# Patient Record
Sex: FEMALE | Race: WHITE | Employment: OTHER | URBAN - METROPOLITAN AREA
[De-identification: names, ages, dates, MRNs, and addresses within clinical notes are randomized per-mention and may not be internally consistent; named-entity substitution may affect disease eponyms.]

---

## 2017-01-05 ENCOUNTER — TELEPHONE (OUTPATIENT)
Dept: FAMILY MEDICINE | Facility: CLINIC | Age: 72
End: 2017-01-05

## 2017-01-05 NOTE — TELEPHONE ENCOUNTER
Panel Management Review      Patient has the following on her problem list:     Diabetes    ASA: Not Required     Last A1C  A1C      8.8   11/16/2016  A1C      7.3   5/17/2016  A1C      7.0   10/2/2015  A1C      7.1   1/15/2015  A1C      6.8   4/25/2014  A1C tested: Failed    Last LDL:    CHOL      198   11/16/2016  HDL       45   11/16/2016  LDL      129   11/16/2016  TRIG      122   11/16/2016  CHOLHDLRATIO      4.9   6/2/2014  NHDL      153   11/16/2016    Is the patient on a Statin? YES             Is the patient on Aspirin? NO    Medications     HMG CoA Reductase Inhibitors    atorvastatin (LIPITOR) 40 MG tablet          Last three blood pressure readings:  BP Readings from Last 3 Encounters:   11/22/16 140/80   09/08/16 142/84   05/17/16 130/80       Date of last diabetes office visit:      Tobacco History:     History   Smoking status     Never Smoker    Smokeless tobacco     Never Used           Hypertension   Last three blood pressure readings:  BP Readings from Last 3 Encounters:   11/22/16 140/80   09/08/16 142/84   05/17/16 130/80     Blood pressure: Failed    HTN Guidelines:  Age 18-59 BP range:  Less than 140/90  Age 60-85 with Diabetes:  Less than 140/90  Age 60-85 without Diabetes:  less than 150/90      Composite cancer screening  Chart review shows that this patient is due/due soon for the following None  Summary:    Patient is due/failing the following:   A1C/ b/p    Action needed:   None/ she will f/u in Feb 2017    Type of outreach:    none    Questions for provider review:    None                                                                                                                                    Farida Morley CMA     Chart routed to none .

## 2017-01-10 ENCOUNTER — TELEPHONE (OUTPATIENT)
Dept: FAMILY MEDICINE | Facility: CLINIC | Age: 72
End: 2017-01-10

## 2017-01-10 NOTE — TELEPHONE ENCOUNTER
Panel Management Review      Patient has the following on her problem list:     Diabetes    ASA: Not Required     Last A1C  A1C      8.8   11/16/2016  A1C      7.3   5/17/2016  A1C      7.0   10/2/2015  A1C      7.1   1/15/2015  A1C      6.8   4/25/2014  A1C tested: Failed    Last LDL:    CHOL      198   11/16/2016  HDL       45   11/16/2016  LDL      129   11/16/2016  TRIG      122   11/16/2016  CHOLHDLRATIO      4.9   6/2/2014  NHDL      153   11/16/2016    Is the patient on a Statin? YES             Is the patient on Aspirin? NO    Medications     HMG CoA Reductase Inhibitors    atorvastatin (LIPITOR) 40 MG tablet          Last three blood pressure readings:  BP Readings from Last 3 Encounters:   11/22/16 140/80   09/08/16 142/84   05/17/16 130/80       Date of last diabetes office visit: 11.2016     Tobacco History:     History   Smoking status     Never Smoker    Smokeless tobacco     Never Used             Composite cancer screening  Chart review shows that this patient is due/due soon for the following None  Summary:    Patient is due/failing the following:   A1C    Action needed:   None/ follow up in feb 2017    Type of outreach:    none    Questions for provider review:    None                                                                                                                                    Farida Morley CMA     Chart routed to none .

## 2017-02-03 DIAGNOSIS — I50.40 CHF (CONGESTIVE HEART FAILURE), NYHA CLASS I, UNSPECIFIED FAILURE CHRONICITY, COMBINED (H): ICD-10-CM

## 2017-02-03 DIAGNOSIS — I10 ESSENTIAL HYPERTENSION WITH GOAL BLOOD PRESSURE LESS THAN 140/90: Primary | ICD-10-CM

## 2017-02-03 RX ORDER — FUROSEMIDE 20 MG
40 TABLET ORAL DAILY
Qty: 180 TABLET | Refills: 0 | Status: SHIPPED | OUTPATIENT
Start: 2017-02-03 | End: 2017-06-05

## 2017-02-03 NOTE — TELEPHONE ENCOUNTER
Furosemide 20mg      Last Written Prescription Date: 11/22/16  Last Fill Quantity: 180, # refills: 1  Last Office Visit with G, P or [facility] prescribing provider: 11/22/16       POTASSIUM   Date Value Ref Range Status   11/16/2016 4.7 3.4 - 5.3 mmol/L Final     CREATININE   Date Value Ref Range Status   11/16/2016 1.20* 0.52 - 1.04 mg/dL Final     BP Readings from Last 3 Encounters:   11/22/16 140/80   09/08/16 142/84   05/17/16 130/80     Thank You,  Denisha Mark, [facility]

## 2017-02-09 DIAGNOSIS — Z53.9 DIAGNOSIS NOT YET DEFINED: Primary | ICD-10-CM

## 2017-03-10 DIAGNOSIS — Z53.9 DIAGNOSIS NOT YET DEFINED: Primary | ICD-10-CM

## 2017-03-10 PROCEDURE — G0180 MD CERTIFICATION HHA PATIENT: HCPCS | Performed by: FAMILY MEDICINE

## 2017-03-16 DIAGNOSIS — F41.1 GAD (GENERALIZED ANXIETY DISORDER): ICD-10-CM

## 2017-03-16 RX ORDER — PAROXETINE 20 MG/1
20 TABLET, FILM COATED ORAL AT BEDTIME
Qty: 90 TABLET | Refills: 1 | Status: CANCELLED | OUTPATIENT
Start: 2017-03-16

## 2017-03-16 NOTE — TELEPHONE ENCOUNTER
Paxil 20mg     Last Written Prescription Date: 11/22/16  Last Fill Quantity: 90, # refills: 1  Last Office Visit with [facility] primary care provider:  11/22/16        Last PHQ-9 score on record=   PHQ-9 SCORE 11/22/2016   Total Score -   Total Score 5       Luba Roman CPhT  [facility]    On behalf of [facility]

## 2017-03-17 NOTE — TELEPHONE ENCOUNTER
Last script was sent to [facility] site contact pharmacy to transfer Rx.    Roberto Dueñas PharmD - [facility], on behalf of [facility]

## 2017-03-21 DIAGNOSIS — E11.9 TYPE 2 DIABETES, HBA1C GOAL < 8% (H): ICD-10-CM

## 2017-03-21 DIAGNOSIS — E11.22 TYPE 2 DIABETES MELLITUS WITH DIABETIC CHRONIC KIDNEY DISEASE (H): ICD-10-CM

## 2017-03-21 DIAGNOSIS — E11.22 TYPE 2 DIABETES MELLITUS WITH CHRONIC KIDNEY DISEASE ON CHRONIC DIALYSIS, UNSPECIFIED LONG TERM INSULIN USE STATUS: Primary | ICD-10-CM

## 2017-03-21 DIAGNOSIS — Z99.2 TYPE 2 DIABETES MELLITUS WITH CHRONIC KIDNEY DISEASE ON CHRONIC DIALYSIS, UNSPECIFIED LONG TERM INSULIN USE STATUS: Primary | ICD-10-CM

## 2017-03-21 DIAGNOSIS — N18.6 TYPE 2 DIABETES MELLITUS WITH CHRONIC KIDNEY DISEASE ON CHRONIC DIALYSIS, UNSPECIFIED LONG TERM INSULIN USE STATUS: Primary | ICD-10-CM

## 2017-03-21 DIAGNOSIS — I10 HYPERTENSION GOAL BP (BLOOD PRESSURE) < 140/90: ICD-10-CM

## 2017-03-21 NOTE — TELEPHONE ENCOUNTER
Patient was due back for diabetes OV in Feb.  No appointment pending at this time.  Routing to provider to advise.    Shelby Torres RN

## 2017-03-21 NOTE — LETTER
[facility]                                           [address] [place]  [address]    March 23, 2017    Catherine Ferreira  [address] [facility] [address]    Dear Catherine,       We recently received a refill request for blood glucose monitoring (NO BRAND SPECIFIED) test strip.  We have refilled this for a one time 30 day supply only because you are due for a:    Diabetes office visit and fasting lab appointment-Dr Dotson would like to see you in the next month please      Please schedule this lab appointment 4-5 days prior to the office visit.     Please call at your earliest convenience so that there will not be a delay with your future refills.          Thank you,   Your [facility] Care Team/  [phone]

## 2017-03-23 NOTE — TELEPHONE ENCOUNTER
Please review lab orders sign and close encounter. Tamia Butt MA/RAYSHAWN    Mailed letter that lab and provider appt are due

## 2017-04-04 DIAGNOSIS — Z53.9 DIAGNOSIS NOT YET DEFINED: Primary | ICD-10-CM

## 2017-04-11 ENCOUNTER — MEDICAL CORRESPONDENCE (OUTPATIENT)
Dept: HEALTH INFORMATION MANAGEMENT | Facility: CLINIC | Age: 72
End: 2017-04-11

## 2017-04-14 DIAGNOSIS — Z53.9 DIAGNOSIS NOT YET DEFINED: Primary | ICD-10-CM

## 2017-04-28 ENCOUNTER — TELEPHONE (OUTPATIENT)
Dept: FAMILY MEDICINE | Facility: CLINIC | Age: 72
End: 2017-04-28

## 2017-04-28 DIAGNOSIS — E11.22 TYPE 2 DIABETES MELLITUS WITH STAGE 1 CHRONIC KIDNEY DISEASE, WITHOUT LONG-TERM CURRENT USE OF INSULIN (H): ICD-10-CM

## 2017-04-28 DIAGNOSIS — N18.1 TYPE 2 DIABETES MELLITUS WITH STAGE 1 CHRONIC KIDNEY DISEASE, WITHOUT LONG-TERM CURRENT USE OF INSULIN (H): ICD-10-CM

## 2017-04-28 DIAGNOSIS — J45.40 MODERATE PERSISTENT ASTHMA: ICD-10-CM

## 2017-04-28 RX ORDER — METFORMIN HCL 500 MG
TABLET, EXTENDED RELEASE 24 HR ORAL
Qty: 180 TABLET | Refills: 0 | Status: CANCELLED | OUTPATIENT
Start: 2017-04-28

## 2017-04-28 RX ORDER — METFORMIN HCL 500 MG
500 TABLET, EXTENDED RELEASE 24 HR ORAL 2 TIMES DAILY WITH MEALS
Qty: 180 TABLET | Refills: 0 | Status: SHIPPED | OUTPATIENT
Start: 2017-04-28 | End: 2017-08-01

## 2017-04-28 RX ORDER — ALBUTEROL SULFATE 90 UG/1
AEROSOL, METERED RESPIRATORY (INHALATION)
Qty: 18 G | Refills: 0 | Status: SHIPPED | OUTPATIENT
Start: 2017-04-28 | End: 2017-06-05

## 2017-04-28 NOTE — TELEPHONE ENCOUNTER
Patient would like refill RX: metFORMIN (GLUCOPHAGE-XR) 500 MG 24 hr tablet, until she can be seen with provider. Thank you.

## 2017-05-23 ENCOUNTER — TELEPHONE (OUTPATIENT)
Dept: FAMILY MEDICINE | Facility: CLINIC | Age: 72
End: 2017-05-23

## 2017-05-23 NOTE — LETTER
[facility]  [address] [facility] [address] [phone]          May 23, 2017    Catherine Ferreira  [address] [facility] [address]              Our records indicate that you have not scheduled for a(n)appointment with David Dotson MD, Diabetic check  and Fasting bloodwork which was recommended by your health care team. Monitoring and managing your preventative and chronic health conditions are very important to us.       If you have received your health care elsewhere, please provide us with that information so it can be documented in your chart.    Please call [phone] or message us through your [employer] account to schedule an appointment or provide information for your chart.     I look forward to seeing you and working with you on your health care needs.     Sincerely,   David Dotson MD          *If you have already scheduled an appointment, please disregard this reminder

## 2017-05-23 NOTE — TELEPHONE ENCOUNTER
Panel Management Review      Patient has the following on her problem list:     Diabetes    ASA: Not Required     Last A1C  Lab Results   Component Value Date    A1C 8.8 11/16/2016    A1C 7.3 05/17/2016    A1C 7.0 10/02/2015    A1C 7.1 01/15/2015    A1C 6.8 04/25/2014     A1C tested: FAILED    Last LDL:    Lab Results   Component Value Date    CHOL 198 11/16/2016     Lab Results   Component Value Date    HDL 45 11/16/2016     Lab Results   Component Value Date     11/16/2016     Lab Results   Component Value Date    TRIG 122 11/16/2016     Lab Results   Component Value Date    CHOLHDLRATIO 4.9 06/02/2014     Lab Results   Component Value Date    NHDL 153 11/16/2016       Is the patient on a Statin? YES             Is the patient on Aspirin? NO    Medications     HMG CoA Reductase Inhibitors    atorvastatin (LIPITOR) 40 MG tablet          Last three blood pressure readings:  BP Readings from Last 3 Encounters:   11/22/16 140/80   09/08/16 142/84   05/17/16 130/80       Date of last diabetes office visit: 11.2016     Tobacco History:     History   Smoking Status     Never Smoker   Smokeless Tobacco     Never Used         Hypertension   Last three blood pressure readings:  BP Readings from Last 3 Encounters:   11/22/16 140/80   09/08/16 142/84   05/17/16 130/80     Blood pressure: FAILED    HTN Guidelines:  Age 18-59 BP range:  Less than 140/90  Age 60-85 with Diabetes:  Less than 140/90  Age 60-85 without Diabetes:  less than 150/90      Composite cancer screening  Chart review shows that this patient is due/due soon for the following None  Summary:    Patient is due/failing the following:   A1C,cbc, bmp,alt,micro albumin     Action needed:   Patient needs office visit for diab check.    Type of outreach:    Sent letter.    Questions for provider review:    Please place fasting lab order                                                                                                                                     Farida Morley, [facility]       Chart routed to Provider .

## 2017-06-02 ENCOUNTER — TELEPHONE (OUTPATIENT)
Dept: FAMILY MEDICINE | Facility: CLINIC | Age: 72
End: 2017-06-02

## 2017-06-02 NOTE — TELEPHONE ENCOUNTER
Pt states she is having fatigue and chest congestion.  Appointment changed to Monday.  Farida Silva RN

## 2017-06-02 NOTE — TELEPHONE ENCOUNTER
Reason for Call:  Other appointment    Detailed comments: pt scheduled an appt for diabetes follow up on June 23rd with Mauri. Pt states isnt feeling well and would like to be seen for follow up sooner than scheduled date. Please advise and contact pt if can be seen sooner than appt on June 23rd.    Phone Number Patient can be reached at: Home number on file [phone] (home)    Best Time: ANY    Can we leave a detailed message on this number? YES    Call taken on 6/2/2017 at 12:57 PM by Genoveva Ramirez

## 2017-06-05 ENCOUNTER — TELEPHONE (OUTPATIENT)
Dept: FAMILY MEDICINE | Facility: CLINIC | Age: 72
End: 2017-06-05

## 2017-06-05 ENCOUNTER — OFFICE VISIT (OUTPATIENT)
Dept: FAMILY MEDICINE | Facility: CLINIC | Age: 72
End: 2017-06-05
Payer: COMMERCIAL

## 2017-06-05 VITALS
OXYGEN SATURATION: 92 % | HEART RATE: 93 BPM | TEMPERATURE: 98.8 F | WEIGHT: 293 LBS | BODY MASS INDEX: 54.59 KG/M2 | DIASTOLIC BLOOD PRESSURE: 82 MMHG | SYSTOLIC BLOOD PRESSURE: 154 MMHG

## 2017-06-05 DIAGNOSIS — F41.1 GAD (GENERALIZED ANXIETY DISORDER): ICD-10-CM

## 2017-06-05 DIAGNOSIS — F33.1 MAJOR DEPRESSIVE DISORDER, RECURRENT EPISODE, MODERATE (H): ICD-10-CM

## 2017-06-05 DIAGNOSIS — I50.40 CHF (CONGESTIVE HEART FAILURE), NYHA CLASS I, UNSPECIFIED FAILURE CHRONICITY, COMBINED (H): ICD-10-CM

## 2017-06-05 DIAGNOSIS — K21.9 GASTROESOPHAGEAL REFLUX DISEASE WITHOUT ESOPHAGITIS: ICD-10-CM

## 2017-06-05 DIAGNOSIS — K21.00 GASTROESOPHAGEAL REFLUX DISEASE WITH ESOPHAGITIS: ICD-10-CM

## 2017-06-05 DIAGNOSIS — M54.31 SCIATICA OF RIGHT SIDE: ICD-10-CM

## 2017-06-05 DIAGNOSIS — E11.22 TYPE 2 DIABETES MELLITUS WITH CHRONIC KIDNEY DISEASE ON CHRONIC DIALYSIS, UNSPECIFIED LONG TERM INSULIN USE STATUS: Primary | ICD-10-CM

## 2017-06-05 DIAGNOSIS — I10 ESSENTIAL HYPERTENSION WITH GOAL BLOOD PRESSURE LESS THAN 140/90: ICD-10-CM

## 2017-06-05 DIAGNOSIS — J31.0 CHRONIC RHINITIS: ICD-10-CM

## 2017-06-05 DIAGNOSIS — Z99.2 TYPE 2 DIABETES MELLITUS WITH CHRONIC KIDNEY DISEASE ON CHRONIC DIALYSIS, UNSPECIFIED LONG TERM INSULIN USE STATUS: Primary | ICD-10-CM

## 2017-06-05 DIAGNOSIS — E78.5 HYPERLIPIDEMIA WITH TARGET LDL LESS THAN 100: ICD-10-CM

## 2017-06-05 DIAGNOSIS — N18.6 TYPE 2 DIABETES MELLITUS WITH CHRONIC KIDNEY DISEASE ON CHRONIC DIALYSIS, UNSPECIFIED LONG TERM INSULIN USE STATUS: Primary | ICD-10-CM

## 2017-06-05 DIAGNOSIS — J45.40 MODERATE PERSISTENT ASTHMA WITHOUT COMPLICATION: ICD-10-CM

## 2017-06-05 LAB
ALBUMIN SERPL-MCNC: 3.1 G/DL (ref 3.4–5)
ANION GAP SERPL CALCULATED.3IONS-SCNC: 9 MMOL/L (ref 3–14)
BUN SERPL-MCNC: 18 MG/DL (ref 7–30)
CALCIUM SERPL-MCNC: 8.9 MG/DL (ref 8.5–10.1)
CHLORIDE SERPL-SCNC: 104 MMOL/L (ref 94–109)
CO2 SERPL-SCNC: 31 MMOL/L (ref 20–32)
CREAT SERPL-MCNC: 0.95 MG/DL (ref 0.52–1.04)
GFR SERPL CREATININE-BSD FRML MDRD: 58 ML/MIN/1.7M2
GLUCOSE SERPL-MCNC: 172 MG/DL (ref 70–99)
HBA1C MFR BLD: 9.2 % (ref 4.3–6)
PHOSPHATE SERPL-MCNC: 2.3 MG/DL (ref 2.5–4.5)
POTASSIUM SERPL-SCNC: 3.5 MMOL/L (ref 3.4–5.3)
SODIUM SERPL-SCNC: 144 MMOL/L (ref 133–144)

## 2017-06-05 PROCEDURE — 36415 COLL VENOUS BLD VENIPUNCTURE: CPT | Performed by: FAMILY MEDICINE

## 2017-06-05 PROCEDURE — 99214 OFFICE O/P EST MOD 30 MIN: CPT | Performed by: FAMILY MEDICINE

## 2017-06-05 PROCEDURE — 80069 RENAL FUNCTION PANEL: CPT | Performed by: FAMILY MEDICINE

## 2017-06-05 PROCEDURE — 83036 HEMOGLOBIN GLYCOSYLATED A1C: CPT | Performed by: FAMILY MEDICINE

## 2017-06-05 RX ORDER — MONTELUKAST SODIUM 10 MG/1
1 TABLET ORAL DAILY
Qty: 90 TABLET | Refills: 1 | Status: SHIPPED | OUTPATIENT
Start: 2017-06-05 | End: 2018-03-01

## 2017-06-05 RX ORDER — PAROXETINE 20 MG/1
20 TABLET, FILM COATED ORAL AT BEDTIME
Qty: 90 TABLET | Refills: 1 | Status: SHIPPED | OUTPATIENT
Start: 2017-06-05 | End: 2018-03-01

## 2017-06-05 RX ORDER — FUROSEMIDE 20 MG
40 TABLET ORAL DAILY
Qty: 180 TABLET | Refills: 0 | Status: SHIPPED | OUTPATIENT
Start: 2017-06-05 | End: 2017-09-19

## 2017-06-05 RX ORDER — AMLODIPINE BESYLATE 5 MG/1
5 TABLET ORAL DAILY
Qty: 90 TABLET | Refills: 1 | Status: SHIPPED | OUTPATIENT
Start: 2017-06-05 | End: 2018-03-01

## 2017-06-05 RX ORDER — GLIPIZIDE 5 MG/1
5 TABLET, FILM COATED, EXTENDED RELEASE ORAL DAILY
Qty: 90 TABLET | Refills: 0 | Status: SHIPPED | OUTPATIENT
Start: 2017-06-05 | End: 2017-09-19

## 2017-06-05 RX ORDER — ALBUTEROL SULFATE 90 UG/1
AEROSOL, METERED RESPIRATORY (INHALATION)
Qty: 3 INHALER | Refills: 2 | Status: SHIPPED | OUTPATIENT
Start: 2017-06-05 | End: 2018-09-25

## 2017-06-05 RX ORDER — BUPROPION HYDROCHLORIDE 150 MG/1
150 TABLET, EXTENDED RELEASE ORAL
Qty: 90 TABLET | Refills: 1 | Status: SHIPPED | OUTPATIENT
Start: 2017-06-05 | End: 2018-03-01

## 2017-06-05 RX ORDER — ATORVASTATIN CALCIUM 40 MG/1
40 TABLET, FILM COATED ORAL DAILY
Qty: 90 TABLET | Refills: 1 | Status: SHIPPED | OUTPATIENT
Start: 2017-06-05 | End: 2018-08-09

## 2017-06-05 RX ORDER — LISINOPRIL 20 MG/1
20 TABLET ORAL 2 TIMES DAILY
Qty: 180 TABLET | Refills: 1 | Status: SHIPPED | OUTPATIENT
Start: 2017-06-05 | End: 2018-03-01

## 2017-06-05 RX ORDER — GABAPENTIN 300 MG/1
300 CAPSULE ORAL AT BEDTIME
Qty: 90 CAPSULE | Refills: 1 | Status: SHIPPED | OUTPATIENT
Start: 2017-06-05 | End: 2018-02-07

## 2017-06-05 RX ORDER — PANTOPRAZOLE SODIUM 40 MG/1
40 TABLET, DELAYED RELEASE ORAL DAILY
Qty: 90 TABLET | Refills: 1 | Status: SHIPPED | OUTPATIENT
Start: 2017-06-05 | End: 2018-03-01

## 2017-06-05 ASSESSMENT — PATIENT HEALTH QUESTIONNAIRE - PHQ9: 5. POOR APPETITE OR OVEREATING: NOT AT ALL

## 2017-06-05 ASSESSMENT — ANXIETY QUESTIONNAIRES
1. FEELING NERVOUS, ANXIOUS, OR ON EDGE: NOT AT ALL
5. BEING SO RESTLESS THAT IT IS HARD TO SIT STILL: NOT AT ALL
GAD7 TOTAL SCORE: 0
3. WORRYING TOO MUCH ABOUT DIFFERENT THINGS: NOT AT ALL
2. NOT BEING ABLE TO STOP OR CONTROL WORRYING: NOT AT ALL
7. FEELING AFRAID AS IF SOMETHING AWFUL MIGHT HAPPEN: NOT AT ALL
6. BECOMING EASILY ANNOYED OR IRRITABLE: NOT AT ALL

## 2017-06-05 NOTE — NURSING NOTE
"Chief Complaint   Patient presents with     Diabetes     Cough       Initial /82  Pulse 93  Temp 98.8  F (37.1  C) (Oral)  Wt (!) 319 lb (144.7 kg)  SpO2 92%  BMI 54.59 kg/m2 Estimated body mass index is 54.59 kg/(m^2) as calculated from the following:    Height as of 3/28/16: 5' 4.1\" (1.628 m).    Weight as of this encounter: 319 lb (144.7 kg).  Medication Reconciliation: complete   Farida Morley CMA      "

## 2017-06-05 NOTE — PROGRESS NOTES
SUBJECTIVE:  Catherine Ferreira, a 71 year old female scheduled an appointment to discuss the following issues:  Follow-up uncontrolled dm (Worse), htn, asthma, gerd, high cholesterol and depression.  Patient taking metformin. Needs to work on diet. Needs more water. Drinking diet pop - 32 oz/day. Some milk drinker. Breathing worse with exercise. Normal stress test last year.   No prednisone in a while.  Outside blood pressure reading ok.   No nausea, vomiting or diarrhea or black or bloody stools. No hematuria or dysuria.   Would like to see new psych/therapist. Daughter -closely involved. 4 grandchilds. No SUICIAL IDEATION OR HOMOCIDAL IDEATION OR WAYNE. No chest pain. No feet changes.   Eye exam past year - no retinal changes.   Medical, social, surgical, and family histories reviewed.    ROS:    OBJECTIVE:  /82  Pulse 93  Temp 98.8  F (37.1  C) (Oral)  Wt (!) 319 lb (144.7 kg)  SpO2 92%  BMI 54.59 kg/m2  EXAM:  GENERAL APPEARANCE: healthy, alert and no distress  EYES: EOMI,  PERRL  NECK: no adenopathy, no asymmetry, masses, or scars and thyroid normal to palpation  RESP: lungs clear to auscultation - no rales, rhonchi or wheezes  CV: regular rates and rhythm, normal S1 S2, no S3 or S4 and no murmur, click or rub -  ABDOMEN:  soft, nontender, no HSM or masses and bowel sounds normal  MS: extremities normal- no gross deformities noted, no evidence of inflammation in joints, FROM in all extremities.  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 Diet/exercise and 5 lbs weight loss. Reveiwed risks and side  effects of medication  Recheck in 3 months-sooner if worse.     (I10) Essential hypertension with goal blood pressure less than 140/90  Plan: Renal panel (Alb, BUN, Ca, Cl, CO2, Creat,         Gluc, Phos, K, Na), furosemide (LASIX) 20 MG         tablet, lisinopril (PRINIVIL/ZESTRIL) 20 MG         tablet, amLODIPine (NORVASC) 5 MG tablet        Continue meds. Continue self-monitor. Outside blood pressure stable. More water.     (I50.40) CHF (congestive heart failure), NYHA class I, unspecified failure chronicity, combined (H)  Comment: stable  Plan: furosemide (LASIX) 20 MG tablet        Continue daily wts. To ER [place] weight gain. Follow-up per cardiology    (E78.5) Hyperlipidemia with target LDL less than 100  Comment: stable in past  Plan: atorvastatin (LIPITOR) 40 MG tablet        Recheck in 6 months        (J45.40) Moderate persistent asthma without complication  Comment: overall stable recently but patient would like to see allergist  Plan: albuterol (VENTOLIN HFA) 108 (90 BASE) MCG/ACT         Inhaler, ALLERGY/ASTHMA ADULT REFERRAL,         fluticasone-salmeterol (ADVAIR DISKUS) 500-50         MCG/DOSE diskus inhaler, montelukast         (SINGULAIR) 10 MG tablet        Continue mdi's. To er if worsening shortness of breath or chest pain.     (K21.9) Gastroesophageal reflux disease without esophagitis  Comment: stable  Plan: pantoprazole (PROTONIX) 40 MG EC tablet        Limit diet pop. egd if worse. Continue mvi.    (F41.1) TEX (generalized anxiety disorder)  Plan: PARoxetine (PAXIL) 20 MG tablet        See below. Limit caffeine    (M54.31) Sciatica of right side  Comment: stable  Plan: gabapentin (NEURONTIN) 300 MG capsule            (F33.1) Major depressive disorder, recurrent episode, moderate (H)  Comment: overall wellbutrin helpful but would like to see therapist and psych provider  Plan: buPROPion (WELLBUTRIN SR) 150 MG 12 hr tablet        Follow-up walter and will forward to our therapist in clinic.  If SUICIAL IDEATION OR HOMOCIDAL IDEATION OR WAYNE TO ER. Good support system with family. Increase exercise.     David Dotson

## 2017-06-05 NOTE — LETTER
[facility]  [address] [facility] [address] [phone]        June 7, 2017    Catherine Ferreira  [address] [facility] [address]            Dear Catherine,    The results of your recent tests were Generally normal results except blood sugars too high. Recheck in 3 months   Kidney tests stable    Below is a copy of the results. If you have any questions or concerns, please call myself or my nurse at [phone].    Sincerely,    David Dotson MD/justin    Results for orders placed or performed in visit on 06/05/17   Hemoglobin A1c   Result Value Ref Range    Hemoglobin A1C 9.2 (H) 4.3 - 6.0 %   Renal panel (Alb, BUN, Ca, Cl, CO2, Creat, Gluc, Phos, K, Na)   Result Value Ref Range    Sodium 144 133 - 144 mmol/L    Potassium 3.5 3.4 - 5.3 mmol/L    Chloride 104 94 - 109 mmol/L    Carbon Dioxide 31 20 - 32 mmol/L    Anion Gap 9 3 - 14 mmol/L    Glucose 172 (H) 70 - 99 mg/dL    Urea Nitrogen 18 7 - 30 mg/dL    Creatinine 0.95 0.52 - 1.04 mg/dL    GFR Estimate 58 (L) >60 mL/min/1.7m2    GFR Estimate If Black 70 >60 mL/min/1.7m2    Calcium 8.9 8.5 - 10.1 mg/dL    Phosphorus 2.3 (L) 2.5 - 4.5 mg/dL    Albumin 3.1 (L) 3.4 - 5.0 g/dL

## 2017-06-05 NOTE — MR AVS SNAPSHOT
After Visit Summary   6/5/2017    Catherine Ferreira    MRN: [id number]           Patient Information     Date Of Birth          1945        Visit Information        Provider Department      6/5/2017 9:15 AM David Dotson MD [facility]        Today's Diagnoses     Type 2 diabetes mellitus with chronic kidney disease on chronic dialysis, unspecified long term insulin use status (H)    -  1    Essential hypertension with goal blood pressure less than 140/90        CHF (congestive heart failure), NYHA class I, unspecified failure chronicity, combined (H)        Hyperlipidemia with target LDL less than 100        Gastroesophageal reflux disease with esophagitis        Moderate persistent asthma without complication        Gastroesophageal reflux disease without esophagitis        TEX (generalized anxiety disorder)        Sciatica of right side        Major depressive disorder, recurrent episode, moderate (H)        Chronic rhinitis           Follow-ups after your visit        Additional Services     ALLERGY/ASTHMA ADULT REFERRAL       Your provider has referred you to: [facility]: [facility]  [phone] [email or web address]    Please be aware that coverage of these services is subject to the terms and limitations of your health insurance plan.  Call member services at your health plan with any benefit or coverage questions.      Please bring the following with you to your appointment:    (1) Any X-Rays, CTs or MRIs which have been performed.  Contact the facility where they were done to arrange for  prior to your scheduled appointment.    (2) List of current medications  (3) This referral request   (4) Any documents/labs given to you for this referral                  Who to contact     If you have questions or need follow up information about today's clinic visit or your schedule please contact [facility] directly at  "[phone].  Normal or non-critical lab and imaging results will be communicated to you by [employer], letter or phone within 4 business days after the clinic has received the results. If you do not hear from us within 7 days, please contact the clinic through [employer] or phone. If you have a critical or abnormal lab result, we will notify you by phone as soon as possible.  Submit refill requests through [employer] or call your pharmacy and they will forward the refill request to us. Please allow 3 business days for your refill to be completed.          Additional Information About Your Visit        [employer] Information     [employer] lets you send messages to your doctor, view your test results, renew your prescriptions, schedule appointments and more. To sign up, go to [email or web address] . Click on \"Log in\" on the left side of the screen, which will take you to the Welcome page. Then click on \"Sign up Now\" on the right side of the page.     You will be asked to enter the access code listed below, as well as some personal information. Please follow the directions to create your username and password.     Your access code is: [email or web address]  Expires: 2017  7:34 AM     Your access code will  in 90 days. If you need help or a new code, please call your [place] clinic or [phone].        Care EveryWhere ID     This is your Care EveryWhere ID. This could be used by other organizations to access your [place] medical records  [id number]        Your Vitals Were     Pulse Temperature Pulse Oximetry BMI (Body Mass Index)          93 98.8  F (37.1  C) (Oral) 92% 54.59 kg/m2         Blood Pressure from Last 3 Encounters:   17 154/82   16 140/80   16 142/84    Weight from Last 3 Encounters:   17 (!) 319 lb (144.7 kg)   16 (!) 326 lb (147.9 kg)   16 (!) 326 lb (147.9 kg)              We Performed the Following     ALLERGY/ASTHMA ADULT REFERRAL     Hemoglobin A1c     Renal panel " (Alb, BUN, Ca, Cl, CO2, Creat, Gluc, Phos, K, Na)          Today's Medication Changes          These changes are accurate as of: 6/5/17 11:16 AM.  If you have any questions, ask your nurse or doctor.               Start taking these medicines.        Dose/Directions    glipiZIDE 5 MG 24 hr tablet   Commonly known as:  glipiZIDE XL   Used for:  Type 2 diabetes mellitus with chronic kidney disease on chronic dialysis, unspecified long term insulin use status (H)        Dose:  5 mg   Take 1 tablet (5 mg) by mouth daily New for diabetes take with breakfast. Follow-up labs/md appointment in 3 month   Quantity:  90 tablet   Refills:  0         These medicines have changed or have updated prescriptions.        Dose/Directions    * albuterol (2.5 MG/3ML) 0.083% neb solution   This may have changed:  Another medication with the same name was changed. Make sure you understand how and when to take each.   Used for:  Moderate persistent asthma with acute exacerbation        Dose:  1 vial   Take 1 vial (2.5 mg) by nebulization every 6 hours as needed for shortness of breath / dyspnea or wheezing   Quantity:  1 Box   Refills:  2       * albuterol 108 (90 BASE) MCG/ACT Inhaler   Commonly known as:  VENTOLIN HFA   This may have changed:  See the new instructions.   Used for:  Moderate persistent asthma without complication        INHALE 2 PUFFS BY MOUTH FOUR TIMES DAILY AS NEEDED FOR WHEEZING   Quantity:  3 Inhaler   Refills:  2       * Notice:  This list has 2 medication(s) that are the same as other medications prescribed for you. Read the directions carefully, and ask your doctor or other care provider to review them with you.         Where to get your medicines      These medications were sent to [facility] [address] [facility] [address] [facility], [address]  [address] [address], [facility] [address], [facility] [address]     Phone:  [phone]     albuterol 108 (90 BASE) MCG/ACT Inhaler    amLODIPine 5 MG tablet    atorvastatin 40 MG  tablet    buPROPion 150 MG 12 hr tablet    fluticasone-salmeterol 500-50 MCG/DOSE diskus inhaler    furosemide 20 MG tablet    gabapentin 300 MG capsule    glipiZIDE 5 MG 24 hr tablet    lisinopril 20 MG tablet    montelukast 10 MG tablet    pantoprazole 40 MG EC tablet    PARoxetine 20 MG tablet                Primary Care Provider Office Phone # Fax #    David Dotson -[phone]       [facility] [address] [facility] [address]        Thank you!     Thank you for choosing [facility]  for your care. Our goal is always to provide you with excellent care. Hearing back from our patients is one way we can continue to improve our services. Please take a few minutes to complete the written survey that you may receive in the mail after your visit with us. Thank you!             Your Updated Medication List - Protect others around you: Learn how to safely use, store and throw away your medicines at www.disposemymeds.org.          This list is accurate as of: 6/5/17 11:16 AM.  Always use your most recent med list.                   Brand Name Dispense Instructions for use    * albuterol (2.5 MG/3ML) 0.083% neb solution     1 Box    Take 1 vial (2.5 mg) by nebulization every 6 hours as needed for shortness of breath / dyspnea or wheezing       * albuterol 108 (90 BASE) MCG/ACT Inhaler    VENTOLIN HFA    3 Inhaler    INHALE 2 PUFFS BY MOUTH FOUR TIMES DAILY AS NEEDED FOR WHEEZING       amLODIPine 5 MG tablet    NORVASC    90 tablet    Take 1 tablet (5 mg) by mouth daily for blood pressure take at bedtime       ASPIRIN NOT PRESCRIBED    INTENTIONAL     by Other route continuous prn. a       atorvastatin 40 MG tablet    LIPITOR    90 tablet    Take 1 tablet (40 mg) by mouth daily At bedtime for cholesterol Pharmacy ok to hold prescription until due       blood glucose calibration solution    no brand specified    1 Bottle    1 drop by Test Solution route. As needed        blood glucose monitoring meter device kit    no brand specified    1 kit    Use to test blood sugar 2 times daily or as directed. Dispense one touch or brand per insurance or pt preference.       * blood glucose monitoring test strip    no brand specified    100 each    Ultra test strips testing once per day       * blood glucose monitoring test strip    ACCU-CHEK BHARGAVI    200 each    Use to test blood sugars 2 times daily or as directed.       * blood glucose monitoring test strip    no brand specified    200 each    Use to test blood sugars 2 times daily dispense one touch or brand per insurance or pt preference.       buPROPion 150 MG 12 hr tablet    WELLBUTRIN SR    90 tablet    Take 1 tablet (150 mg) by mouth daily (before lunch) For depression/energy and weight loss       CYANOCOBALAMIN PO      Take 1,000 mcg by mouth daily       cyclobenzaprine 5 MG tablet    FLEXERIL    42 tablet    Take 1 tablet (5 mg) by mouth 3 times daily as needed for muscle spasms       fluconazole 150 MG tablet    DIFLUCAN    3 tablet    Take 1 pill every 3 days (3 doses) until vaginal symptoms are gone.       fluticasone 50 MCG/ACT spray    FLONASE    16 g    Spray 2 sprays into both nostrils daily as needed for rhinitis or allergies       fluticasone-salmeterol 500-50 MCG/DOSE diskus inhaler    ADVAIR DISKUS    3 Inhaler    Inhale 1 puff into the lungs 2 times daily For asthma control.Pharmacy ok to hold prescription until due       furosemide 20 MG tablet    LASIX    180 tablet    Take 2 tablets (40 mg) by mouth daily With breakfast for blood pressure and for swelling/breathing       gabapentin 300 MG capsule    NEURONTIN    90 capsule    Take 1 capsule (300 mg) by mouth At Bedtime May increase to 2 cap at bedtime if needed Pharmacy ok to hold prescription until due       glipiZIDE 5 MG 24 hr tablet    glipiZIDE XL    90 tablet    Take 1 tablet (5 mg) by mouth daily New for diabetes take with breakfast. Follow-up labs/md  appointment in 3 month       ibuprofen 600 MG tablet    ADVIL/MOTRIN    60 tablet    Take 1 tablet (600 mg) by mouth every 6 hours as needed for moderate pain       ipratropium - albuterol 0.5 mg/2.5 mg/3 mL 0.5-2.5 (3) MG/3ML neb solution    DUONEB    30 vial    Take 1 vial (3 mLs) by nebulization every 6 hours as needed for shortness of breath / dyspnea       lisinopril 20 MG tablet    PRINIVIL/ZESTRIL    180 tablet    Take 1 tablet (20 mg) by mouth 2 times daily For blood pressure Pharmacy ok to hold prescription until due       MELATONIN PO      Take 5 mg by mouth nightly as needed       metFORMIN 500 MG 24 hr tablet    GLUCOPHAGE-XR    180 tablet    Take 1 tablet (500 mg) by mouth 2 times daily (with meals) for diabetes. This is double dosage. Repeat labs in 3 months       METOPROLOL SUCCINATE ER PO      Take 25 mg by mouth daily       montelukast 10 MG tablet    SINGULAIR    90 tablet    Take 1 tablet (10 mg) by mouth daily For asthma/allergies Pharmacy ok to hold prescription until due       Multi-vitamin Tabs tablet      Take 1 tablet by mouth daily       nystatin 444379 UNIT/GM Powd    MYCOSTATIN    1 Bottle    Apply 1 g topically 4 times daily       OMEGA-3 FISH OIL PO      Take 2 g by mouth daily       * ONE TOUCH DELICA LANCETS Misc     1 each    1 Box 2 times daily.       * [employer] FINEPOINT LANCETS Misc     200 each    Use to test blood sugars 2 times daily dispense one touch brand or brand per patient preference.       pantoprazole 40 MG EC tablet    PROTONIX    90 tablet    Take 1 tablet (40 mg) by mouth daily For heartburn. Take 30-60 minutes before a meal.Pharmacy ok to hold prescription until due       PARoxetine 20 MG tablet    PAXIL    90 tablet    Take 1 tablet (20 mg) by mouth At Bedtime For anxiety - this is 1/2 dosage from before       polyethylene glycol powder    MIRALAX    510 g    Take 17 g by mouth daily as needed for constipation       * Notice:  This list has 7 medication(s) that are  the same as other medications prescribed for you. Read the directions carefully, and ask your doctor or other care provider to review them with you.

## 2017-06-06 ASSESSMENT — ANXIETY QUESTIONNAIRES: GAD7 TOTAL SCORE: 0

## 2017-06-06 ASSESSMENT — PATIENT HEALTH QUESTIONNAIRE - PHQ9: SUM OF ALL RESPONSES TO PHQ QUESTIONS 1-9: 24

## 2017-06-07 ENCOUNTER — ALLIED HEALTH/NURSE VISIT (OUTPATIENT)
Dept: PHARMACY | Facility: CLINIC | Age: 72
End: 2017-06-07
Payer: COMMERCIAL

## 2017-06-07 DIAGNOSIS — J30.1 ALLERGIC RHINITIS DUE TO POLLEN, UNSPECIFIED RHINITIS SEASONALITY: ICD-10-CM

## 2017-06-07 DIAGNOSIS — E11.22 TYPE 2 DIABETES MELLITUS WITH CHRONIC KIDNEY DISEASE, WITHOUT LONG-TERM CURRENT USE OF INSULIN, UNSPECIFIED CKD STAGE (H): Primary | ICD-10-CM

## 2017-06-07 DIAGNOSIS — I10 ESSENTIAL HYPERTENSION WITH GOAL BLOOD PRESSURE LESS THAN 140/90: ICD-10-CM

## 2017-06-07 DIAGNOSIS — J45.40 MODERATE PERSISTENT ASTHMA WITHOUT COMPLICATION: ICD-10-CM

## 2017-06-07 DIAGNOSIS — I50.40 CHF (CONGESTIVE HEART FAILURE), NYHA CLASS I, UNSPECIFIED FAILURE CHRONICITY, COMBINED (H): ICD-10-CM

## 2017-06-07 DIAGNOSIS — M79.10 MUSCLE PAIN: ICD-10-CM

## 2017-06-07 PROCEDURE — 99605 MTMS BY PHARM NP 15 MIN: CPT | Performed by: PHARMACIST

## 2017-06-07 PROCEDURE — 99607 MTMS BY PHARM ADDL 15 MIN: CPT | Performed by: PHARMACIST

## 2017-06-07 NOTE — MR AVS SNAPSHOT
After Visit Summary   6/7/2017    Catherine Ferreira    MRN: [id number]           Patient Information     Date Of Birth          1945        Visit Information        Provider Department      6/7/2017 10:00 AM Abby Gutiérrez, [facility] MTM        Today's Diagnoses     Type 2 diabetes mellitus with chronic kidney disease, without long-term current use of insulin, unspecified CKD stage (H)    -  1    CHF (congestive heart failure), NYHA class I, unspecified failure chronicity, combined (H)        Essential hypertension with goal blood pressure less than 140/90        Moderate persistent asthma without complication        Allergic rhinitis due to pollen, unspecified rhinitis seasonality        Muscle pain          Care Instructions    Recommendations from today's MTM visit:                                                    MTM (medication therapy management) is a service provided by a clinical pharmacist designed to help you get the most of out of your medicines.   Today we reviewed what your medicines are for, how to know if they are working, that your medicines are safe and how to make your medicine regimen as easy as possible.     1. We discussed that there may be a way to reduce the number of blood pressure medicines you take -- you will need to talk to Dr. Dotson or your heart doctor about increasing the dose of your metoprolol OR switching you to a medication called carvedilol, which also works to slow down your heart beat and lower blood pressure, and possibly take you off of amlodipine.    2. Please start monitoring your weight every morning. As we discussed, we aren't using your weight as a way to  you, but if you gain more than 2lbs in a day or 5lbs in a week, it is an early sign that you are retaining too much fluid. You should call your doctor right away if this happens and he can adjust the dose of your water pill as a way to prevent fluid from building  up around your lungs and heart.    3. Put your Advair inhaler in the bathroom and start taking 1 puff every morning and 1 puff every evening. As we discussed this medication is a very helpful anti-inflammatory that over the next few weeks will reduce inflammation and swelling in your lungs, and keep your lung tissue healthy and less sensitive to pollens, exercise and cold air. You will likely find that you need your albuterol inhaler and nebs a lot less after being on the Advair for a few weeks. Also make sure to rinse your mouth out after using the inhaler.    Next MTM visit: I will call you on Wednesday, June 28th at 1pm to review the rest of your medications and to see how your blood sugar is looking.    To schedule another MTM appointment, please call the clinic directly or you may call the MTM scheduling line at [phone] or toll-free at [phone].     My Clinical Pharmacist's contact information:                                                      It was a pleasure seeing you today!  Please feel free to contact me with any questions or concerns you have.      Abby Gutiérrez, Pharm.D., [facility]  Medication Therapy Management Pharmacist  [phone]      You may receive a survey about the [facility] services you received.  I would appreciate your feedback to help me serve you better in the future. Please fill it out and return it when you can. Your comments will be anonymous.                    Follow-ups after your visit        Your next 10 appointments already scheduled     Jun 28, 2017  1:00 PM CDT   TELEMEDICINE with Abby Gutiérrez [facility] ([address] [id number] [phone]           Note: this is not an onsite visit; there is no need to come to the facility.              Who to contact     If you have questions or need follow up information about today's clinic visit or your  "schedule please contact [facility] MTM directly at [phone].  Normal or non-critical lab and imaging results will be communicated to you by MyChart, letter or phone within 4 business days after the clinic has received the results. If you do not hear from us within 7 days, please contact the clinic through MyChart or phone. If you have a critical or abnormal lab result, we will notify you by phone as soon as possible.  Submit refill requests through [employer] or call your pharmacy and they will forward the refill request to us. Please allow 3 business days for your refill to be completed.          Additional Information About Your Visit        [employer] Information     [employer] lets you send messages to your doctor, view your test results, renew your prescriptions, schedule appointments and more. To sign up, go to [email or web address] . Click on \"Log in\" on the left side of the screen, which will take you to the Welcome page. Then click on \"Sign up Now\" on the right side of the page.     You will be asked to enter the access code listed below, as well as some personal information. Please follow the directions to create your username and password.     Your access code is: [email or web address]  Expires: 2017  7:34 AM     Your access code will  in 90 days. If you need help or a new code, please call your [place] clinic or [phone].        Care EveryWhere ID     This is your Care EveryWhere ID. This could be used by other organizations to access your [place] medical records  [id number]         Blood Pressure from Last 3 Encounters:   17 154/82   16 140/80   16 142/84    Weight from Last 3 Encounters:   17 (!) 319 lb (144.7 kg)   16 (!) 326 lb (147.9 kg)   16 (!) 326 lb (147.9 kg)              Today, you had the following     No orders found for display       Primary Care Provider Office Phone # Fax #    David Dotson [phone] "       [facility] [address] [facility] [address]        Thank you!     Thank you for choosing [facility]  for your care. Our goal is always to provide you with excellent care. Hearing back from our patients is one way we can continue to improve our services. Please take a few minutes to complete the written survey that you may receive in the mail after your visit with us. Thank you!             Your Updated Medication List - Protect others around you: Learn how to safely use, store and throw away your medicines at www.disposemymeds.org.          This list is accurate as of: 6/7/17 12:00 PM.  Always use your most recent med list.                   Brand Name Dispense Instructions for use    * albuterol (2.5 MG/3ML) 0.083% neb solution     1 Box    Take 1 vial (2.5 mg) by nebulization every 6 hours as needed for shortness of breath / dyspnea or wheezing       * albuterol 108 (90 BASE) MCG/ACT Inhaler    VENTOLIN HFA    3 Inhaler    INHALE 2 PUFFS BY MOUTH FOUR TIMES DAILY AS NEEDED FOR WHEEZING       amLODIPine 5 MG tablet    NORVASC    90 tablet    Take 1 tablet (5 mg) by mouth daily for blood pressure take at bedtime       aspirin 81 MG tablet      Take 1 tablet (81 mg) by mouth daily       atorvastatin 40 MG tablet    LIPITOR    90 tablet    Take 1 tablet (40 mg) by mouth daily At bedtime for cholesterol Pharmacy ok to hold prescription until due       blood glucose monitoring meter device kit    no brand specified    1 kit    Use to test blood sugar 2 times daily or as directed. Dispense one touch or brand per insurance or pt preference.       blood glucose monitoring test strip    no brand specified    200 each    Use to test blood sugars 2 times daily dispense one touch or brand per insurance or pt preference.       buPROPion 150 MG 12 hr tablet    WELLBUTRIN SR    90 tablet    Take 1 tablet (150 mg) by mouth daily (before lunch) For depression/energy and weight loss        CYANOCOBALAMIN PO      Take 1,000 mcg by mouth daily       cyclobenzaprine 5 MG tablet    FLEXERIL    42 tablet    Take 1 tablet (5 mg) by mouth 3 times daily as needed for muscle spasms       fluconazole 150 MG tablet    DIFLUCAN    3 tablet    Take 1 pill every 3 days (3 doses) until vaginal symptoms are gone.       fluticasone 50 MCG/ACT spray    FLONASE    16 g    Spray 2 sprays into both nostrils daily as needed for rhinitis or allergies       fluticasone-salmeterol 500-50 MCG/DOSE diskus inhaler    ADVAIR DISKUS    3 Inhaler    Inhale 1 puff into the lungs 2 times daily For asthma control.Pharmacy ok to hold prescription until due       furosemide 20 MG tablet    LASIX    180 tablet    Take 2 tablets (40 mg) by mouth daily With breakfast for blood pressure and for swelling/breathing       gabapentin 300 MG capsule    NEURONTIN    90 capsule    Take 1 capsule (300 mg) by mouth At Bedtime May increase to 2 cap at bedtime if needed Pharmacy ok to hold prescription until due       glipiZIDE 5 MG 24 hr tablet    glipiZIDE XL    90 tablet    Take 1 tablet (5 mg) by mouth daily New for diabetes take with breakfast. Follow-up labs/md appointment in 3 month       ibuprofen 600 MG tablet    ADVIL/MOTRIN    60 tablet    Take 1 tablet (600 mg) by mouth every 6 hours as needed for moderate pain       ipratropium - albuterol 0.5 mg/2.5 mg/3 mL 0.5-2.5 (3) MG/3ML neb solution    DUONEB    30 vial    Take 1 vial (3 mLs) by nebulization every 6 hours as needed for shortness of breath / dyspnea       [employer] FINEPOINT LANCETS Misc     200 each    Use to test blood sugars 2 times daily dispense one touch brand or brand per patient preference.       lisinopril 20 MG tablet    PRINIVIL/ZESTRIL    180 tablet    Take 1 tablet (20 mg) by mouth 2 times daily For blood pressure Pharmacy ok to hold prescription until due       MELATONIN PO      Take 5 mg by mouth nightly as needed       metFORMIN 500 MG 24 hr tablet     GLUCOPHAGE-XR    180 tablet    Take 1 tablet (500 mg) by mouth 2 times daily (with meals) for diabetes. This is double dosage. Repeat labs in 3 months       METOPROLOL SUCCINATE ER PO      Take 25 mg by mouth daily       montelukast 10 MG tablet    SINGULAIR    90 tablet    Take 1 tablet (10 mg) by mouth daily For asthma/allergies Pharmacy ok to hold prescription until due       Multi-vitamin Tabs tablet      Take 1 tablet by mouth daily       nystatin 615134 UNIT/GM Powd    MYCOSTATIN    1 Bottle    Apply 1 g topically 4 times daily       OMEGA-3 FISH OIL PO      Take 2 g by mouth daily       pantoprazole 40 MG EC tablet    PROTONIX    90 tablet    Take 1 tablet (40 mg) by mouth daily For heartburn. Take 30-60 minutes before a meal.Pharmacy ok to hold prescription until due       PARoxetine 20 MG tablet    PAXIL    90 tablet    Take 1 tablet (20 mg) by mouth At Bedtime For anxiety - this is 1/2 dosage from before       polyethylene glycol powder    MIRALAX    510 g    Take 17 g by mouth daily as needed for constipation       * Notice:  This list has 2 medication(s) that are the same as other medications prescribed for you. Read the directions carefully, and ask your doctor or other care provider to review them with you.

## 2017-06-07 NOTE — PATIENT INSTRUCTIONS
Recommendations from today's MTM visit:                                                    MTM (medication therapy management) is a service provided by a clinical pharmacist designed to help you get the most of out of your medicines.   Today we reviewed what your medicines are for, how to know if they are working, that your medicines are safe and how to make your medicine regimen as easy as possible.     1. We discussed that there may be a way to reduce the number of blood pressure medicines you take -- you will need to talk to Dr. Dotson or your heart doctor about increasing the dose of your metoprolol OR switching you to a medication called carvedilol, which also works to slow down your heart beat and lower blood pressure, and possibly take you off of amlodipine.    2. Please start monitoring your weight every morning. As we discussed, we aren't using your weight as a way to  you, but if you gain more than 2lbs in a day or 5lbs in a week, it is an early sign that you are retaining too much fluid. You should call your doctor right away if this happens and he can adjust the dose of your water pill as a way to prevent fluid from building up around your lungs and heart.    3. Put your Advair inhaler in the bathroom and start taking 1 puff every morning and 1 puff every evening. As we discussed this medication is a very helpful anti-inflammatory that over the next few weeks will reduce inflammation and swelling in your lungs, and keep your lung tissue healthy and less sensitive to pollens, exercise and cold air. You will likely find that you need your albuterol inhaler and nebs a lot less after being on the Advair for a few weeks. Also make sure to rinse your mouth out after using the inhaler.    Next MTM visit: I will call you on Wednesday, June 28th at 1pm to review the rest of your medications and to see how your blood sugar is looking.    To schedule another MTM appointment, please call the clinic directly or  you may call the MTM scheduling line at [phone] or toll-free at [phone].     My Clinical Pharmacist's contact information:                                                      It was a pleasure seeing you today!  Please feel free to contact me with any questions or concerns you have.      Abby Gutiérrez, Pharm.D., [facility]  Medication Therapy Management Pharmacist  [phone]      You may receive a survey about the MT services you received.  I would appreciate your feedback to help me serve you better in the future. Please fill it out and return it when you can. Your comments will be anonymous.

## 2017-06-07 NOTE — PROGRESS NOTES
SUBJECTIVE/OBJECTIVE:                           Catherine Ferreira is a 71 year old female called for an initial visit for Medication Therapy Management.  She was referred to me from [facility].     Chief Complaint: Do I need 3 meds for BP?    Allergies/ADRs: Reviewed in Epic  Tobacco: No tobacco use   Alcohol: none  Caffeine: 2 12oz diet sodas/day (caffeine free)  Activity: none  PMH: Reviewed in Epic    Medication Adherence: issues found; often takes AM meds at 2pm and PM meds at 12-1am. Although today she took her AM meds at 10am. She used to have a med dispenser, but she would press the button at go back to sleep. She usually wakes up between 11am-2pm and goes to bed 4-7am. She got into this sleep habit when she was caring for her  who  of cancer and feels she has always been a night owl. She admits she often falls asleep in chair and misses PM meds about 2x/week.    Diabetes:  Pt currently taking metformin XR 500mg BID, and recently started glipizide XL 5mg QAM (started this morning).   SMBG Ranges (patient reported): 169mg/dL fasting yesterday, 164mg/dL fasting today, 197mg/dL on the phone (finished breakfast about 15 mins ago); reports normal fasting range 160-200mg/dL.  Symptoms of low blood sugar? none.   Symptoms of high blood sugar? vision changes, polydipsia, polyuria, polyphagia, fatigue and dizziness  Eye exam: up to date  Foot exam: up to date  ACEi/ARB: Yes: lisinopril. Microalbumin is < 30 mg/g.   Aspirin: Taking 81mg daily and denies side effects - has had issues with low platelets in the past with higher doses and in combination with NSAIDs. Most recent platelet level was WNL.  Statin: Yes: atorvastatin 40mg QD and denies side effects    HFpEF/HTN: Current medications include metoprolol succinate 25mg daily, lisinopril 20mg twice daily, amlodipine 5mg QD, furosemide 20mg 2 tabs daily.  Patient reports no current medication side effects.     ECHO:  Date 2016, EF 60%  Pt is not  complaining of sx of HF.    Pt is not measuring daily weights. She reports weighing herself is upsetting because she is over 300# and she doesn't like to see the number. She monitors her feet for swelling and her urine output.   Patient does self-monitor BP. Home BP monitoring in range of 120's systolic over 76's diastolic -- since pacemaker. Reports HR 69-90's bpm.     Asthma:  Current asthma medications: Albuterol MDI, Nebs and (Pt reports using albuterol 2-3 times per day), Albuterol+Ipratropium Nebs and (Pt reports using albuterol 2-3 times per week), Fluticasone+Salmeterol (Advair) twice daily (she forgets to take this most days) and Montelukast (Singulair) 10mg once daily.  Asthma triggers include: pollens, exercise or sports and cold air.  Pt reports the following symptoms: none. Reports baseline PEREZ.  ACT Total Scores 6/23/2016 10/4/2016 12/27/2016   ACT TOTAL SCORE - - -   ASTHMA ER VISITS - - -   ASTHMA HOSPITALIZATIONS - - -   ACT TOTAL SCORE (Goal Greater than or Equal to 20) 9 5 12   In the past 12 months, how many times did you visit the emergency room for your asthma without being admitted to the hospital? 0 2 0   In the past 12 months, how many times were you hospitalized overnight because of your asthma? 0 0 2       Allergic rhinitis: Current medications include fluticasone nasal spray 2 spray(s) PRN and Neti Pot PRN. Primary symptoms are nasal congestion. Pt feels that current therapy is effective when she takes it, which is not daily.     Back/Neck Pain: Takes cyclobenzaprine PRN rarely/hardley at all for back pain. She was taking this more often when she was caring for  - transferring. She uses BioFreeze now and this works well to allieviate pain. She also takes IBU maybe once per week.      Current labs include:  BP Readings from Last 3 Encounters:   06/05/17 154/82   11/22/16 140/80   09/08/16 142/84     Wt Readings from Last 3 Encounters:   06/05/17 (!) 319 lb (144.7 kg)   11/22/16 (!)  326 lb (147.9 kg)   09/08/16 (!) 326 lb (147.9 kg)       Today's Vitals: There were no vitals taken for this visit. Phone Visit.    Lab Results   Component Value Date    A1C 9.2 06/05/2017    A1C 8.8 11/16/2016    A1C 7.3 05/17/2016    A1C 7.0 10/02/2015    A1C 7.1 01/15/2015       Lab Results   Component Value Date    CHOL 198 11/16/2016     Lab Results   Component Value Date    TRIG 122 11/16/2016     Lab Results   Component Value Date    HDL 45 11/16/2016     Lab Results   Component Value Date     11/16/2016       Liver Function Studies -   Recent Labs   Lab Test  06/05/17   1012   05/17/16   1417   PROTTOTAL   --    --   6.9   ALBUMIN  3.1*   < >  3.1*   BILITOTAL   --    --   0.5   ALKPHOS   --    --   129   AST   --    --   15   ALT   --    --   23    < > = values in this interval not displayed.       Lab Results   Component Value Date    UCRR 16 05/18/2016    MICROL <5 05/18/2016    UMALCR Unable to calculate due to low value 05/18/2016       Last Basic Metabolic Panel:  Lab Results   Component Value Date     06/05/2017      Lab Results   Component Value Date    POTASSIUM 3.5 06/05/2017     Lab Results   Component Value Date    CHLORIDE 104 06/05/2017     Lab Results   Component Value Date    BUN 18 06/05/2017     Lab Results   Component Value Date    CR 0.95 06/05/2017     GFR Estimate   Date Value Ref Range Status   06/05/2017 58 (L) >60 mL/min/1.7m2 Final     Comment:     Non  GFR Calc   11/16/2016 44 (L) >60 mL/min/1.7m2 Final     Comment:     Non  GFR Calc   05/17/2016 63 >60 mL/min/1.7m2 Final     Comment:     Non  GFR Calc     GFR Estimate If Black   Date Value Ref Range Status   06/05/2017 70 >60 mL/min/1.7m2 Final     Comment:      GFR Calc   11/16/2016 53 (L) >60 mL/min/1.7m2 Final     Comment:      GFR Calc   05/17/2016 76 >60 mL/min/1.7m2 Final     Comment:      GFR Calc     TSH   Date  Value Ref Range Status   05/17/2016 3.35 0.40 - 4.00 mU/L Final   ]    Most Recent Immunizations   Administered Date(s) Administered     Influenza (High Dose) 3 valent vaccine 10/01/2013     Influenza (IIV3) 10/26/2012     Pneumococcal (PCV 13) 11/16/2016     Pneumococcal 23 valent 02/17/2012     TD (ADULT, 7+) 09/02/2004     TDAP Vaccine (Boostrix) 10/26/2012     Zoster vaccine, live 11/16/2016       ASSESSMENT:                             Current medications were reviewed today.     Medication Adherence: needs improvement - see below    Diabetes: Needs Improvement/further monitoring. Patient is not meeting A1c goal of < 8%. Self monitoring of blood glucose is not at goal of fasting  mg/dL. She just started glipizide this morning. There is room to increase both glipizide and metformin (depending on kidney function) doses if needed in the future.    HFpEF/HTN: Needs Improvement. Patient is meeting BP goal of < 140/90mmHg per home records.  Current weight is not being checked and she would benefit from doing so. In efforts to reduce her daily pill burden, could consider increasing metoprolol dose and D/C amlodipine -- or switch metoprolol to carvedilol which may be more effective at lowering BP.    Asthma: Needs Improvement. Not at goal; ACT < 20. Pt would benefit from starting to take her ICS/LABA as prescribed, she would benefit from further education.    Allergic rhinitis: Stable.      Back/Neck Pain: Stable.      PLAN:                            1. PCP/Cardiology consider increasing metoprolol (or switch to carvedilol) and D/C amlodipine.  2. Reviewed reasoning and guidelines for daily weight monitoring (Call drs office is gained >2lbs/day or 5lbs/week). Pt agreeable to this.   3. Pt will start taking Advair as prescribed BID. Reviewed appropriate use, benefits, risks and monitoring at length.    I spent 60 minutes with this patient today.  All changes were made via collaborative practice agreement with  David Dotson. A copy of the visit note was provided to the patient's primary care provider.    Will follow up in 3 weeks over the phone.    The patient was mailed a summary of these recommendations as an after visit summary.     Abby Gutiérrez, Kristen.D., [facility]  Medication Therapy Management Pharmacist  [phone]

## 2017-06-12 ENCOUNTER — TELEPHONE (OUTPATIENT)
Dept: FAMILY MEDICINE | Facility: CLINIC | Age: 72
End: 2017-06-12

## 2017-06-12 NOTE — TELEPHONE ENCOUNTER
Patient was in to see Dr. Dotson on 06-05-17 and has the diagnoses of Asthma. Patient is due for an ACT. Please complete. Thank you.

## 2017-06-12 NOTE — LETTER
[facility]  [address] [facility] [id number] [phone]    June 13, 2017      Catherine Ferreira  [address] [facility] [address]      Dear Catherine,     Your clinic record indicates that you are due for an asthma update. We have a survey tool called an ACT (or Asthma Control Test) we use to measure the level of control of your asthma. Please complete the enclosed questionnaire and mail it back to us in the self-addressed stamped envelope.     If you have questions about this letter please contact your provider.     Sincerely,       Your [facility] Team

## 2017-06-14 DIAGNOSIS — B37.31 CANDIDIASIS OF VAGINA: ICD-10-CM

## 2017-06-14 RX ORDER — FLUCONAZOLE 150 MG/1
TABLET ORAL
Qty: 3 TABLET | Refills: 0 | Status: SHIPPED | OUTPATIENT
Start: 2017-06-14 | End: 2019-01-05

## 2017-06-14 NOTE — TELEPHONE ENCOUNTER
Patient called pharmacy looking for rx for Fluconazole.  Says she requested med at appointment on 6/7/17.  Can you refill please.    Thank You  Radha Presley, [facility]  [phone]

## 2017-06-22 ENCOUNTER — TELEPHONE (OUTPATIENT)
Dept: BEHAVIORAL HEALTH | Facility: CLINIC | Age: 72
End: 2017-06-22

## 2017-06-22 DIAGNOSIS — F41.1 GAD (GENERALIZED ANXIETY DISORDER): Primary | ICD-10-CM

## 2017-06-22 DIAGNOSIS — J45.40 MODERATE PERSISTENT ASTHMA WITHOUT COMPLICATION: ICD-10-CM

## 2017-06-26 ENCOUNTER — CARE COORDINATION (OUTPATIENT)
Dept: CARE COORDINATION | Facility: CLINIC | Age: 72
End: 2017-06-26

## 2017-06-26 NOTE — PROGRESS NOTES
Clinic Care Coordination Contact  Warm Hand Off Received    SW CC to call patient in 1-2 weeks to follow-up.     ABEL Mosley  [facility]   [phone]  [email or web address]

## 2017-06-26 NOTE — PROGRESS NOTES
Clinic Care Coordination Contact  Care Team Conversations    Pt was referred by clinic behavior provider for help finding therapist accepting medicare.     SW contacted pt and discussed current behavior health needs. Pt reported she had not been in therapy for a number of years but feels she is needing one now. SW discussed possible resources. Pt agreed she would like to have resources mailed to her and get follow up.    SW contacted [facility] MORENA Peralta to forward for continued follow up. MORENA will mail resource and defer outreach to Kathryn.    Barry Justice, KAELYN  Care Coordination  [facility]  [phone]  [email or web address]

## 2017-06-26 NOTE — LETTER
[facility]  [address] [place] [address]  [phone]     June 26, 2017      Catherine Ferreira  [address] [address]        Dear Catherine,    I am a  Care Coordinator, working with the care team at your clinic including your primary care provider,David Dotson.  It was nice to talk with you on 06/26/17. Hope you are feeling better. I would like to introduce you to [facility] Care Coordination Program. The Care Coordinator is a nurse or  who understands the health care system. The goal of Care Coordination is to help you manage your health and improve access to the [facility] system in the most efficient manner.      We discussed your need of connecting with a therapist and agreed on [facility]. Please contact them for an initial appointment at [phone]. There will be a follow up call from Kathryn ROMAN, who will be your ongoing contact for care coordination services.     Thank you for the opportunity to share some information today.  We at [facility] are focused on providing you with the highest-quality healthcare experience possible and that all starts with you.       Sincerely,       ABEL Bowman  Care Coordination  [facility]  [phone]  [email or web address]

## 2017-06-28 ENCOUNTER — ALLIED HEALTH/NURSE VISIT (OUTPATIENT)
Dept: PHARMACY | Facility: CLINIC | Age: 72
End: 2017-06-28
Payer: COMMERCIAL

## 2017-06-28 DIAGNOSIS — I10 ESSENTIAL HYPERTENSION WITH GOAL BLOOD PRESSURE LESS THAN 140/90: ICD-10-CM

## 2017-06-28 DIAGNOSIS — I50.40 CHF (CONGESTIVE HEART FAILURE), NYHA CLASS I, UNSPECIFIED FAILURE CHRONICITY, COMBINED (H): ICD-10-CM

## 2017-06-28 DIAGNOSIS — J45.40 MODERATE PERSISTENT ASTHMA WITHOUT COMPLICATION: ICD-10-CM

## 2017-06-28 DIAGNOSIS — E11.22 TYPE 2 DIABETES MELLITUS WITH CHRONIC KIDNEY DISEASE, WITHOUT LONG-TERM CURRENT USE OF INSULIN, UNSPECIFIED CKD STAGE (H): Primary | ICD-10-CM

## 2017-06-28 PROCEDURE — 99606 MTMS BY PHARM EST 15 MIN: CPT | Performed by: PHARMACIST

## 2017-06-28 PROCEDURE — 99607 MTMS BY PHARM ADDL 15 MIN: CPT | Performed by: PHARMACIST

## 2017-06-28 NOTE — MR AVS SNAPSHOT
After Visit Summary   6/28/2017    Catherine Ferreira    MRN: [id number]           Patient Information     Date Of Birth          1945        Visit Information        Provider Department      6/28/2017 1:00 PM Abby Gutiérrez, [facility] MTM        Today's Diagnoses     Type 2 diabetes mellitus with chronic kidney disease, without long-term current use of insulin, unspecified CKD stage (H)    -  1    CHF (congestive heart failure), NYHA class I, unspecified failure chronicity, combined (H)        Essential hypertension with goal blood pressure less than 140/90        Moderate persistent asthma without complication          Care Instructions    Recommendations from today's MTM visit:                                                    MTM (medication therapy management) is a service provided by a clinical pharmacist designed to help you get the most of out of your medicines.   Today we reviewed what your medicines are for, how to know if they are working, that your medicines are safe and how to make your medicine regimen as easy as possible.     1. Please have your grandson help you get your scale out of storage, and start to weigh yourself upon waking each day. We have discussed that your daily weight helps us to know if you are retaining too much fluid and whether we need to adjust your water pill to help you get rid of the extra fluid. Call the doctors office if you notice a weight gain of 2 or more lbs in one day, or 5 or more lbs in one week.    2. Keep an eye on your right leg. Call the doctor if you feel like the swelling is getting worse, or you have any pain or redness of your leg. Also call the doctor if you notice shortness of breath at rest, worsening shortness of breath with activity, chest pain, or bloating.    3. Start taking your evening dose of Advair right when you sit down in your chair at night. This way, you won't forget to take it when you get up  and go to bed. This can really help to improve your breathing and further reduce your need for the albuterol inhalers and nebs.    Next MTM visit: I will call you on Wednesday, July 26th at 1pm to follow-up. Please let me know if this is not a good date or time for you.    To schedule another MTM appointment, please call the clinic directly or you may call the MTM scheduling line at [phone] or toll-free at [phone].     My Clinical Pharmacist's contact information:                                                      It was a pleasure seeing you today!  Please feel free to contact me with any questions or concerns you have.      Abby Gutiérrez, Pharm.D., [facility]  Medication Therapy Management Pharmacist  [phone]    You may receive a survey about the [facility] services you received.  I would appreciate your feedback to help me serve you better in the future. Please fill it out and return it when you can. Your comments will be anonymous.                    Follow-ups after your visit        Your next 10 appointments already scheduled     Jul 26, 2017  1:00 PM CDT   TELEMEDICINE with Abby Gutiérrez RPH   [facility]    [address] [place] [id number] [phone]           Note: this is not an onsite visit; there is no need to come to the facility.              Who to contact     If you have questions or need follow up information about today's clinic visit or your schedule please contact [facility] directly at [phone].  Normal or non-critical lab and imaging results will be communicated to you by MyChart, letter or phone within 4 business days after the clinic has received the results. If you do not hear from us within 7 days, please contact the clinic through MyChart or phone. If you have a critical or abnormal lab result, we will notify you by phone as soon as  "possible.  Submit refill requests through [employer] or call your pharmacy and they will forward the refill request to us. Please allow 3 business days for your refill to be completed.          Additional Information About Your Visit        [email or web address] Information     [employer] lets you send messages to your doctor, view your test results, renew your prescriptions, schedule appointments and more. To sign up, go to [email or web address] . Click on \"Log in\" on the left side of the screen, which will take you to the Welcome page. Then click on \"Sign up Now\" on the right side of the page.     You will be asked to enter the access code listed below, as well as some personal information. Please follow the directions to create your username and password.     Your access code is: [email or web address]  Expires: 2017  7:34 AM     Your access code will  in 90 days. If you need help or a new code, please call your [facility] clinic or [phone].        Care EveryWhere ID     This is your Care EveryWhere ID. This could be used by other organizations to access your [facility] medical records  [id number]         Blood Pressure from Last 3 Encounters:   17 154/82   16 140/80   16 142/84    Weight from Last 3 Encounters:   17 (!) 319 lb (144.7 kg)   16 (!) 326 lb (147.9 kg)   16 (!) 326 lb (147.9 kg)              Today, you had the following     No orders found for display       Primary Care Provider Office Phone # Fax #    David Dotson [phone]       [facility] [address] [facility] [address]        Equal Access to Services     NICOLE GRANT : Jeremiah Ugalde, wajaya thomas, raeann morrellalmasolomon giang, mikhail callahan. So [facility] [phone].    ATENCIÓN: Si habla español, tiene a duggan disposición servicios gratuitos de asistencia lingüística. Llame al [phone].    We comply with applicable federal civil rights laws " and [place] laws. We do not discriminate on the basis of race, color, national origin, age, disability sex, sexual orientation or gender identity.            Thank you!     Thank you for choosing [facility]  for your care. Our goal is always to provide you with excellent care. Hearing back from our patients is one way we can continue to improve our services. Please take a few minutes to complete the written survey that you may receive in the mail after your visit with us. Thank you!             Your Updated Medication List - Protect others around you: Learn how to safely use, store and throw away your medicines at www.disposemymeds.org.          This list is accurate as of: 6/28/17  2:25 PM.  Always use your most recent med list.                   Brand Name Dispense Instructions for use Diagnosis    * albuterol (2.5 MG/3ML) 0.083% neb solution     1 Box    Take 1 vial (2.5 mg) by nebulization every 6 hours as needed for shortness of breath / dyspnea or wheezing    Moderate persistent asthma with acute exacerbation       * albuterol 108 (90 BASE) MCG/ACT Inhaler    VENTOLIN HFA    3 Inhaler    INHALE 2 PUFFS BY MOUTH FOUR TIMES DAILY AS NEEDED FOR WHEEZING    Moderate persistent asthma without complication       amLODIPine 5 MG tablet    NORVASC    90 tablet    Take 1 tablet (5 mg) by mouth daily for blood pressure take at bedtime    Essential hypertension with goal blood pressure less than 140/90       aspirin 81 MG tablet      Take 1 tablet (81 mg) by mouth daily        atorvastatin 40 MG tablet    LIPITOR    90 tablet    Take 1 tablet (40 mg) by mouth daily At bedtime for cholesterol Pharmacy ok to hold prescription until due    Hyperlipidemia with target LDL less than 100       blood glucose monitoring meter device kit    no brand specified    1 kit    Use to test blood sugar 2 times daily or as directed. Dispense one touch or brand per insurance or pt preference.    Type 2 diabetes  mellitus with diabetic chronic kidney disease (H), Type 2 diabetes, HbA1C goal < 8% (H)       blood glucose monitoring test strip    no brand specified    200 each    Use to test blood sugars 2 times daily dispense one touch or brand per insurance or pt preference.    Type 2 diabetes mellitus with chronic kidney disease on chronic dialysis, unspecified long term insulin use status (H)       buPROPion 150 MG 12 hr tablet    WELLBUTRIN SR    90 tablet    Take 1 tablet (150 mg) by mouth daily (before lunch) For depression/energy and weight loss    Major depressive disorder, recurrent episode, moderate (H)       CYANOCOBALAMIN PO      Take 1,000 mcg by mouth daily        cyclobenzaprine 5 MG tablet    FLEXERIL    42 tablet    Take 1 tablet (5 mg) by mouth 3 times daily as needed for muscle spasms    Back muscle spasm       fluconazole 150 MG tablet    DIFLUCAN    3 tablet    Take 1 pill every 3 days (3 doses) until vaginal symptoms are gone.    Candidiasis of vagina       fluticasone 50 MCG/ACT spray    FLONASE    16 g    Spray 2 sprays into both nostrils daily as needed for rhinitis or allergies    Chronic rhinitis       fluticasone-salmeterol 500-50 MCG/DOSE diskus inhaler    ADVAIR DISKUS    3 Inhaler    Inhale 1 puff into the lungs 2 times daily For asthma control.Pharmacy ok to hold prescription until due    Moderate persistent asthma without complication       furosemide 20 MG tablet    LASIX    180 tablet    Take 2 tablets (40 mg) by mouth daily With breakfast for blood pressure and for swelling/breathing    Essential hypertension with goal blood pressure less than 140/90, CHF (congestive heart failure), NYHA class I, unspecified failure chronicity, combined (H)       gabapentin 300 MG capsule    NEURONTIN    90 capsule    Take 1 capsule (300 mg) by mouth At Bedtime May increase to 2 cap at bedtime if needed Pharmacy ok to hold prescription until due    Sciatica of right side       glipiZIDE 5 MG 24 hr tablet     glipiZIDE XL    90 tablet    Take 1 tablet (5 mg) by mouth daily New for diabetes take with breakfast. Follow-up labs/md appointment in 3 month    Type 2 diabetes mellitus with chronic kidney disease on chronic dialysis, unspecified long term insulin use status (H)       ibuprofen 600 MG tablet    ADVIL/MOTRIN    60 tablet    Take 1 tablet (600 mg) by mouth every 6 hours as needed for moderate pain    Shingles       ipratropium - albuterol 0.5 mg/2.5 mg/3 mL 0.5-2.5 (3) MG/3ML neb solution    DUONEB    30 vial    Take 1 vial (3 mLs) by nebulization every 6 hours as needed for shortness of breath / dyspnea    SOB (shortness of breath)       [employer] FINEPOINT LANCETS Misc     200 each    Use to test blood sugars 2 times daily dispense one touch brand or brand per patient preference.    Type 2 diabetes mellitus with diabetic chronic kidney disease (H), Type 2 diabetes, HbA1C goal < 8% (H)       lisinopril 20 MG tablet    PRINIVIL/ZESTRIL    180 tablet    Take 1 tablet (20 mg) by mouth 2 times daily For blood pressure Pharmacy ok to hold prescription until due    Essential hypertension with goal blood pressure less than 140/90       MELATONIN PO      Take 5 mg by mouth nightly as needed        metFORMIN 500 MG 24 hr tablet    GLUCOPHAGE-XR    180 tablet    Take 1 tablet (500 mg) by mouth 2 times daily (with meals) for diabetes. This is double dosage. Repeat labs in 3 months    Type 2 diabetes mellitus with stage 1 chronic kidney disease, without long-term current use of insulin (H)       METOPROLOL SUCCINATE ER PO      Take 25 mg by mouth daily        montelukast 10 MG tablet    SINGULAIR    90 tablet    Take 1 tablet (10 mg) by mouth daily For asthma/allergies Pharmacy ok to hold prescription until due    Chronic rhinitis, Moderate persistent asthma without complication       Multi-vitamin Tabs tablet      Take 1 tablet by mouth daily        nystatin 411453 UNIT/GM Powd    MYCOSTATIN    1 Bottle    Apply 1 g topically  4 times daily    Candidiasis of skin       OMEGA-3 FISH OIL PO      Take 2 g by mouth daily        pantoprazole 40 MG EC tablet    PROTONIX    90 tablet    Take 1 tablet (40 mg) by mouth daily For heartburn. Take 30-60 minutes before a meal.Pharmacy ok to hold prescription until due    Gastroesophageal reflux disease without esophagitis       PARoxetine 20 MG tablet    PAXIL    90 tablet    Take 1 tablet (20 mg) by mouth At Bedtime For anxiety - this is 1/2 dosage from before    TEX (generalized anxiety disorder)       polyethylene glycol powder    MIRALAX    510 g    Take 17 g by mouth daily as needed for constipation    Constipation       * Notice:  This list has 2 medication(s) that are the same as other medications prescribed for you. Read the directions carefully, and ask your doctor or other care provider to review them with you.

## 2017-06-28 NOTE — PROGRESS NOTES
SUBJECTIVE/OBJECTIVE:                           Catherine Ferreira is a 71 year old female called for a follow-up visit for Medication Therapy Management.  She was referred to me from [facility].     Chief Complaint: Follow-up from our visit 2017.     Tobacco: No tobacco use   Alcohol: none    Medication Adherence: issues found and discussed below; often takes AM meds at 2pm and PM meds at 12-1am. She uses a pill box to help her remember. Missed a few doses while up at cabin all of last week and 1/2 of the week prior. She usually wakes up between 11am-2pm and goes to bed 4-7am. She got into this sleep habit when she was caring for her  who  of cancer and feels she has always been a night owl. She admits she often falls asleep in chair and misses PM meds about 2x/week.    Diabetes:  Pt currently taking metformin XR 500mg BID, glipizide XL 5mg QAM. She started glipizide at last visit with Dr. Dotson 17.  SMBG Ranges (patient reported): 171mg/dL fasting today; reports FBG have been in the 170's since being at her cabin d/t poor diet. She states that prior to vacation FBG running 140-150mg/dL.  Symptoms of low blood sugar? None reported.  Symptoms of high blood sugar? None reported.  Eye exam: up to date  Foot exam: up to date  ACEi/ARB: Yes: lisinopril. Microalbumin is < 30 mg/g.   Aspirin: Taking 81mg daily and denies side effects - has had issues with low platelets in the past with higher doses and in combination with NSAIDs. Most recent platelet level was WNL.  Statin: Yes: atorvastatin 40mg QD and denies side effects  [place]: [place] and reports she wasn't eating healthy foods - admits she was snacking often and on salty foods, polish sausage.    HFpEF/HTN: Current medications include metoprolol succinate 25mg daily, lisinopril 20mg twice daily, amlodipine 5mg QD, furosemide 20mg 2 tabs daily. Patient reports no current medication side effects.     ECHO:  Date 2016, EF  60%  Pt is complaining of increased swelling in her R leg. She attributes this to an increase in salty foods while on vacation. She denies an increase in SOB at rest, PEREZ, bloating, CP, dizziness, LHness.    Pt is not measuring daily weights, despite agreeing to start this at last visit. She states she needs her grandson to come over and get her scale out of storage. Will ask him to do that today. She monitors her feet for swelling and her urine output.   Patient does self-monitor BP. Home BP monitoring in range of 130's/60's. Reports HR 69-90's bpm.     Asthma:  Notes breathing is improved since being more compliant with Advair. Current asthma medications: Albuterol (Ventolin) MDI (Pt reports using albuterol 2-3 times per week), Albuterol+Ipratropium Nebs and (Pt reports using none recently), Fluticasone+Salmeterol (Advair) twice daily (she has been taking this once a day - falls asleep and forgets her PM dose frequently) and Montelukast (Singulair) 10mg once daily.  Asthma triggers include: pollens, strong odors and fumes, exercise or sports and cold air.  Pt reports the following symptoms: none. Reports baseline PEREZ. She did not have time to complete an ACT with me today.  ACT Total Scores 6/23/2016 10/4/2016 12/27/2016   ACT TOTAL SCORE - - -   ASTHMA ER VISITS - - -   ASTHMA HOSPITALIZATIONS - - -   ACT TOTAL SCORE (Goal Greater than or Equal to 20) 9 5 12   In the past 12 months, how many times did you visit the emergency room for your asthma without being admitted to the hospital? 0 2 0   In the past 12 months, how many times were you hospitalized overnight because of your asthma? 0 0 2     Current labs include:  BP Readings from Last 3 Encounters:   06/05/17 154/82   11/22/16 140/80   09/08/16 142/84     Wt Readings from Last 3 Encounters:   06/05/17 (!) 319 lb (144.7 kg)   11/22/16 (!) 326 lb (147.9 kg)   09/08/16 (!) 326 lb (147.9 kg)     Today's Vitals: There were no vitals taken for this visit. Phone  Visit.    Lab Results   Component Value Date    A1C 9.2 06/05/2017    A1C 8.8 11/16/2016    A1C 7.3 05/17/2016    A1C 7.0 10/02/2015    A1C 7.1 01/15/2015       Lab Results   Component Value Date    CHOL 198 11/16/2016     Lab Results   Component Value Date    TRIG 122 11/16/2016     Lab Results   Component Value Date    HDL 45 11/16/2016     Lab Results   Component Value Date     11/16/2016       Liver Function Studies -   Recent Labs   Lab Test  06/05/17   1012   05/17/16   1417   PROTTOTAL   --    --   6.9   ALBUMIN  3.1*   < >  3.1*   BILITOTAL   --    --   0.5   ALKPHOS   --    --   129   AST   --    --   15   ALT   --    --   23    < > = values in this interval not displayed.       Lab Results   Component Value Date    UCRR 16 05/18/2016    MICROL <5 05/18/2016    UMALCR Unable to calculate due to low value 05/18/2016       Last Basic Metabolic Panel:  Lab Results   Component Value Date     06/05/2017      Lab Results   Component Value Date    POTASSIUM 3.5 06/05/2017     Lab Results   Component Value Date    CHLORIDE 104 06/05/2017     Lab Results   Component Value Date    BUN 18 06/05/2017     Lab Results   Component Value Date    CR 0.95 06/05/2017     GFR Estimate   Date Value Ref Range Status   06/05/2017 58 (L) >60 mL/min/1.7m2 Final     Comment:     Non  GFR Calc   11/16/2016 44 (L) >60 mL/min/1.7m2 Final     Comment:     Non  GFR Calc   05/17/2016 63 >60 mL/min/1.7m2 Final     Comment:     Non  GFR Calc     TSH   Date Value Ref Range Status   05/17/2016 3.35 0.40 - 4.00 mU/L Final     Most Recent Immunizations   Administered Date(s) Administered     Influenza (High Dose) 3 valent vaccine 10/01/2013     Influenza (IIV3) 10/26/2012     Pneumococcal (PCV 13) 11/16/2016     Pneumococcal 23 valent 02/17/2012     TD (ADULT, 7+) 09/02/2004     TDAP Vaccine (Boostrix) 10/26/2012     Zoster vaccine, live 11/16/2016       ASSESSMENT:                              Current medications were reviewed today.     Medication Adherence: needs improvement - see below    Diabetes: Needs Improvement/further monitoring. Patient is not meeting A1c goal of < 8%. Self monitoring of blood glucose is not at goal of fasting  mg/dL, although appeared to be improving prior to her vacation -- will continue to monitor and can increase both glipizide and/or metformin (depending on kidney function) doses if needed in the future.    HFpEF/HTN: Needs Improvement. Patient is meeting BP goal of < 140/90mmHg per home records.  Current weight is not being checked and she would benefit from doing so, particularly with recent increase in swelling.     Asthma: Needs Improvement. Not at goal; ACT < 20. Pt would benefit from starting to take her ICS/LABA as prescribed, she would benefit from further education.      PLAN:                            1. Pt to start monitoring weight. Reviewed reasoning and guidelines for daily weight monitoring (Call drs office if gained >2lbs/day or 5lbs/week). Also reviewed that she should call drs office if worsening symptoms of HF including SOB at rest, PEREZ, increased swelling, bloating, or any type of worsened pain or redness in her R leg.   2. Pt will start taking Advair as prescribed BID. Reviewed appropriate use, benefits, risks and monitoring at length.    I spent 30 minutes with this patient today.  All changes were made via collaborative practice agreement with David Dotson. A copy of the visit note was provided to the patient's primary care provider.    Will follow up in 1 month over the phone.    The patient was mailed a summary of these recommendations as an after visit summary.     Abby Gutiérrez, Pharm.D., [facility]  Medication Therapy Management Pharmacist  [phone]

## 2017-06-28 NOTE — Clinical Note
NORY Alexander has had some increased swelling in her R leg since being at her cabin and eating more salty foods than usual. She is not monitoring her weight, so unclear how much/if any weight gain. She notes no other symptoms, but wanted you to be aware.  Thanks! Abby Gutiérrez, Pharm.D., [facility] Med[facility] Management Pharmacist [phone]

## 2017-06-28 NOTE — PATIENT INSTRUCTIONS
Recommendations from today's MTM visit:                                                    MTM (medication therapy management) is a service provided by a clinical pharmacist designed to help you get the most of out of your medicines.   Today we reviewed what your medicines are for, how to know if they are working, that your medicines are safe and how to make your medicine regimen as easy as possible.     1. Please have your grandson help you get your scale out of storage, and start to weigh yourself upon waking each day. We have discussed that your daily weight helps us to know if you are retaining too much fluid and whether we need to adjust your water pill to help you get rid of the extra fluid. Call the doctors office if you notice a weight gain of 2 or more lbs in one day, or 5 or more lbs in one week.    2. Keep an eye on your right leg. Call the doctor if you feel like the swelling is getting worse, or you have any pain or redness of your leg. Also call the doctor if you notice shortness of breath at rest, worsening shortness of breath with activity, chest pain, or bloating.    3. Start taking your evening dose of Advair right when you sit down in your chair at night. This way, you won't forget to take it when you get up and go to bed. This can really help to improve your breathing and further reduce your need for the albuterol inhalers and nebs.    Next MTM visit: I will call you on Wednesday, July 26th at 1pm to follow-up. Please let me know if this is not a good date or time for you.    To schedule another MTM appointment, please call the clinic directly or you may call the MTM scheduling line at [phone] or toll-free at [phone].     My Clinical Pharmacist's contact information:                                                      It was a pleasure seeing you today!  Please feel free to contact me with any questions or concerns you have.      Abby Gutiérrez, Pharm.D., [facility]  Medication  Therapy Management Pharmacist  [phone]    You may receive a survey about the MTM services you received.  I would appreciate your feedback to help me serve you better in the future. Please fill it out and return it when you can. Your comments will be anonymous.

## 2017-06-30 ENCOUNTER — CARE COORDINATION (OUTPATIENT)
Dept: CASE MANAGEMENT | Facility: CLINIC | Age: 72
End: 2017-06-30

## 2017-06-30 NOTE — PROGRESS NOTES
Clinic Care Coordination Contact  OUTREACH    Referral Information:  Referral Source: Care Team  Reason for Contact: Behavioral Health  Care Conference: No     Universal Utilization:   ED Visits in last year: 0  Hospital visits in last year: 0  Last PCP appointment: 06/05/17  Missed Appointments: 0  Concerns: Proper Utilization  Multiple Providers or Specialists: Yes    Clinical Concerns:  Current Medical Concerns:   Patient Active Problem List   Diagnosis     Moderate persistent asthma     GERD (gastroesophageal reflux disease)     Hyperlipidemia with target LDL less than 100     Exogenous obesity     Hypertension goal BP (blood pressure) < 140/90     Advanced directives, counseling/discussion     Mixed anxiety depressive disorder     Pacemaker     Type 2 diabetes, HbA1C goal < 8% (H)     Tendency toward bleeding easily (H)     Chronic rhinitis     CHF (congestive heart failure), NYHA class I (H)     Health Care Home     Urinary incontinence     Straining to void     Rectocele     Cystocele, midline     Allergic rhinitis     Shingles     Constipation     Abdominal pain, unspecified abdominal location     Type 2 diabetes mellitus with diabetic chronic kidney disease     Other constipation     Gastroesophageal reflux disease without esophagitis     Major depressive disorder, recurrent episode, moderate (H)     CHF (congestive heart failure), NYHA class I, unspecified failure chronicity, combined (H)     Type 2 diabetes mellitus with diabetic chronic kidney disease, unspecified CKD stage     Essential hypertension with goal blood pressure less than 140/90     TEX (generalized anxiety disorder)     Type 2 diabetes mellitus with chronic kidney disease on chronic dialysis, unspecified long term insulin use status (H)     Gastroesophageal reflux disease with esophagitis     Moderate persistent asthma without complication     Clinical Pathway Name:Depression    Medication Management:  Patient understands and is adherent      Functional Status:  Mobility Status: Independent  Equipment Currently Used at Home: none  Transportation: Pt drives     Psychosocial:  Current living arrangement:: I live in a private home  Financial/Insurance: [employer] [facility], No concerns discussed     Resources and Interventions:  Current Resources:  [facility]  Advanced Care Plans/Directives on file:: Yes     Goals:   Goal 1 Statement: I would like to find a therapist.  Goal 1 Progression Percent: 100%  Goal 1 Progression Date: 06/30/17    Patient/Caregiver understanding: Pt was able to schedule an appointment with a therapist at [facility] in August.    Frequency of Care Coordination: No further outreaches will be made at this time unless a new referral is made or a change in the pt's status occurs. Patient was provided with this writer's contact information and encouraged to call with any questions or concerns.    Plan: MORENA CLEMENTS will make no more outreaches at this time. All concerns addressed.    ABEL Mosley  [facility]   [phone]  [email or web address]

## 2017-07-01 ASSESSMENT — ASTHMA QUESTIONNAIRES: ACT_TOTALSCORE: 15

## 2017-08-01 DIAGNOSIS — N18.1 TYPE 2 DIABETES MELLITUS WITH STAGE 1 CHRONIC KIDNEY DISEASE, WITHOUT LONG-TERM CURRENT USE OF INSULIN (H): ICD-10-CM

## 2017-08-01 DIAGNOSIS — E11.22 TYPE 2 DIABETES MELLITUS WITH STAGE 1 CHRONIC KIDNEY DISEASE, WITHOUT LONG-TERM CURRENT USE OF INSULIN (H): ICD-10-CM

## 2017-08-02 RX ORDER — METFORMIN HCL 500 MG
TABLET, EXTENDED RELEASE 24 HR ORAL
Qty: 180 TABLET | Refills: 0 | Status: SHIPPED | OUTPATIENT
Start: 2017-08-02 | End: 2017-12-21

## 2017-09-12 ENCOUNTER — TELEPHONE (OUTPATIENT)
Dept: FAMILY MEDICINE | Facility: CLINIC | Age: 72
End: 2017-09-12

## 2017-09-12 NOTE — TELEPHONE ENCOUNTER
Panel Management Review      Patient has the following on her problem list:     Depression / Dysthymia review  PHQ-9 SCORE 5/17/2016 11/22/2016 6/5/2017   Total Score - - -   Total Score 16 5 24      Patient is due for:  DAP    Asthma review     ACT Total Scores 6/30/2017   ACT TOTAL SCORE -   ASTHMA ER VISITS -   ASTHMA HOSPITALIZATIONS -   ACT TOTAL SCORE (Goal Greater than or Equal to 20) 15   In the past 12 months, how many times did you visit the emergency room for your asthma without being admitted to the hospital? 1   In the past 12 months, how many times were you hospitalized overnight because of your asthma? 0      1. Is Asthma diagnosis on the Problem List? Yes    2. Is Asthma listed on Health Maintenance? Yes    3. Patient is due for:  AAP          Composite cancer screening  Chart review shows that this patient is due/due soon for the following None  Summary:    Patient is due/failing the following:   AAP and DAP    Action needed:   Routed to provider for review.    Type of outreach:    none    Questions for provider review:    Please sign the dap and yuri                                                                                                                                    Farida Morley CMA     Chart routed to Provider .

## 2017-09-12 NOTE — LETTER
Catherine Ferreira  [address] [facility] [address]      September 12, 2017      Dear Catherine,      Our records indicate that you have not scheduled for a(n)diabetes, asthma and depression appointment which was recommended by your health care team. Monitoring and managing your preventative and chronic health conditions are very important to us.       If you have received your health care elsewhere, please provide us with that information so it can be documented in your chart.    Please call [phone] or message us through your [employer] account to schedule an appointment or provide information for your chart.     We look forward to seeing you and working with you on your health care needs.     Sincerely,     David Dotson MD/          *If you have already scheduled an appointment, please disregard this reminder

## 2017-09-27 ENCOUNTER — OFFICE VISIT (OUTPATIENT)
Dept: FAMILY MEDICINE | Facility: CLINIC | Age: 72
End: 2017-09-27
Payer: COMMERCIAL

## 2017-09-27 VITALS
TEMPERATURE: 97 F | HEART RATE: 85 BPM | SYSTOLIC BLOOD PRESSURE: 142 MMHG | OXYGEN SATURATION: 94 % | DIASTOLIC BLOOD PRESSURE: 79 MMHG

## 2017-09-27 DIAGNOSIS — E11.22 TYPE 2 DIABETES MELLITUS WITH CHRONIC KIDNEY DISEASE, WITHOUT LONG-TERM CURRENT USE OF INSULIN, UNSPECIFIED CKD STAGE (H): ICD-10-CM

## 2017-09-27 DIAGNOSIS — R42 DIZZINESS: Primary | ICD-10-CM

## 2017-09-27 DIAGNOSIS — R94.31 ABNORMAL FINDING ON EKG: ICD-10-CM

## 2017-09-27 DIAGNOSIS — M79.662 PAIN OF LEFT LOWER LEG: ICD-10-CM

## 2017-09-27 DIAGNOSIS — Z95.0 PACEMAKER: ICD-10-CM

## 2017-09-27 DIAGNOSIS — E66.01 MORBID OBESITY, UNSPECIFIED OBESITY TYPE (H): ICD-10-CM

## 2017-09-27 DIAGNOSIS — I50.40 CHF (CONGESTIVE HEART FAILURE), NYHA CLASS I, UNSPECIFIED FAILURE CHRONICITY, COMBINED (H): ICD-10-CM

## 2017-09-27 LAB — GLUCOSE BLD-MCNC: 128 MG/DL (ref 70–99)

## 2017-09-27 PROCEDURE — 93000 ELECTROCARDIOGRAM COMPLETE: CPT | Performed by: PHYSICIAN ASSISTANT

## 2017-09-27 PROCEDURE — 99215 OFFICE O/P EST HI 40 MIN: CPT | Mod: 25 | Performed by: PHYSICIAN ASSISTANT

## 2017-09-27 PROCEDURE — 36415 COLL VENOUS BLD VENIPUNCTURE: CPT | Performed by: PHYSICIAN ASSISTANT

## 2017-09-27 PROCEDURE — 82947 ASSAY GLUCOSE BLOOD QUANT: CPT | Performed by: PHYSICIAN ASSISTANT

## 2017-09-27 ASSESSMENT — ENCOUNTER SYMPTOMS
HEMOPTYSIS: 0
TREMORS: 0
SPEECH CHANGE: 0
DIAPHORESIS: 0
COUGH: 0
FOCAL WEAKNESS: 0
GASTROINTESTINAL NEGATIVE: 1
DIZZINESS: 1
CONSTITUTIONAL NEGATIVE: 1
SENSORY CHANGE: 0
PALPITATIONS: 0
LOSS OF CONSCIOUSNESS: 0
HEADACHES: 0
EYE PAIN: 0
FEVER: 0
WEIGHT LOSS: 0
TINGLING: 0
RESPIRATORY NEGATIVE: 1

## 2017-09-27 NOTE — MR AVS SNAPSHOT
"              After Visit Summary   9/27/2017    Catherine Ferreira    MRN: [id number]           Patient Information     Date Of Birth          1945        Visit Information        Provider Department      9/27/2017 3:00 PM Shannen Pepper PA-C [facility]        Today's Diagnoses     Dizziness    -  1    Pain of left lower leg        Abnormal finding on EKG        Morbid obesity, unspecified obesity type (H)        CHF (congestive heart failure), NYHA class I, unspecified failure chronicity, combined (H)        Pacemaker        Type 2 diabetes mellitus with chronic kidney disease, without long-term current use of insulin, unspecified CKD stage (H)           Follow-ups after your visit        Follow-up notes from your care team     Return if symptoms worsen or fail to improve.      Who to contact     If you have questions or need follow up information about today's clinic visit or your schedule please contact [facility] directly at [phone].  Normal or non-critical lab and imaging results will be communicated to you by MyChart, letter or phone within 4 business days after the clinic has received the results. If you do not hear from us within 7 days, please contact the clinic through MyChart or phone. If you have a critical or abnormal lab result, we will notify you by phone as soon as possible.  Submit refill requests through [employer] or call your pharmacy and they will forward the refill request to us. Please allow 3 business days for your refill to be completed.          Additional Information About Your Visit        [employer] Information     [employer] lets you send messages to your doctor, view your test results, renew your prescriptions, schedule appointments and more. To sign up, go to [email or web address] . Click on \"Log in\" on the left side of the screen, which will take you to the Welcome page. Then click on \"Sign up Now\" on the right side of the page.     You will be asked to " enter the access code listed below, as well as some personal information. Please follow the directions to create your username and password.     Your access code is: [email or web address]  Expires: 2017  4:42 PM     Your access code will  in 90 days. If you need help or a new code, please call your [facility] clinic or [phone].        Care EveryWhere ID     This is your Care EveryWhere ID. This could be used by other organizations to access your [facility] medical records  [id number]        Your Vitals Were     Pulse Temperature Pulse Oximetry             85 97  F (36.1  C) (Oral) 94%          Blood Pressure from Last 3 Encounters:   17 142/79   17 154/82   16 140/80    Weight from Last 3 Encounters:   17 (!) 319 lb (144.7 kg)   16 (!) 326 lb (147.9 kg)   16 (!) 326 lb (147.9 kg)              We Performed the Following     EKG 12-lead complete w/read - Clinics     Glucose whole blood        Primary Care Provider Office Phone # Fax #    David Andrea Dotson [phone] [address] [facility] [address]        Equal Access to Services     NICOLE GRANT : Hadii aad ku hadasho Soomaali, waaxda luqadaha, qaybta kaalmada adeegyada, waxay idiin hayarthurn mellisa martell . So [facility] [phone].    ATENCIÓN: Si habla español, tiene a duggan disposición servicios gratuitos de asistencia lingüística. Llame al [phone].    We comply with applicable federal civil rights laws and [place] laws. We do not discriminate on the basis of race, color, national origin, age, disability sex, sexual orientation or gender identity.            Thank you!     Thank you for choosing [facility]  for your care. Our goal is always to provide you with excellent care. Hearing back from our patients is one way we can continue to improve our services. Please take a few minutes to complete the written survey that you may receive in the mail after your visit with us.  Thank you!             Your Updated Medication List - Protect others around you: Learn how to safely use, store and throw away your medicines at www.disposemymeds.org.          This list is accurate as of: 9/27/17  4:42 PM.  Always use your most recent med list.                   Brand Name Dispense Instructions for use Diagnosis    * albuterol (2.5 MG/3ML) 0.083% neb solution     1 Box    Take 1 vial (2.5 mg) by nebulization every 6 hours as needed for shortness of breath / dyspnea or wheezing    Moderate persistent asthma with acute exacerbation       * albuterol 108 (90 BASE) MCG/ACT Inhaler    VENTOLIN HFA    3 Inhaler    INHALE 2 PUFFS BY MOUTH FOUR TIMES DAILY AS NEEDED FOR WHEEZING    Moderate persistent asthma without complication       amLODIPine 5 MG tablet    NORVASC    90 tablet    Take 1 tablet (5 mg) by mouth daily for blood pressure take at bedtime    Essential hypertension with goal blood pressure less than 140/90       aspirin 81 MG tablet      Take 1 tablet (81 mg) by mouth daily        atorvastatin 40 MG tablet    LIPITOR    90 tablet    Take 1 tablet (40 mg) by mouth daily At bedtime for cholesterol Pharmacy ok to hold prescription until due    Hyperlipidemia with target LDL less than 100       blood glucose monitoring meter device kit    no brand specified    1 kit    Use to test blood sugar 2 times daily or as directed. Dispense one touch or brand per insurance or pt preference.    Type 2 diabetes mellitus with diabetic chronic kidney disease (H), Type 2 diabetes, HbA1C goal < 8% (H)       blood glucose monitoring test strip    no brand specified    200 each    Use to test blood sugars 2 times daily dispense one touch or brand per insurance or pt preference.    Type 2 diabetes mellitus with chronic kidney disease on chronic dialysis, unspecified long term insulin use status (H)       buPROPion 150 MG 12 hr tablet    WELLBUTRIN SR    90 tablet    Take 1 tablet (150 mg) by mouth daily (before  lunch) For depression/energy and weight loss    Major depressive disorder, recurrent episode, moderate (H)       CYANOCOBALAMIN PO      Take 1,000 mcg by mouth daily        cyclobenzaprine 5 MG tablet    FLEXERIL    42 tablet    Take 1 tablet (5 mg) by mouth 3 times daily as needed for muscle spasms    Back muscle spasm       fluconazole 150 MG tablet    DIFLUCAN    3 tablet    Take 1 pill every 3 days (3 doses) until vaginal symptoms are gone.    Candidiasis of vagina       fluticasone 50 MCG/ACT spray    FLONASE    16 g    Spray 2 sprays into both nostrils daily as needed for rhinitis or allergies    Chronic rhinitis       fluticasone-salmeterol 500-50 MCG/DOSE diskus inhaler    ADVAIR DISKUS    3 Inhaler    Inhale 1 puff into the lungs 2 times daily For asthma control.Pharmacy ok to hold prescription until due    Moderate persistent asthma without complication       furosemide 20 MG tablet    LASIX    60 tablet    TAKE TWO TABLETS BY MOUTH EVERY DAY WITH BREAKFAST FOR BLOOD PRESSURE AND FOR SWELLING / BREATHING.    Essential hypertension with goal blood pressure less than 140/90, CHF (congestive heart failure), NYHA class I, unspecified failure chronicity, combined (H)       gabapentin 300 MG capsule    NEURONTIN    90 capsule    Take 1 capsule (300 mg) by mouth At Bedtime May increase to 2 cap at bedtime if needed Pharmacy ok to hold prescription until due    Sciatica of right side       glipiZIDE 5 MG 24 hr tablet    GLUCOTROL XL    30 tablet    TAKE ONE TABLET BY MOUTH EVERY DAY WITH BREAKFAST. FOLLOW-UP LABS / MD APPOINTMENT IN 3 MONTHS.    Type 2 diabetes mellitus with chronic kidney disease on chronic dialysis, unspecified long term insulin use status (H)       ibuprofen 600 MG tablet    ADVIL/MOTRIN    60 tablet    Take 1 tablet (600 mg) by mouth every 6 hours as needed for moderate pain    Shingles       ipratropium - albuterol 0.5 mg/2.5 mg/3 mL 0.5-2.5 (3) MG/3ML neb solution    DUONEB    30 vial     Take 1 vial (3 mLs) by nebulization every 6 hours as needed for shortness of breath / dyspnea    SOB (shortness of breath)       [employer] FINEPOINT LANCETS Misc     200 each    Use to test blood sugars 2 times daily dispense one touch brand or brand per patient preference.    Type 2 diabetes mellitus with diabetic chronic kidney disease (H), Type 2 diabetes, HbA1C goal < 8% (H)       lisinopril 20 MG tablet    PRINIVIL/ZESTRIL    180 tablet    Take 1 tablet (20 mg) by mouth 2 times daily For blood pressure Pharmacy ok to hold prescription until due    Essential hypertension with goal blood pressure less than 140/90       MELATONIN PO      Take 5 mg by mouth nightly as needed        metFORMIN 500 MG 24 hr tablet    GLUCOPHAGE-XR    180 tablet    TAKE 1 TABLET BY MOUTH 2 TIMES DAILY. TAKE WITH MEALS. FOR DIABETES. THIS IS DOUBLE DOSAGE. REPEAT LABS IN 3 MONTHS    Type 2 diabetes mellitus with stage 1 chronic kidney disease, without long-term current use of insulin (H)       METOPROLOL SUCCINATE ER PO      Take 25 mg by mouth daily        montelukast 10 MG tablet    SINGULAIR    90 tablet    Take 1 tablet (10 mg) by mouth daily For asthma/allergies Pharmacy ok to hold prescription until due    Chronic rhinitis, Moderate persistent asthma without complication       Multi-vitamin Tabs tablet      Take 1 tablet by mouth daily        nystatin 843709 UNIT/GM Powd    MYCOSTATIN    1 Bottle    Apply 1 g topically 4 times daily    Candidiasis of skin       OMEGA-3 FISH OIL PO      Take 2 g by mouth daily        pantoprazole 40 MG EC tablet    PROTONIX    90 tablet    Take 1 tablet (40 mg) by mouth daily For heartburn. Take 30-60 minutes before a meal.Pharmacy ok to hold prescription until due    Gastroesophageal reflux disease without esophagitis       PARoxetine 20 MG tablet    PAXIL    90 tablet    Take 1 tablet (20 mg) by mouth At Bedtime For anxiety - this is 1/2 dosage from before    TEX (generalized anxiety disorder)        polyethylene glycol powder    MIRALAX    510 g    Take 17 g by mouth daily as needed for constipation    Constipation       * Notice:  This list has 2 medication(s) that are the same as other medications prescribed for you. Read the directions carefully, and ask your doctor or other care provider to review them with you.

## 2017-09-27 NOTE — PROGRESS NOTES
"  SUBJECTIVE:   Catherine Ferreira is a 71 year old female who presents to clinic today for the following health issues:      Patient c/o left calf pain that started this afternoon while she was walking, the pain so extreme she still can not walk. Patient also started having dizziness that started this morning, she was at heart dr getting pacemaker check and they gave her juice. Patient is still feeling lightheaded/ dizzy, unable to walk due to pain in leg.    {additional problems for provider to add:775910}    Problem list and histories reviewed & adjusted, as indicated.  Additional history: {NONE - AS DOCUMENTED:185827::\"as documented\"}    {HIST REVIEW/ LINKS 2:040127}    Reviewed and updated as needed this visit by clinical staff  Allergies  Meds       Reviewed and updated as needed this visit by Provider         {PROVIDER CHARTING PREFERENCE:196430}  "

## 2017-09-27 NOTE — NURSING NOTE
"Chief Complaint   Patient presents with     Dizziness     started this AM     Leg Pain     left calf, started today       Initial /79  Pulse 85  Temp 97  F (36.1  C) (Oral)  SpO2 94% Estimated body mass index is 54.59 kg/(m^2) as calculated from the following:    Height as of 3/28/16: 5' 4.1\" (1.628 m).    Weight as of 6/5/17: 319 lb (144.7 kg).  Medication Reconciliation: complete   Montserrat Stoll CMA      "

## 2017-09-27 NOTE — PROGRESS NOTES
HPI  Catherine Ferreira is a 71 year old female who presents today with acute L lower leg pain which started today and dizziness for the past 1month.  Also accompanied by sister today as well.    1) Has known history of CHF with pacemaker present, CAD, diabetes uncontrolled, asthma, and morbid obesity.  Developed acute left lower leg pain after walking to the [employer] today. Pain seems to be localized to the back of her knee without any radiation, numbness, tingling or weakness.  Has some swelling in her leg which seem more than usual but no redness, drainage or fevers.  She does sit for 8 hours a day in her chair at home due to her weight. Otherwise no recent airplane ride or surgeries.  She has not been able to bear weight or ambulate due to the pain and is in a wheelchair today.  Has not tried much for her pain.  2)  Also complains of dizziness on and off for the past 1 month which seem to be stable.  States that she had her pacemaker checked today which was all clear per patient report.  No HA, visual disturbances, one sided weakness or slurred speech.  Has some stable shortness of breath which she attributes to her asthma but no chest pain, palpitations, orthopnea, PND or peripheral edema.  No numbness, tingling or weakness of the extremities. No nausea, vomiting or diaphoresis. Dizziness is worse with changes in position and movement and is relieved with sitting still.  No recent URI symptoms or fevers.  No ringing in her ears or loss of hearing.  She has tried drinking fluids with minimal relief. States that her blood sugars have been in the 160s range.      Reviewed PMH.  Patient Active Problem List   Diagnosis     Moderate persistent asthma     GERD (gastroesophageal reflux disease)     Hyperlipidemia with target LDL less than 100     Exogenous obesity     Hypertension goal BP (blood pressure) < 140/90     Advanced directives, counseling/discussion     Mixed anxiety depressive disorder     Pacemaker      Type 2 diabetes, HbA1C goal < 8% (H)     Tendency toward bleeding easily (H)     Chronic rhinitis     CHF (congestive heart failure), NYHA class I (H)     Health Care Home     Urinary incontinence     Straining to void     Rectocele     Cystocele, midline     Allergic rhinitis     Shingles     Constipation     Abdominal pain, unspecified abdominal location     Type 2 diabetes mellitus with diabetic chronic kidney disease     Other constipation     Gastroesophageal reflux disease without esophagitis     Major depressive disorder, recurrent episode, moderate (H)     CHF (congestive heart failure), NYHA class I, unspecified failure chronicity, combined (H)     Type 2 diabetes mellitus with diabetic chronic kidney disease, unspecified CKD stage     Essential hypertension with goal blood pressure less than 140/90     TEX (generalized anxiety disorder)     Type 2 diabetes mellitus with chronic kidney disease on chronic dialysis, unspecified long term insulin use status (H)     Gastroesophageal reflux disease with esophagitis     Moderate persistent asthma without complication     Current Outpatient Prescriptions   Medication Sig Dispense Refill     furosemide (LASIX) 20 MG tablet TAKE TWO TABLETS BY MOUTH EVERY DAY WITH BREAKFAST FOR BLOOD PRESSURE AND FOR SWELLING / BREATHING. 60 tablet 0     glipiZIDE (GLUCOTROL XL) 5 MG 24 hr tablet TAKE ONE TABLET BY MOUTH EVERY DAY WITH BREAKFAST. FOLLOW-UP LABS / MD APPOINTMENT IN 3 MONTHS. 30 tablet 0     metFORMIN (GLUCOPHAGE-XR) 500 MG 24 hr tablet TAKE 1 TABLET BY MOUTH 2 TIMES DAILY. TAKE WITH MEALS. FOR DIABETES. THIS IS DOUBLE DOSAGE. REPEAT LABS IN 3 MONTHS 180 tablet 0     fluconazole (DIFLUCAN) 150 MG tablet Take 1 pill every 3 days (3 doses) until vaginal symptoms are gone. 3 tablet 0     aspirin 81 MG tablet Take 1 tablet (81 mg) by mouth daily       albuterol (VENTOLIN HFA) 108 (90 BASE) MCG/ACT Inhaler INHALE 2 PUFFS BY MOUTH FOUR TIMES DAILY AS NEEDED FOR WHEEZING 3  Inhaler 2     atorvastatin (LIPITOR) 40 MG tablet Take 1 tablet (40 mg) by mouth daily At bedtime for cholesterol Pharmacy ok to hold prescription until due 90 tablet 1     pantoprazole (PROTONIX) 40 MG EC tablet Take 1 tablet (40 mg) by mouth daily For heartburn. Take 30-60 minutes before a meal.Pharmacy ok to hold prescription until due 90 tablet 1     lisinopril (PRINIVIL/ZESTRIL) 20 MG tablet Take 1 tablet (20 mg) by mouth 2 times daily For blood pressure Pharmacy ok to hold prescription until due 180 tablet 1     PARoxetine (PAXIL) 20 MG tablet Take 1 tablet (20 mg) by mouth At Bedtime For anxiety - this is 1/2 dosage from before 90 tablet 1     fluticasone-salmeterol (ADVAIR DISKUS) 500-50 MCG/DOSE diskus inhaler Inhale 1 puff into the lungs 2 times daily For asthma control.Pharmacy ok to hold prescription until due 3 Inhaler 1     gabapentin (NEURONTIN) 300 MG capsule Take 1 capsule (300 mg) by mouth At Bedtime May increase to 2 cap at bedtime if needed Pharmacy ok to hold prescription until due 90 capsule 1     buPROPion (WELLBUTRIN SR) 150 MG 12 hr tablet Take 1 tablet (150 mg) by mouth daily (before lunch) For depression/energy and weight loss 90 tablet 1     amLODIPine (NORVASC) 5 MG tablet Take 1 tablet (5 mg) by mouth daily for blood pressure take at bedtime 90 tablet 1     montelukast (SINGULAIR) 10 MG tablet Take 1 tablet (10 mg) by mouth daily For asthma/allergies Pharmacy ok to hold prescription until due 90 tablet 1     blood glucose monitoring (NO BRAND SPECIFIED) test strip Use to test blood sugars 2 times daily dispense one touch or brand per insurance or pt preference. 200 each 1     METOPROLOL SUCCINATE ER PO Take 25 mg by mouth daily       albuterol (2.5 MG/3ML) 0.083% nebulizer solution Take 1 vial (2.5 mg) by nebulization every 6 hours as needed for shortness of breath / dyspnea or wheezing 1 Box 2     fluticasone (FLONASE) 50 MCG/ACT nasal spray Spray 2 sprays into both nostrils daily as  needed for rhinitis or allergies 16 g 11     cyclobenzaprine (FLEXERIL) 5 MG tablet Take 1 tablet (5 mg) by mouth 3 times daily as needed for muscle spasms 42 tablet 1     blood glucose monitoring (NO BRAND SPECIFIED) meter device kit Use to test blood sugar 2 times daily or as directed. Dispense one touch or brand per insurance or pt preference. 1 kit 0     LIFESCAN FINEPOINT LANCETS MISC Use to test blood sugars 2 times daily dispense one touch brand or brand per patient preference. 200 each 1     CYANOCOBALAMIN PO Take 1,000 mcg by mouth daily       polyethylene glycol (MIRALAX) powder Take 17 g by mouth daily as needed for constipation 510 g 1     MELATONIN PO Take 5 mg by mouth nightly as needed       Omega-3 Fatty Acids (OMEGA-3 FISH OIL PO) Take 2 g by mouth daily       multivitamin, therapeutic with minerals (MULTI-VITAMIN) TABS Take 1 tablet by mouth daily       nystatin (MYCOSTATIN) 126370 UNIT/GM POWD Apply 1 g topically 4 times daily 1 Bottle 1     ibuprofen (ADVIL,MOTRIN) 600 MG tablet Take 1 tablet (600 mg) by mouth every 6 hours as needed for moderate pain 60 tablet 0     ipratropium - albuterol 0.5 mg/2.5 mg/3 mL (DUONEB) 0.5-2.5 (3) MG/3ML nebulization Take 1 vial (3 mLs) by nebulization every 6 hours as needed for shortness of breath / dyspnea 30 vial 1     Allergies   Allergen Reactions     Asa [Aspirin]      History of low platelets, told to avoid asa     Augmentin Hives     Levaquin Hives     Penicillins Other (See Comments)     Unsure of reaction       Review of Systems   Constitutional: Negative.  Negative for diaphoresis, fever and weight loss.   HENT: Negative.    Eyes: Negative for pain.   Respiratory: Negative.  Negative for cough and hemoptysis.    Cardiovascular: Positive for leg swelling. Negative for chest pain and palpitations.   Gastrointestinal: Negative.    Musculoskeletal: Positive for joint pain.   Skin: Negative.    Neurological: Positive for dizziness. Negative for tingling,  tremors, sensory change, speech change, focal weakness, loss of consciousness and headaches.   Endo/Heme/Allergies: Negative for environmental allergies.   All other systems reviewed and are negative.        Physical Exam   Constitutional: She is oriented to person, place, and time and well-developed, well-nourished, and in no distress. No distress.   Morbidly obese and in wheelchair   HENT:   Head: Normocephalic and atraumatic.   Nose: Nose normal.   Mouth/Throat: Uvula is midline, oropharynx is clear and moist and mucous membranes are normal. No oropharyngeal exudate or posterior oropharyngeal erythema.   TMs are intact without any erythema or bulging bilaterally.  Airway is patent.   Eyes: Conjunctivae and EOM are normal. Pupils are equal, round, and reactive to light. No scleral icterus.   Neck: Normal range of motion. Neck supple. No thyromegaly present.   Cardiovascular: Normal rate, regular rhythm, normal heart sounds and intact distal pulses.  Exam reveals no gallop and no friction rub.    No murmur heard.  Pulmonary/Chest: Effort normal and breath sounds normal. No accessory muscle usage. No respiratory distress. She has no decreased breath sounds. She has no wheezes. She has no rhonchi. She has no rales.   Musculoskeletal:        Right knee: She exhibits normal range of motion, no swelling and no effusion. No tenderness found.        Left knee: She exhibits swelling. She exhibits normal range of motion, no effusion, no ecchymosis, no deformity, no laceration, no erythema, normal alignment, no LCL laxity, normal patellar mobility, normal meniscus and no MCL laxity. Tenderness (Present in the popliteal region without any palpable masses) found. No medial joint line, no lateral joint line, no MCL, no LCL and no patellar tendon tenderness noted.        Left lower leg: She exhibits tenderness, swelling and edema (1+ pitting edema). She exhibits no bony tenderness, no deformity and no laceration.   Negative  maico's sign bilaterally. Non-tender, varicose veins present.   Lymphadenopathy:     She has no cervical adenopathy.   Neurological: She is alert and oriented to person, place, and time. She has normal sensation, normal strength, normal reflexes and intact cranial nerves. She has a normal Romberg Test. She shows no pronator drift. Gait abnormal.   Skin: Skin is warm, dry and intact. No rash noted.   Distal pulses are 2+ and symmetric.     Psychiatric: Mood, memory, affect and judgment normal.   Nursing note and vitals reviewed.  EKG:  NSR, right axis deviation, 69bpm.  Occasional PACs and left bundle branch block which are new compared to previous EKG.  ST depressions in inferior leads which is stable compared to previous EKG.  Personally reviewed.      Assessment/Plan:   Dizziness:  New changes present on EKG.  She does have a pacemaker in place.  No focal neurologic deficits on exam. Blood sugar was normal at 128.  Due to her multiple risk factors, H&P is concerning for CVA versus TIA versus vertigo.  Recommend further evaluation and management in the ER.  Patient has agreed to be transported via ambulance.  [facility] has been notified.  F/u with PCP after ER visit.    -     EKG 12-lead complete w/read - Clinics  -     Glucose whole blood    Pain of left lower leg:  No precipitating trauma or injuries.  She is primarily immobile at home due to her weight. H&P is concerning for DVT vs thrombophleblitis vs strain/sprain.  Recommend further evaluation and management in the ER to r/o DVT.  Patient has agreed to be transported via ambulance.  [facility] has been notified.  F/u with PCP after ER visit.       Abnormal finding on EKG    Morbid obesity, unspecified obesity type (H)    CHF (congestive heart failure), NYHA class I, unspecified failure chronicity, combined (H)    Pacemaker    Type 2 diabetes mellitus with chronic kidney disease, without long-term current use of insulin, unspecified CKD stage  (H)        Shannen Pepper PA-C    Chart reviewed.  Encounter was reviewed with provider after the visit.  Patient was not examined by me.  Khadra Servin M.D.

## 2017-09-27 NOTE — Clinical Note
Agree. High risk patient with signs or symptoms of DVT and dizziness needing urgent evaluation and rule out. Khadra Servin M.D.

## 2017-10-02 DIAGNOSIS — J31.0 CHRONIC RHINITIS: ICD-10-CM

## 2017-10-02 DIAGNOSIS — I50.40 CHF (CONGESTIVE HEART FAILURE), NYHA CLASS I, UNSPECIFIED FAILURE CHRONICITY, COMBINED (H): ICD-10-CM

## 2017-10-02 DIAGNOSIS — I10 ESSENTIAL HYPERTENSION WITH GOAL BLOOD PRESSURE LESS THAN 140/90: ICD-10-CM

## 2017-10-02 DIAGNOSIS — J45.40 MODERATE PERSISTENT ASTHMA WITHOUT COMPLICATION: ICD-10-CM

## 2017-10-04 RX ORDER — FUROSEMIDE 20 MG
TABLET ORAL
Qty: 180 TABLET | Refills: 0 | OUTPATIENT
Start: 2017-10-04

## 2017-10-04 RX ORDER — MONTELUKAST SODIUM 10 MG/1
TABLET ORAL
Qty: 90 TABLET | Refills: 1 | OUTPATIENT
Start: 2017-10-04

## 2017-10-06 ENCOUNTER — OFFICE VISIT (OUTPATIENT)
Dept: FAMILY MEDICINE | Facility: CLINIC | Age: 72
End: 2017-10-06
Payer: COMMERCIAL

## 2017-10-06 ENCOUNTER — RADIANT APPOINTMENT (OUTPATIENT)
Dept: GENERAL RADIOLOGY | Facility: CLINIC | Age: 72
End: 2017-10-06
Attending: FAMILY MEDICINE
Payer: COMMERCIAL

## 2017-10-06 ENCOUNTER — DOCUMENTATION ONLY (OUTPATIENT)
Dept: LAB | Facility: CLINIC | Age: 72
End: 2017-10-06

## 2017-10-06 VITALS
HEART RATE: 88 BPM | TEMPERATURE: 97.6 F | SYSTOLIC BLOOD PRESSURE: 139 MMHG | DIASTOLIC BLOOD PRESSURE: 78 MMHG | OXYGEN SATURATION: 93 %

## 2017-10-06 DIAGNOSIS — E78.5 HYPERLIPIDEMIA WITH TARGET LDL LESS THAN 100: Primary | ICD-10-CM

## 2017-10-06 DIAGNOSIS — J45.40 MODERATE PERSISTENT ASTHMA WITHOUT COMPLICATION: ICD-10-CM

## 2017-10-06 DIAGNOSIS — Z99.2 TYPE 2 DIABETES MELLITUS WITH CHRONIC KIDNEY DISEASE ON CHRONIC DIALYSIS, UNSPECIFIED LONG TERM INSULIN USE STATUS: ICD-10-CM

## 2017-10-06 DIAGNOSIS — N18.6 TYPE 2 DIABETES MELLITUS WITH CHRONIC KIDNEY DISEASE ON CHRONIC DIALYSIS, UNSPECIFIED LONG TERM INSULIN USE STATUS: ICD-10-CM

## 2017-10-06 DIAGNOSIS — M79.7 FIBROMYALGIA: ICD-10-CM

## 2017-10-06 DIAGNOSIS — M25.562 LEFT KNEE PAIN, UNSPECIFIED CHRONICITY: ICD-10-CM

## 2017-10-06 DIAGNOSIS — I10 HYPERTENSION GOAL BP (BLOOD PRESSURE) < 140/90: ICD-10-CM

## 2017-10-06 DIAGNOSIS — M25.562 LEFT KNEE PAIN, UNSPECIFIED CHRONICITY: Primary | ICD-10-CM

## 2017-10-06 DIAGNOSIS — E11.22 TYPE 2 DIABETES MELLITUS WITH CHRONIC KIDNEY DISEASE ON CHRONIC DIALYSIS, UNSPECIFIED LONG TERM INSULIN USE STATUS: ICD-10-CM

## 2017-10-06 PROCEDURE — 99214 OFFICE O/P EST MOD 30 MIN: CPT | Performed by: FAMILY MEDICINE

## 2017-10-06 PROCEDURE — 73560 X-RAY EXAM OF KNEE 1 OR 2: CPT | Mod: LT

## 2017-10-06 RX ORDER — TRAMADOL HYDROCHLORIDE 50 MG/1
50 TABLET ORAL EVERY 8 HOURS PRN
Qty: 40 TABLET | Refills: 1 | Status: SHIPPED | OUTPATIENT
Start: 2017-10-06 | End: 2017-11-30

## 2017-10-06 NOTE — PROGRESS NOTES
Please review lab orders sign and close encounter. Tamia Butt MA/RAYSHAWN    Diabetes appt 10/20/17

## 2017-10-06 NOTE — MR AVS SNAPSHOT
After Visit Summary   10/6/2017    Catherine Ferreira    MRN: [id number]           Patient Information     Date Of Birth          1945        Visit Information        Provider Department      10/6/2017 2:40 PM David Dotson MD [facility]        Today's Diagnoses     Left knee pain, unspecified chronicity    -  1    Moderate persistent asthma without complication        Fibromyalgia           Follow-ups after your visit        Additional Services     ORTHOPEDICS ADULT REFERRAL       Your provider has referred you to: [facility]: [facility] [phone]   [email or web address]  [facility]: [facility] [phone]   [email or web address]    Please be aware that coverage of these services is subject to the terms and limitations of your health insurance plan.  Call member services at your health plan with any benefit or coverage questions.      Please bring the following to your appointment:    >>   Any x-rays, CTs or MRIs which have been performed.  Contact the facility where they were done to arrange for  prior to your scheduled appointment.    >>   List of current medications   >>   This referral request   >>   Any documents/labs given to you for this referral                  Your next 10 appointments already scheduled     Oct 13, 2017 11:00 AM CDT   LAB with AN LAB   [facility] ([facility])    [address] [facility] [address] [phone]           Patient must bring picture ID. Patient should be prepared to give a urine specimen  Please do not eat 10-12 hours before your appointment if you are coming in fasting for labs on lipids, cholesterol, or glucose (sugar). Pregnant women should follow their Care Team instructions. Water with medications is okay. Do not drink coffee or other fluids. If you have concerns about taking  your medications,  "please ask at office or if scheduling via Shopparity, send a message by clicking on Secure Messaging, Message Your Care Team.            Oct 20, 2017  1:40 PM CDT   Office Visit with David Dotson MD   [facility] ([facility]    [address] [facility] [address] [phone]           Bring a current list of meds and any records pertaining to this visit. For Physicals, please bring immunization records and any forms needing to be filled out. Please arrive 10 minutes early to complete paperwork.              Who to contact     If you have questions or need follow up information about today's clinic visit or your schedule please contact [facility] directly at [phone].  Normal or non-critical lab and imaging results will be communicated to you by KARALIThart, letter or phone within 4 business days after the clinic has received the results. If you do not hear from us within 7 days, please contact the clinic through KARALIThart or phone. If you have a critical or abnormal lab result, we will notify you by phone as soon as possible.  Submit refill requests through Shopparity or call your pharmacy and they will forward the refill request to us. Please allow 3 business days for your refill to be completed.          Additional Information About Your Visit        Shopparity Information     Shopparity lets you send messages to your doctor, view your test results, renew your prescriptions, schedule appointments and more. To sign up, go to [email or web address] . Click on \"Log in\" on the left side of the screen, which will take you to the Welcome page. Then click on \"Sign up Now\" on the right side of the page.     You will be asked to enter the access code listed below, as well as some personal information. Please follow the directions to create your username and password.     Your access code is: [email or web address]  Expires: 2017  4:42 PM     Your access code will  in 90 days. " If you need help or a new code, please call your [place] clinic or [phone].        Care EveryWhere ID     This is your Care EveryWhere ID. This could be used by other organizations to access your [place] medical records  [id number]        Your Vitals Were     Pulse Temperature Pulse Oximetry             88 97.6  F (36.4  C) (Oral) 93%          Blood Pressure from Last 3 Encounters:   10/06/17 139/78   09/27/17 142/79   06/05/17 154/82    Weight from Last 3 Encounters:   06/05/17 (!) 319 lb (144.7 kg)   11/22/16 (!) 326 lb (147.9 kg)   09/08/16 (!) 326 lb (147.9 kg)              We Performed the Following     ORTHOPEDICS ADULT REFERRAL          Today's Medication Changes          These changes are accurate as of: 10/6/17  3:01 PM.  If you have any questions, ask your nurse or doctor.               Start taking these medicines.        Dose/Directions    traMADol 50 MG tablet   Commonly known as:  ULTRAM   Used for:  Left knee pain, unspecified chronicity, Fibromyalgia   Started by:  David Dotson MD        Dose:  50 mg   Take 1 tablet (50 mg) by mouth every 8 hours as needed for pain maximum 3 tablet(s) per day. Can start with 1/2 pill   Quantity:  40 tablet   Refills:  1            Where to get your medicines      Some of these will need a paper prescription and others can be bought over the counter.  Ask your nurse if you have questions.     Bring a paper prescription for each of these medications     traMADol 50 MG tablet                Primary Care Provider Office Phone # Fax #    David Dotson [phone] [address] [facility] [address]        Equal Access to Services     [facility]: Hadii arlene Ugalde, waaxda luqadaha, qaybta kaalmikhail melissa. So [facility] [phone].    ATENCIÓN: Si habla español, tiene a duggan disposición servicios gratuitos de asistencia lingüística. Llame al [phone].    We comply with  applicable federal civil rights laws and [place] laws. We do not discriminate on the basis of race, color, national origin, age, disability, sex, sexual orientation, or gender identity.            Thank you!     Thank you for choosing [facility]  [facility]. Our goal is always to provide you with excellent care. Hearing back from our patients is one way we can continue to improve our services. Please take a few minutes to complete the written survey that you may receive in the mail after your visit with us. Thank you!             Your Updated Medication List - Protect others around you: Learn how to safely use, store and throw away your medicines at www.disposemymeds.org.          This list is accurate as of: 10/6/17  3:01 PM.  Always use your most recent med list.                   Brand Name Dispense Instructions for use Diagnosis    * albuterol (2.5 MG/3ML) 0.083% neb solution     1 Box    Take 1 vial (2.5 mg) by nebulization every 6 hours as needed for shortness of breath / dyspnea or wheezing    Moderate persistent asthma with acute exacerbation       * albuterol 108 (90 BASE) MCG/ACT Inhaler    VENTOLIN HFA    3 Inhaler    INHALE 2 PUFFS BY MOUTH FOUR TIMES DAILY AS NEEDED FOR WHEEZING    Moderate persistent asthma without complication       amLODIPine 5 MG tablet    NORVASC    90 tablet    Take 1 tablet (5 mg) by mouth daily for blood pressure take at bedtime    Essential hypertension with goal blood pressure less than 140/90       aspirin 81 MG tablet      Take 1 tablet (81 mg) by mouth daily        atorvastatin 40 MG tablet    LIPITOR    90 tablet    Take 1 tablet (40 mg) by mouth daily At bedtime for cholesterol Pharmacy ok to hold prescription until due    Hyperlipidemia with target LDL less than 100       blood glucose monitoring meter device kit    no brand specified    1 kit    Use to test blood sugar 2 times daily or as directed. Dispense one touch or brand per insurance or pt  preference.    Type 2 diabetes mellitus with diabetic chronic kidney disease (H), Type 2 diabetes, HbA1C goal < 8% (H)       blood glucose monitoring test strip    no brand specified    200 each    Use to test blood sugars 2 times daily dispense one touch or brand per insurance or pt preference.    Type 2 diabetes mellitus with chronic kidney disease on chronic dialysis, unspecified long term insulin use status (H)       buPROPion 150 MG 12 hr tablet    WELLBUTRIN SR    90 tablet    Take 1 tablet (150 mg) by mouth daily (before lunch) For depression/energy and weight loss    Major depressive disorder, recurrent episode, moderate (H)       CYANOCOBALAMIN PO      Take 1,000 mcg by mouth daily        cyclobenzaprine 5 MG tablet    FLEXERIL    42 tablet    Take 1 tablet (5 mg) by mouth 3 times daily as needed for muscle spasms    Back muscle spasm       fluconazole 150 MG tablet    DIFLUCAN    3 tablet    Take 1 pill every 3 days (3 doses) until vaginal symptoms are gone.    Candidiasis of vagina       fluticasone 50 MCG/ACT spray    FLONASE    16 g    Spray 2 sprays into both nostrils daily as needed for rhinitis or allergies    Chronic rhinitis       fluticasone-salmeterol 500-50 MCG/DOSE diskus inhaler    ADVAIR DISKUS    3 Inhaler    Inhale 1 puff into the lungs 2 times daily For asthma control.Pharmacy ok to hold prescription until due    Moderate persistent asthma without complication       furosemide 20 MG tablet    LASIX    60 tablet    TAKE TWO TABLETS BY MOUTH EVERY DAY WITH BREAKFAST FOR BLOOD PRESSURE AND FOR SWELLING / BREATHING.    Essential hypertension with goal blood pressure less than 140/90, CHF (congestive heart failure), NYHA class I, unspecified failure chronicity, combined (H)       gabapentin 300 MG capsule    NEURONTIN    90 capsule    Take 1 capsule (300 mg) by mouth At Bedtime May increase to 2 cap at bedtime if needed Pharmacy ok to hold prescription until due    Sciatica of right side        glipiZIDE 5 MG 24 hr tablet    GLUCOTROL XL    30 tablet    TAKE ONE TABLET BY MOUTH EVERY DAY WITH BREAKFAST. FOLLOW-UP LABS / MD APPOINTMENT IN 3 MONTHS.    Type 2 diabetes mellitus with chronic kidney disease on chronic dialysis, unspecified long term insulin use status (H)       ibuprofen 600 MG tablet    ADVIL/MOTRIN    60 tablet    Take 1 tablet (600 mg) by mouth every 6 hours as needed for moderate pain    Shingles       ipratropium - albuterol 0.5 mg/2.5 mg/3 mL 0.5-2.5 (3) MG/3ML neb solution    DUONEB    30 vial    Take 1 vial (3 mLs) by nebulization every 6 hours as needed for shortness of breath / dyspnea    SOB (shortness of breath)       [employer]ani[employer] FINEPOINT LANCETS Misc     200 each    Use to test blood sugars 2 times daily dispense one touch brand or brand per patient preference.    Type 2 diabetes mellitus with diabetic chronic kidney disease (H), Type 2 diabetes, HbA1C goal < 8% (H)       lisinopril 20 MG tablet    PRINIVIL/ZESTRIL    180 tablet    Take 1 tablet (20 mg) by mouth 2 times daily For blood pressure Pharmacy ok to hold prescription until due    Essential hypertension with goal blood pressure less than 140/90       MELATONIN PO      Take 5 mg by mouth nightly as needed        metFORMIN 500 MG 24 hr tablet    GLUCOPHAGE-XR    180 tablet    TAKE 1 TABLET BY MOUTH 2 TIMES DAILY. TAKE WITH MEALS. FOR DIABETES. THIS IS DOUBLE DOSAGE. REPEAT LABS IN 3 MONTHS    Type 2 diabetes mellitus with stage 1 chronic kidney disease, without long-term current use of insulin (H)       METOPROLOL SUCCINATE ER PO      Take 25 mg by mouth daily        montelukast 10 MG tablet    SINGULAIR    90 tablet    Take 1 tablet (10 mg) by mouth daily For asthma/allergies Pharmacy ok to hold prescription until due    Chronic rhinitis, Moderate persistent asthma without complication       Multi-vitamin Tabs tablet      Take 1 tablet by mouth daily        nystatin 200720 UNIT/GM Powd    MYCOSTATIN    1 Bottle    Apply 1 g  topically 4 times daily    Candidiasis of skin       OMEGA-3 FISH OIL PO      Take 2 g by mouth daily        pantoprazole 40 MG EC tablet    PROTONIX    90 tablet    Take 1 tablet (40 mg) by mouth daily For heartburn. Take 30-60 minutes before a meal.Pharmacy ok to hold prescription until due    Gastroesophageal reflux disease without esophagitis       PARoxetine 20 MG tablet    PAXIL    90 tablet    Take 1 tablet (20 mg) by mouth At Bedtime For anxiety - this is 1/2 dosage from before    TEX (generalized anxiety disorder)       polyethylene glycol powder    MIRALAX    510 g    Take 17 g by mouth daily as needed for constipation    Constipation       traMADol 50 MG tablet    ULTRAM    40 tablet    Take 1 tablet (50 mg) by mouth every 8 hours as needed for pain maximum 3 tablet(s) per day. Can start with 1/2 pill    Left knee pain, unspecified chronicity, Fibromyalgia       * Notice:  This list has 2 medication(s) that are the same as other medications prescribed for you. Read the directions carefully, and ask your doctor or other care provider to review them with you.

## 2017-11-07 ENCOUNTER — TELEPHONE (OUTPATIENT)
Dept: FAMILY MEDICINE | Facility: CLINIC | Age: 72
End: 2017-11-07

## 2017-11-07 NOTE — TELEPHONE ENCOUNTER
PHQ-9 due now for patient ( 5-7 months from index date)  Index date:       6/5/17  Index PHQ9 :   24  FU start date:   11/5/17  FU End date :   1/5/18    RN- Contact patient for PHQ-9. Remission considered if follow up PHQ-9 less than 5.  If greater than 5 consider follow up appointment, e-visit for medication follow up and evaluation.

## 2017-11-08 ASSESSMENT — PATIENT HEALTH QUESTIONNAIRE - PHQ9: SUM OF ALL RESPONSES TO PHQ QUESTIONS 1-9: 8

## 2017-11-08 NOTE — TELEPHONE ENCOUNTER
Pt completed PHQ-9 with score of 8.  This is an improved score.  Pt states it will even be better in one month.  Right now she is working on moving out of her house, she has lived there for 50 years.  It is hard leaving her stuff.  She is ok with moving into a senior apartment complex. To provider as FYI.  Farida Silva RN

## 2017-11-14 ENCOUNTER — TELEPHONE (OUTPATIENT)
Dept: PHARMACY | Facility: CLINIC | Age: 72
End: 2017-11-14

## 2017-11-14 NOTE — TELEPHONE ENCOUNTER
Catherine called today to inform me that she is moving away from [place] and will no longer be using [facility] services for care of her medications. We will stop reaching out to the patient at this time.       Routing to PCP as ANGEL Pineda, Pharm.D., BCACP  Medication Therapy Management Pharmacist  Page/VM:  [phone]

## 2017-11-24 DIAGNOSIS — N18.6 TYPE 2 DIABETES MELLITUS WITH CHRONIC KIDNEY DISEASE ON CHRONIC DIALYSIS, UNSPECIFIED LONG TERM INSULIN USE STATUS: ICD-10-CM

## 2017-11-24 DIAGNOSIS — Z99.2 TYPE 2 DIABETES MELLITUS WITH CHRONIC KIDNEY DISEASE ON CHRONIC DIALYSIS, UNSPECIFIED LONG TERM INSULIN USE STATUS: ICD-10-CM

## 2017-11-24 DIAGNOSIS — E11.22 TYPE 2 DIABETES MELLITUS WITH CHRONIC KIDNEY DISEASE ON CHRONIC DIALYSIS, UNSPECIFIED LONG TERM INSULIN USE STATUS: ICD-10-CM

## 2017-11-24 RX ORDER — GLIPIZIDE 5 MG/1
TABLET, FILM COATED, EXTENDED RELEASE ORAL
Qty: 30 TABLET | Refills: 0 | Status: SHIPPED | OUTPATIENT
Start: 2017-11-24 | End: 2018-03-01

## 2017-11-24 NOTE — TELEPHONE ENCOUNTER
Medication is being filled for 1 time refill only due to:  Patient needs to be seen because due for fasting lab appt and ov.   Farida Silva RN

## 2017-11-24 NOTE — LETTER
November 27, 2017    Catherine Ferreira  [address] [facility] [address]    Dear Catherine,       We recently received a refill request for glipiZIDE (GLUCOTROL XL) 5 MG 24 hr tablet.  We have refilled this for a one time 30 day supply only because you are due for a:    Diabetes office visit and fasting lab appointment      Please schedule this lab appointment 4-5 days prior to the office visit.     Please call at your earliest convenience so that there will not be a delay with your future refills.          Thank you,   Your [facility] Team/  [phone]

## 2017-11-30 DIAGNOSIS — M79.7 FIBROMYALGIA: ICD-10-CM

## 2017-11-30 DIAGNOSIS — M25.562 LEFT KNEE PAIN, UNSPECIFIED CHRONICITY: ICD-10-CM

## 2017-11-30 NOTE — TELEPHONE ENCOUNTER
Controlled Substance Refill Request for ultram  Problem List Complete:  No     PROVIDER TO CONSIDER COMPLETION OF PROBLEM LIST AND OVERVIEW/CONTROLLED SUBSTANCE AGREEMENT    Last Written Prescription Date:  10/6/17  Last Fill Quantity: 40,   # refills: 1    Last Office Visit with [facility] primary care provider: 10/6/17    Future Office visit: none    Controlled substance agreement on file: No.     checked in past 6 months?  Yes 11/30/17 no concerns   Shelby Torres RN

## 2017-12-01 RX ORDER — TRAMADOL HYDROCHLORIDE 50 MG/1
50 TABLET ORAL EVERY 8 HOURS PRN
Qty: 20 TABLET | Refills: 1 | Status: SHIPPED | OUTPATIENT
Start: 2017-12-01 | End: 2018-03-01

## 2017-12-21 DIAGNOSIS — E11.22 TYPE 2 DIABETES MELLITUS WITH STAGE 1 CHRONIC KIDNEY DISEASE, WITHOUT LONG-TERM CURRENT USE OF INSULIN (H): ICD-10-CM

## 2017-12-21 DIAGNOSIS — I10 ESSENTIAL HYPERTENSION WITH GOAL BLOOD PRESSURE LESS THAN 140/90: ICD-10-CM

## 2017-12-21 DIAGNOSIS — N18.1 TYPE 2 DIABETES MELLITUS WITH STAGE 1 CHRONIC KIDNEY DISEASE, WITHOUT LONG-TERM CURRENT USE OF INSULIN (H): ICD-10-CM

## 2017-12-21 RX ORDER — METFORMIN HCL 500 MG
500 TABLET, EXTENDED RELEASE 24 HR ORAL 2 TIMES DAILY WITH MEALS
Qty: 60 TABLET | Refills: 0 | Status: SHIPPED | OUTPATIENT
Start: 2017-12-21 | End: 2018-02-15

## 2017-12-21 RX ORDER — LISINOPRIL 20 MG/1
TABLET ORAL
Qty: 60 TABLET | Refills: 0 | Status: SHIPPED | OUTPATIENT
Start: 2017-12-21 | End: 2018-03-01

## 2017-12-21 NOTE — LETTER
December 21, 2017    Catherine Ferreira  [address] [facility] [address]    Dear Catherine,       We recently received a refill request for lisinopril and metformin.  We have refilled this for a one time 30 day supply only because you are due for a:    Blood pressure and diabetes office visit and fasting lab appointment      Please schedule this lab appointment 4-5 days prior to the office visit.     Please call at your earliest convenience so that there will not be a delay with your future refills.          Thank you,   Your [facility] Team/  [phone]

## 2017-12-21 NOTE — TELEPHONE ENCOUNTER
Medication is being filled for 1 time refill only due to:  Patient needs to be seen for fasting lab appointment and appointment with the provider for further refills.  Farida Silva RN

## 2018-01-23 ENCOUNTER — TELEPHONE (OUTPATIENT)
Dept: FAMILY MEDICINE | Facility: CLINIC | Age: 73
End: 2018-01-23

## 2018-01-23 NOTE — TELEPHONE ENCOUNTER
Nurse Yanet with [employer] calling to ask if  will follow this patient for home health care and for home health care orders. Please call to discuss. Thank you

## 2018-01-24 ENCOUNTER — TELEPHONE (OUTPATIENT)
Dept: FAMILY MEDICINE | Facility: CLINIC | Age: 73
End: 2018-01-24

## 2018-01-24 NOTE — TELEPHONE ENCOUNTER
Left message on answering machine for patient to call back to [phone].  Farida Silva RN

## 2018-01-24 NOTE — TELEPHONE ENCOUNTER
See message from yesterday.  Left message I had advised Yanet that Dr. Dotson said:        Patient no show today. I'd prefer internal med or if they is a md then normally does the home health follow-up to take over but if not I'll do but still needs md appointment then. David Dotson MD       Repeated that pt should make appointment with internal medication provider for orders or make appointment with Dr. Dotson.  Farida Silva RN

## 2018-01-24 NOTE — TELEPHONE ENCOUNTER
Skilled nursing orders needed.  Please call.  New to oxygen and nurse would like to get these orders ASAP before seeing patient.

## 2018-01-24 NOTE — TELEPHONE ENCOUNTER
Patient no show today. I'd prefer internal med or if they is a md then normally does the home health follow-up to take over but if not I'll do but still needs md appointment then. David Dotson MD

## 2018-02-11 DIAGNOSIS — I10 ESSENTIAL HYPERTENSION WITH GOAL BLOOD PRESSURE LESS THAN 140/90: ICD-10-CM

## 2018-02-11 DIAGNOSIS — J45.41 MODERATE PERSISTENT ASTHMA WITH ACUTE EXACERBATION: ICD-10-CM

## 2018-02-11 DIAGNOSIS — I50.40 CHF (CONGESTIVE HEART FAILURE), NYHA CLASS I, UNSPECIFIED FAILURE CHRONICITY, COMBINED (H): ICD-10-CM

## 2018-02-11 NOTE — LETTER
February 12, 2018    Catherine Ferreira  [address] [facility] [address]    Dear Catherine,       We recently received a refill request for furosemide (LASIX) 20 MG tablet.  We have refilled this for a one time 30 day supply only because you are due for a:    Blood pressure office visit with provider and fasting lab appointment      Please schedule this lab appointment 4-5 days prior to the office visit.     Please call at your earliest convenience so that there will not be a delay with your future refills.          Thank you,   Your [facility] Team/  [phone]

## 2018-02-12 ENCOUNTER — TELEPHONE (OUTPATIENT)
Dept: FAMILY MEDICINE | Facility: CLINIC | Age: 73
End: 2018-02-12

## 2018-02-12 ENCOUNTER — MEDICAL CORRESPONDENCE (OUTPATIENT)
Dept: HEALTH INFORMATION MANAGEMENT | Facility: CLINIC | Age: 73
End: 2018-02-12

## 2018-02-12 RX ORDER — FUROSEMIDE 20 MG
TABLET ORAL
Qty: 60 TABLET | Refills: 0 | Status: SHIPPED | OUTPATIENT
Start: 2018-02-12 | End: 2018-03-01

## 2018-02-12 RX ORDER — ALBUTEROL SULFATE 0.83 MG/ML
SOLUTION RESPIRATORY (INHALATION)
Qty: 75 ML | Refills: 0 | Status: SHIPPED | OUTPATIENT
Start: 2018-02-12 | End: 2018-09-25

## 2018-02-12 NOTE — LETTER
[facility]  [address] [facility] [address]  Phone: [phone]      Catherine Ferreira  [address] [address]          February 12, 2018          DearCatherine Ferreira      Our records indicate that you have not scheduled for a(n)Diabetic check  which was recommended by your health care team. Monitoring and managing your preventative and chronic health conditions are very important to us.       If you have received your health care elsewhere, please provide us with that information so it can be documented in your chart.    Please call [phone] or message us through your [employer] account to schedule an appointment or provide information for your chart.     We look forward to seeing you and working with you on your health care needs.     Sincerely,   David Dotson MD          *If you have already scheduled an appointment, please disregard this reminder

## 2018-02-12 NOTE — TELEPHONE ENCOUNTER
Panel Management Review      Patient has the following on her problem list:     Diabetes    ASA: Passed    Last A1C  Lab Results   Component Value Date    A1C 9.2 06/05/2017    A1C 8.8 11/16/2016    A1C 7.3 05/17/2016    A1C 7.0 10/02/2015    A1C 7.1 01/15/2015     A1C tested: FAILED    Last LDL:    Lab Results   Component Value Date    CHOL 198 11/16/2016     Lab Results   Component Value Date    HDL 45 11/16/2016     Lab Results   Component Value Date     11/16/2016     Lab Results   Component Value Date    TRIG 122 11/16/2016     Lab Results   Component Value Date    CHOLHDLRATIO 4.9 06/02/2014     Lab Results   Component Value Date    NHDL 153 11/16/2016       Is the patient on a Statin? YES             Is the patient on Aspirin? YES    Medications     HMG CoA Reductase Inhibitors    atorvastatin (LIPITOR) 40 MG tablet    Salicylates    aspirin 81 MG tablet          Last three blood pressure readings:  BP Readings from Last 3 Encounters:   10/06/17 139/78   09/27/17 142/79   06/05/17 154/82       Date of last diabetes office visit: 06/05/2017     Tobacco History:     History   Smoking Status     Never Smoker   Smokeless Tobacco     Never Used           Composite cancer screening  Chart review shows that this patient is due/due soon for the following None  Summary:    Patient is due/failing the following:   A1C and LDL    Action needed:   Patient needs office visit for diabetes check .    Type of outreach:    Sent letter.    Questions for provider review:    None                                                                                                                                    Lakeisha Sher M.A.       Chart routed to n/a .

## 2018-02-15 ENCOUNTER — TELEPHONE (OUTPATIENT)
Dept: FAMILY MEDICINE | Facility: CLINIC | Age: 73
End: 2018-02-15

## 2018-02-15 DIAGNOSIS — N18.6 TYPE 2 DIABETES MELLITUS WITH CHRONIC KIDNEY DISEASE ON CHRONIC DIALYSIS, UNSPECIFIED LONG TERM INSULIN USE STATUS: Primary | ICD-10-CM

## 2018-02-15 DIAGNOSIS — Z99.2 TYPE 2 DIABETES MELLITUS WITH CHRONIC KIDNEY DISEASE ON CHRONIC DIALYSIS, UNSPECIFIED LONG TERM INSULIN USE STATUS: Primary | ICD-10-CM

## 2018-02-15 DIAGNOSIS — E11.22 TYPE 2 DIABETES MELLITUS WITH CHRONIC KIDNEY DISEASE ON CHRONIC DIALYSIS, UNSPECIFIED LONG TERM INSULIN USE STATUS: Primary | ICD-10-CM

## 2018-02-15 DIAGNOSIS — I10 ESSENTIAL HYPERTENSION WITH GOAL BLOOD PRESSURE LESS THAN 140/90: ICD-10-CM

## 2018-02-15 DIAGNOSIS — N18.1 TYPE 2 DIABETES MELLITUS WITH STAGE 1 CHRONIC KIDNEY DISEASE, WITHOUT LONG-TERM CURRENT USE OF INSULIN (H): ICD-10-CM

## 2018-02-15 DIAGNOSIS — E78.5 HYPERLIPIDEMIA WITH TARGET LDL LESS THAN 100: ICD-10-CM

## 2018-02-15 DIAGNOSIS — E11.22 TYPE 2 DIABETES MELLITUS WITH STAGE 1 CHRONIC KIDNEY DISEASE, WITHOUT LONG-TERM CURRENT USE OF INSULIN (H): ICD-10-CM

## 2018-02-15 NOTE — TELEPHONE ENCOUNTER
Called and spoke to patient, I told her that I do not see that anyone called her, but a letter was mailed that an appointment is needed. Lab and provider appointment made.    Please review lab orders sign and close encounter. Tamia Butt MA/RAYSHAWN    Diabetes appt 3/23/18

## 2018-02-15 NOTE — TELEPHONE ENCOUNTER
Patient is calling stated that someone had called her on Monday.  Please call to advise  Thank you

## 2018-02-16 RX ORDER — METFORMIN HCL 500 MG
TABLET, EXTENDED RELEASE 24 HR ORAL
Qty: 60 TABLET | Refills: 0 | Status: SHIPPED | OUTPATIENT
Start: 2018-02-16 | End: 2018-03-01

## 2018-02-16 NOTE — TELEPHONE ENCOUNTER
Medication is being filled for 1 time refill only due to:  pt has lab appt 2/20 and provider appt 2/23     Mattie PRINCEN, RN, CPN

## 2018-02-21 ENCOUNTER — DOCUMENTATION ONLY (OUTPATIENT)
Dept: LAB | Facility: CLINIC | Age: 73
End: 2018-02-21

## 2018-02-21 DIAGNOSIS — N18.6 TYPE 2 DIABETES MELLITUS WITH CHRONIC KIDNEY DISEASE ON CHRONIC DIALYSIS, UNSPECIFIED LONG TERM INSULIN USE STATUS: ICD-10-CM

## 2018-02-21 DIAGNOSIS — E11.22 TYPE 2 DIABETES MELLITUS WITH CHRONIC KIDNEY DISEASE, WITHOUT LONG-TERM CURRENT USE OF INSULIN, UNSPECIFIED CKD STAGE (H): ICD-10-CM

## 2018-02-21 DIAGNOSIS — I10 HYPERTENSION GOAL BP (BLOOD PRESSURE) < 140/90: ICD-10-CM

## 2018-02-21 DIAGNOSIS — Z99.2 TYPE 2 DIABETES MELLITUS WITH CHRONIC KIDNEY DISEASE ON CHRONIC DIALYSIS, UNSPECIFIED LONG TERM INSULIN USE STATUS: ICD-10-CM

## 2018-02-21 DIAGNOSIS — E11.22 TYPE 2 DIABETES MELLITUS WITH CHRONIC KIDNEY DISEASE ON CHRONIC DIALYSIS, UNSPECIFIED LONG TERM INSULIN USE STATUS: ICD-10-CM

## 2018-02-21 DIAGNOSIS — E78.5 HYPERLIPIDEMIA WITH TARGET LDL LESS THAN 100: Primary | ICD-10-CM

## 2018-02-21 NOTE — PROGRESS NOTES
.Please place or confirm pending lab orders for upcoming lab appointment on 02/23/018.  ThanksTiffanie

## 2018-02-23 ENCOUNTER — TELEPHONE (OUTPATIENT)
Dept: FAMILY MEDICINE | Facility: CLINIC | Age: 73
End: 2018-02-23

## 2018-02-23 NOTE — TELEPHONE ENCOUNTER
Can you call her and see if she can get her labs drawn before her appt with Dr. Dotson.    Farida Morley, CMA

## 2018-02-26 DIAGNOSIS — I10 HYPERTENSION GOAL BP (BLOOD PRESSURE) < 140/90: ICD-10-CM

## 2018-02-26 DIAGNOSIS — E11.22 TYPE 2 DIABETES MELLITUS WITH CHRONIC KIDNEY DISEASE ON CHRONIC DIALYSIS, UNSPECIFIED LONG TERM INSULIN USE STATUS: ICD-10-CM

## 2018-02-26 DIAGNOSIS — N18.6 TYPE 2 DIABETES MELLITUS WITH CHRONIC KIDNEY DISEASE ON CHRONIC DIALYSIS, UNSPECIFIED LONG TERM INSULIN USE STATUS: ICD-10-CM

## 2018-02-26 DIAGNOSIS — Z99.2 TYPE 2 DIABETES MELLITUS WITH CHRONIC KIDNEY DISEASE ON CHRONIC DIALYSIS, UNSPECIFIED LONG TERM INSULIN USE STATUS: ICD-10-CM

## 2018-02-26 DIAGNOSIS — E78.5 HYPERLIPIDEMIA WITH TARGET LDL LESS THAN 100: ICD-10-CM

## 2018-02-26 LAB
ALBUMIN SERPL-MCNC: 3 G/DL (ref 3.4–5)
ANION GAP SERPL CALCULATED.3IONS-SCNC: 10 MMOL/L (ref 3–14)
BUN SERPL-MCNC: 21 MG/DL (ref 7–30)
CALCIUM SERPL-MCNC: 9.1 MG/DL (ref 8.5–10.1)
CHLORIDE SERPL-SCNC: 103 MMOL/L (ref 94–109)
CHOLEST SERPL-MCNC: 232 MG/DL
CO2 SERPL-SCNC: 28 MMOL/L (ref 20–32)
CREAT SERPL-MCNC: 1.25 MG/DL (ref 0.52–1.04)
GFR SERPL CREATININE-BSD FRML MDRD: 42 ML/MIN/1.7M2
GLUCOSE SERPL-MCNC: 185 MG/DL (ref 70–99)
HBA1C MFR BLD: 7.6 % (ref 4.3–6)
HDLC SERPL-MCNC: 51 MG/DL
LDLC SERPL CALC-MCNC: 153 MG/DL
NONHDLC SERPL-MCNC: 181 MG/DL
PHOSPHATE SERPL-MCNC: 3.3 MG/DL (ref 2.5–4.5)
POTASSIUM SERPL-SCNC: 4.2 MMOL/L (ref 3.4–5.3)
SODIUM SERPL-SCNC: 141 MMOL/L (ref 133–144)
TRIGL SERPL-MCNC: 138 MG/DL

## 2018-02-26 PROCEDURE — 83036 HEMOGLOBIN GLYCOSYLATED A1C: CPT | Performed by: FAMILY MEDICINE

## 2018-02-26 PROCEDURE — 80069 RENAL FUNCTION PANEL: CPT | Performed by: FAMILY MEDICINE

## 2018-02-26 PROCEDURE — 36415 COLL VENOUS BLD VENIPUNCTURE: CPT | Performed by: FAMILY MEDICINE

## 2018-02-26 PROCEDURE — 80061 LIPID PANEL: CPT | Performed by: FAMILY MEDICINE

## 2018-02-26 RX ORDER — GLIPIZIDE 5 MG/1
TABLET, FILM COATED, EXTENDED RELEASE ORAL
Qty: 90 TABLET | Refills: 0 | Status: CANCELLED | OUTPATIENT
Start: 2018-02-26

## 2018-03-01 ENCOUNTER — OFFICE VISIT (OUTPATIENT)
Dept: FAMILY MEDICINE | Facility: CLINIC | Age: 73
End: 2018-03-01
Payer: COMMERCIAL

## 2018-03-01 VITALS
WEIGHT: 293 LBS | BODY MASS INDEX: 51.91 KG/M2 | RESPIRATION RATE: 20 BRPM | TEMPERATURE: 97.8 F | OXYGEN SATURATION: 92 % | HEART RATE: 97 BPM | DIASTOLIC BLOOD PRESSURE: 64 MMHG | HEIGHT: 63 IN | SYSTOLIC BLOOD PRESSURE: 124 MMHG

## 2018-03-01 DIAGNOSIS — K21.00 GASTROESOPHAGEAL REFLUX DISEASE WITH ESOPHAGITIS: ICD-10-CM

## 2018-03-01 DIAGNOSIS — N18.1 TYPE 2 DIABETES MELLITUS WITH STAGE 1 CHRONIC KIDNEY DISEASE, WITHOUT LONG-TERM CURRENT USE OF INSULIN (H): ICD-10-CM

## 2018-03-01 DIAGNOSIS — J45.40 MODERATE PERSISTENT ASTHMA WITHOUT COMPLICATION: ICD-10-CM

## 2018-03-01 DIAGNOSIS — K21.9 GASTROESOPHAGEAL REFLUX DISEASE WITHOUT ESOPHAGITIS: ICD-10-CM

## 2018-03-01 DIAGNOSIS — F41.1 GAD (GENERALIZED ANXIETY DISORDER): ICD-10-CM

## 2018-03-01 DIAGNOSIS — R06.83 SNORING: ICD-10-CM

## 2018-03-01 DIAGNOSIS — N18.6 TYPE 2 DIABETES MELLITUS WITH CHRONIC KIDNEY DISEASE ON CHRONIC DIALYSIS, UNSPECIFIED LONG TERM INSULIN USE STATUS: Primary | ICD-10-CM

## 2018-03-01 DIAGNOSIS — Z99.2 TYPE 2 DIABETES MELLITUS WITH CHRONIC KIDNEY DISEASE ON CHRONIC DIALYSIS, UNSPECIFIED LONG TERM INSULIN USE STATUS: Primary | ICD-10-CM

## 2018-03-01 DIAGNOSIS — E11.22 TYPE 2 DIABETES MELLITUS WITH STAGE 1 CHRONIC KIDNEY DISEASE, WITHOUT LONG-TERM CURRENT USE OF INSULIN (H): ICD-10-CM

## 2018-03-01 DIAGNOSIS — I50.40 CHF (CONGESTIVE HEART FAILURE), NYHA CLASS I, UNSPECIFIED FAILURE CHRONICITY, COMBINED (H): ICD-10-CM

## 2018-03-01 DIAGNOSIS — I10 ESSENTIAL HYPERTENSION WITH GOAL BLOOD PRESSURE LESS THAN 140/90: ICD-10-CM

## 2018-03-01 DIAGNOSIS — F33.1 MAJOR DEPRESSIVE DISORDER, RECURRENT EPISODE, MODERATE (H): ICD-10-CM

## 2018-03-01 DIAGNOSIS — E78.5 HYPERLIPIDEMIA WITH TARGET LDL LESS THAN 100: ICD-10-CM

## 2018-03-01 DIAGNOSIS — E11.22 TYPE 2 DIABETES MELLITUS WITH CHRONIC KIDNEY DISEASE ON CHRONIC DIALYSIS, UNSPECIFIED LONG TERM INSULIN USE STATUS: Primary | ICD-10-CM

## 2018-03-01 PROCEDURE — 99214 OFFICE O/P EST MOD 30 MIN: CPT | Performed by: FAMILY MEDICINE

## 2018-03-01 RX ORDER — PAROXETINE 20 MG/1
20 TABLET, FILM COATED ORAL AT BEDTIME
Qty: 90 TABLET | Refills: 1 | Status: SHIPPED | OUTPATIENT
Start: 2018-03-01 | End: 2018-09-25

## 2018-03-01 RX ORDER — AMLODIPINE BESYLATE 5 MG/1
5 TABLET ORAL DAILY
Qty: 90 TABLET | Refills: 1 | Status: SHIPPED | OUTPATIENT
Start: 2018-03-01 | End: 2018-06-09

## 2018-03-01 RX ORDER — METFORMIN HCL 500 MG
TABLET, EXTENDED RELEASE 24 HR ORAL
Qty: 180 TABLET | Refills: 3 | Status: SHIPPED | OUTPATIENT
Start: 2018-03-01 | End: 2019-06-22

## 2018-03-01 RX ORDER — LISINOPRIL 20 MG/1
20 TABLET ORAL 2 TIMES DAILY
Qty: 180 TABLET | Refills: 1 | Status: SHIPPED | OUTPATIENT
Start: 2018-03-01 | End: 2018-09-25

## 2018-03-01 RX ORDER — ATORVASTATIN CALCIUM 80 MG/1
80 TABLET, FILM COATED ORAL DAILY
Qty: 90 TABLET | Refills: 3 | Status: SHIPPED | OUTPATIENT
Start: 2018-03-01 | End: 2019-02-19

## 2018-03-01 RX ORDER — BUPROPION HYDROCHLORIDE 150 MG/1
150 TABLET, EXTENDED RELEASE ORAL
Qty: 90 TABLET | Refills: 1 | Status: SHIPPED | OUTPATIENT
Start: 2018-03-01 | End: 2018-09-25

## 2018-03-01 RX ORDER — GLIPIZIDE 5 MG/1
TABLET, FILM COATED, EXTENDED RELEASE ORAL
Qty: 90 TABLET | Refills: 1 | Status: SHIPPED | OUTPATIENT
Start: 2018-03-01 | End: 2018-03-20

## 2018-03-01 RX ORDER — MONTELUKAST SODIUM 10 MG/1
1 TABLET ORAL DAILY
Qty: 90 TABLET | Refills: 3 | Status: SHIPPED | OUTPATIENT
Start: 2018-03-01 | End: 2018-09-25

## 2018-03-01 RX ORDER — FUROSEMIDE 20 MG
TABLET ORAL
Qty: 180 TABLET | Refills: 1 | Status: SHIPPED | OUTPATIENT
Start: 2018-03-01 | End: 2018-05-01

## 2018-03-01 RX ORDER — PANTOPRAZOLE SODIUM 40 MG/1
40 TABLET, DELAYED RELEASE ORAL DAILY
Qty: 90 TABLET | Refills: 1 | Status: SHIPPED | OUTPATIENT
Start: 2018-03-01 | End: 2018-09-25

## 2018-03-01 RX ORDER — LISINOPRIL 20 MG/1
TABLET ORAL
Qty: 180 TABLET | Refills: 1 | Status: SHIPPED | OUTPATIENT
Start: 2018-03-01 | End: 2018-09-25

## 2018-03-01 NOTE — MR AVS SNAPSHOT
After Visit Summary   3/1/2018    Catherine Ferreira    MRN: [id number]           Patient Information     Date Of Birth          1945        Visit Information        Provider Department      3/1/2018 11:40 AM David Dotson MD [facility]        Today's Diagnoses     Type 2 diabetes mellitus with chronic kidney disease on chronic dialysis, unspecified long term insulin use status (H)    -  1    Moderate persistent asthma without complication        Gastroesophageal reflux disease with esophagitis        TEX (generalized anxiety disorder)        Essential hypertension with goal blood pressure less than 140/90        CHF (congestive heart failure), NYHA class I, unspecified failure chronicity, combined (H)        Hyperlipidemia with target LDL less than 100        Gastroesophageal reflux disease without esophagitis        Type 2 diabetes mellitus with stage 1 chronic kidney disease, without long-term current use of insulin (H)        Major depressive disorder, recurrent episode, moderate (H)        Snoring        BMI 50.0-59.9, adult (H)           Follow-ups after your visit        Additional Services     SLEEP EVALUATION & MANAGEMENT REFERRAL - [facility] [place]  [phone] (Age 15 and up)       Please be aware that coverage of these services is subject to the terms and limitations of your health insurance plan.  Call member services at your health plan with any benefit or coverage questions.      Please bring the following to your appointment:    >>   List of current medications   >>   This referral request   >>   Any documents/labs given to you for this referral                      Future tests that were ordered for you today     Open Future Orders        Priority Expected Expires Ordered    SLEEP EVALUATION & MANAGEMENT REFERRAL - [facility] [place]  [phone] (Age 15 and up) Routine  3/1/2019 3/1/2018            Who to  "contact     If you have questions or need follow up information about today's clinic visit or your schedule please contact [facility] directly at [phone].  Normal or non-critical lab and imaging results will be communicated to you by MyChart, letter or phone within 4 business days after the clinic has received the results. If you do not hear from us within 7 days, please contact the clinic through MyChart or phone. If you have a critical or abnormal lab result, we will notify you by phone as soon as possible.  Submit refill requests through [employer] or call your pharmacy and they will forward the refill request to us. Please allow 3 business days for your refill to be completed.          Additional Information About Your Visit        [employer] Information     [employer] lets you send messages to your doctor, view your test results, renew your prescriptions, schedule appointments and more. To sign up, go to [email or web address] . Click on \"Log in\" on the left side of the screen, which will take you to the Welcome page. Then click on \"Sign up Now\" on the right side of the page.     You will be asked to enter the access code listed below, as well as some personal information. Please follow the directions to create your username and password.     Your access code is: [email or web address]  Expires: 2018  7:42 AM     Your access code will  in 90 days. If you need help or a new code, please call your [facility] clinic or [phone].        Care EveryWhere ID     This is your Care EveryWhere ID. This could be used by other organizations to access your [facility] medical records  [id number]        Your Vitals Were     Pulse Temperature Respirations Height Pulse Oximetry BMI (Body Mass Index)    97 97.8  F (36.6  C) (Oral) 20 5' 3\" (1.6 m) 92% 55.09 kg/m2       Blood Pressure from Last 3 Encounters:   18 124/64   10/06/17 139/78   17 142/79    Weight from Last 3 Encounters:   18 (!) 311 lb " (141.1 kg)   06/05/17 (!) 319 lb (144.7 kg)   11/22/16 (!) 326 lb (147.9 kg)                 Today's Medication Changes          These changes are accurate as of 3/1/18 12:23 PM.  If you have any questions, ask your nurse or doctor.               These medicines have changed or have updated prescriptions.        Dose/Directions    * atorvastatin 40 MG tablet   Commonly known as:  LIPITOR   This may have changed:  Another medication with the same name was added. Make sure you understand how and when to take each.   Used for:  Hyperlipidemia with target LDL less than 100   Changed by:  David Dotson MD        Dose:  40 mg   Take 1 tablet (40 mg) by mouth daily At bedtime for cholesterol Pharmacy ok to hold prescription until due   Quantity:  90 tablet   Refills:  1       * atorvastatin 80 MG tablet   Commonly known as:  LIPITOR   This may have changed:  You were already taking a medication with the same name, and this prescription was added. Make sure you understand how and when to take each.   Used for:  Hyperlipidemia with target LDL less than 100   Changed by:  David Dotson MD        Dose:  80 mg   Take 1 tablet (80 mg) by mouth daily For cholesterol this is double dosage from before   Quantity:  90 tablet   Refills:  3       furosemide 20 MG tablet   Commonly known as:  LASIX   This may have changed:  See the new instructions.   Used for:  Essential hypertension with goal blood pressure less than 140/90, CHF (congestive heart failure), NYHA class I, unspecified failure chronicity, combined (H)   Changed by:  David Dotson MD        TAKE 2 TABLETS BY MOUTH DAILY WITH BREAKFAST FOR BLOOD PRESSURE   Quantity:  180 tablet   Refills:  1       glipiZIDE 5 MG 24 hr tablet   Commonly known as:  GLUCOTROL XL   This may have changed:  See the new instructions.   Used for:  Type 2 diabetes mellitus with chronic kidney disease on chronic dialysis, unspecified long term insulin use status (H)   Changed by:   David Dotson MD        TAKE 1 TABLET BY MOUTH EVERY DAY WITH BREAKFAST for diabetes   Quantity:  90 tablet   Refills:  1       * lisinopril 20 MG tablet   Commonly known as:  PRINIVIL/ZESTRIL   This may have changed:  Another medication with the same name was changed. Make sure you understand how and when to take each.   Used for:  Essential hypertension with goal blood pressure less than 140/90   Changed by:  David Dotson MD        Dose:  20 mg   Take 1 tablet (20 mg) by mouth 2 times daily For blood pressure Pharmacy ok to hold prescription until due   Quantity:  180 tablet   Refills:  1       * lisinopril 20 MG tablet   Commonly known as:  PRINIVIL/ZESTRIL   This may have changed:  See the new instructions.   Used for:  Essential hypertension with goal blood pressure less than 140/90   Changed by:  David Dotson MD        TAKE 1 TABLET BY MOUTH 2 TIMES DAILY FOR BLOOD PRESSURE   Quantity:  180 tablet   Refills:  1       metFORMIN 500 MG 24 hr tablet   Commonly known as:  GLUCOPHAGE-XR   This may have changed:  See the new instructions.   Used for:  Type 2 diabetes mellitus with stage 1 chronic kidney disease, without long-term current use of insulin (H)   Changed by:  David Dotson MD        TAKE 1 TABLET(500 MG) BY MOUTH TWICE DAILY WITH MEALS for diabetes   Quantity:  180 tablet   Refills:  3       PARoxetine 20 MG tablet   Commonly known as:  PAXIL   This may have changed:  additional instructions   Used for:  TEX (generalized anxiety disorder)   Changed by:  David Dotson MD        Dose:  20 mg   Take 1 tablet (20 mg) by mouth At Bedtime For anxiety   Quantity:  90 tablet   Refills:  1       * Notice:  This list has 4 medication(s) that are the same as other medications prescribed for you. Read the directions carefully, and ask your doctor or other care provider to review them with you.      Stop taking these medicines if you haven't already. Please contact your care team if you  have questions.     cyclobenzaprine 5 MG tablet   Commonly known as:  FLEXERIL   Stopped by:  David Dotson MD           traMADol 50 MG tablet   Commonly known as:  ULTRAM   Stopped by:  David Dotson MD                Where to get your medicines      These medications were sent to [facility] [place], [place] - [address]  [address], [place] [address]     Phone:  [phone]     amLODIPine 5 MG tablet    atorvastatin 80 MG tablet    blood glucose monitoring test strip    buPROPion 150 MG 12 hr tablet    fluticasone-salmeterol 500-50 MCG/DOSE diskus inhaler    furosemide 20 MG tablet    glipiZIDE 5 MG 24 hr tablet    lisinopril 20 MG tablet    lisinopril 20 MG tablet    metFORMIN 500 MG 24 hr tablet    pantoprazole 40 MG EC tablet    PARoxetine 20 MG tablet                Primary Care Provider Office Phone # Fax #    David Dotson [phone] [address] [address] [address]        Equal Access to Services     [facility] : Hadii aad ku hadasho Soomaali, waaxda luqadaha, qaybta kaalmada adeegyada, waxay idiin hayaan adeeg eliud martell . So [facility] [phone].    ATENCIÓN: Si habla español, tiene a duggan disposición servicios gratuitos de asistencia lingüística. [facility] [phone].    We comply with applicable federal civil rights laws and [place] laws. We do not discriminate on the basis of race, color, national origin, age, disability, sex, sexual orientation, or gender identity.            Thank you!     Thank you for choosing [facility]  for your care. Our goal is always to provide you with excellent care. Hearing back from our patients is one way we can continue to improve our services. Please take a few minutes to complete the written survey that you may receive in the mail after your visit with us. Thank you!             Your Updated Medication List - Protect others around you: Learn how to safely use,  store and throw away your medicines at www.disposemymeds.org.          This list is accurate as of 3/1/18 12:23 PM.  Always use your most recent med list.                   Brand Name Dispense Instructions for use Diagnosis    * albuterol 108 (90 BASE) MCG/ACT Inhaler    VENTOLIN HFA    3 Inhaler    INHALE 2 PUFFS BY MOUTH FOUR TIMES DAILY AS NEEDED FOR WHEEZING    Moderate persistent asthma without complication       * albuterol (2.5 MG/3ML) 0.083% neb solution     75 mL    TAKE 1 VIAL BY NEBULIZATION EVERY 6 HOURS AS NEEDED FOR SHORTNESS OF BREATH, DYSPNEA OR WHEEZING    Moderate persistent asthma with acute exacerbation       amLODIPine 5 MG tablet    NORVASC    90 tablet    Take 1 tablet (5 mg) by mouth daily for blood pressure take at bedtime    Essential hypertension with goal blood pressure less than 140/90       aspirin 81 MG tablet      Take 1 tablet (81 mg) by mouth daily        * atorvastatin 40 MG tablet    LIPITOR    90 tablet    Take 1 tablet (40 mg) by mouth daily At bedtime for cholesterol Pharmacy ok to hold prescription until due    Hyperlipidemia with target LDL less than 100       * atorvastatin 80 MG tablet    LIPITOR    90 tablet    Take 1 tablet (80 mg) by mouth daily For cholesterol this is double dosage from before    Hyperlipidemia with target LDL less than 100       blood glucose monitoring meter device kit    no brand specified    1 kit    Use to test blood sugar 2 times daily or as directed. Dispense one touch or brand per insurance or pt preference.    Type 2 diabetes mellitus with diabetic chronic kidney disease (H), Type 2 diabetes, HbA1C goal < 8% (H)       blood glucose monitoring test strip    no brand specified    200 each    Use to test blood sugars 2 times daily dispense one touch or brand per insurance or pt preference.    Type 2 diabetes mellitus with chronic kidney disease on chronic dialysis, unspecified long term insulin use status (H)       buPROPion 150 MG 12 hr tablet     WELLBUTRIN SR    90 tablet    Take 1 tablet (150 mg) by mouth daily (before lunch) For depression/energy and weight loss    Major depressive disorder, recurrent episode, moderate (H)       CYANOCOBALAMIN PO      Take 1,000 mcg by mouth daily        fluconazole 150 MG tablet    DIFLUCAN    3 tablet    Take 1 pill every 3 days (3 doses) until vaginal symptoms are gone.    Candidiasis of vagina       fluticasone 50 MCG/ACT spray    FLONASE    16 g    Spray 2 sprays into both nostrils daily as needed for rhinitis or allergies    Chronic rhinitis       fluticasone-salmeterol 500-50 MCG/DOSE diskus inhaler    ADVAIR DISKUS    3 Inhaler    Inhale 1 puff into the lungs 2 times daily For asthma control.Pharmacy ok to hold prescription until due    Moderate persistent asthma without complication       furosemide 20 MG tablet    LASIX    180 tablet    TAKE 2 TABLETS BY MOUTH DAILY WITH BREAKFAST FOR BLOOD PRESSURE    Essential hypertension with goal blood pressure less than 140/90, CHF (congestive heart failure), NYHA class I, unspecified failure chronicity, combined (H)       gabapentin 300 MG capsule    NEURONTIN    30 capsule    TAKE 1 CAPSULE BY MOUTH EVERY NIGHT AT BEDTIME. MAY INCREASE TO 2 CAPSULES AT BEDTIME    Sciatica of right side       glipiZIDE 5 MG 24 hr tablet    GLUCOTROL XL    90 tablet    TAKE 1 TABLET BY MOUTH EVERY DAY WITH BREAKFAST for diabetes    Type 2 diabetes mellitus with chronic kidney disease on chronic dialysis, unspecified long term insulin use status (H)       ibuprofen 600 MG tablet    ADVIL/MOTRIN    60 tablet    Take 1 tablet (600 mg) by mouth every 6 hours as needed for moderate pain    Shingles       ipratropium - albuterol 0.5 mg/2.5 mg/3 mL 0.5-2.5 (3) MG/3ML neb solution    DUONEB    30 vial    Take 1 vial (3 mLs) by nebulization every 6 hours as needed for shortness of breath / dyspnea    SOB (shortness of breath)       LIFESCAN FINEPOINT LANCETS Misc     200 each    Use to test blood  sugars 2 times daily dispense one touch brand or brand per patient preference.    Type 2 diabetes mellitus with diabetic chronic kidney disease (H), Type 2 diabetes, HbA1C goal < 8% (H)       * lisinopril 20 MG tablet    PRINIVIL/ZESTRIL    180 tablet    Take 1 tablet (20 mg) by mouth 2 times daily For blood pressure Pharmacy ok to hold prescription until due    Essential hypertension with goal blood pressure less than 140/90       * lisinopril 20 MG tablet    PRINIVIL/ZESTRIL    180 tablet    TAKE 1 TABLET BY MOUTH 2 TIMES DAILY FOR BLOOD PRESSURE    Essential hypertension with goal blood pressure less than 140/90       MELATONIN PO      Take 5 mg by mouth nightly as needed        metFORMIN 500 MG 24 hr tablet    GLUCOPHAGE-XR    180 tablet    TAKE 1 TABLET(500 MG) BY MOUTH TWICE DAILY WITH MEALS for diabetes    Type 2 diabetes mellitus with stage 1 chronic kidney disease, without long-term current use of insulin (H)       METOPROLOL SUCCINATE ER PO      Take 25 mg by mouth daily        montelukast 10 MG tablet    SINGULAIR    90 tablet    Take 1 tablet (10 mg) by mouth daily For asthma/allergies Pharmacy ok to hold prescription until due    Chronic rhinitis, Moderate persistent asthma without complication       Multi-vitamin Tabs tablet      Take 1 tablet by mouth daily        nystatin 240028 UNIT/GM Powd    MYCOSTATIN    1 Bottle    Apply 1 g topically 4 times daily    Candidiasis of skin       OMEGA-3 FISH OIL PO      Take 2 g by mouth daily        pantoprazole 40 MG EC tablet    PROTONIX    90 tablet    Take 1 tablet (40 mg) by mouth daily For heartburn. Take 30-60 minutes before a meal.Pharmacy ok to hold prescription until due    Gastroesophageal reflux disease without esophagitis       PARoxetine 20 MG tablet    PAXIL    90 tablet    Take 1 tablet (20 mg) by mouth At Bedtime For anxiety    TEX (generalized anxiety disorder)       polyethylene glycol powder    MIRALAX    510 g    Take 17 g by mouth daily as  needed for constipation    Constipation       * Notice:  This list has 6 medication(s) that are the same as other medications prescribed for you. Read the directions carefully, and ask your doctor or other care provider to review them with you.

## 2018-03-01 NOTE — PROGRESS NOTES
SUBJECTIVE:   Catherine Ferreira is a 72 year old female who presents to clinic today for the following health issues:    Blood sugars improving with meals from [employer]. On lipitor. Lipids high. Needs more exercise.   Asthma stable now. No chest pain. No nausea, vomiting or diarrhea. No black or bloody stools. Emotionally doing better. More social interaction. 3 children in [place] - involved.  No urine changes or hematuria.   Emotionally doing a lot better. Leg swelling improving. No dysphagia. No SUICIAL IDEATION OR HOMOCIDAL IDEATION OR WAYNE.  Diabetes Follow-up    Patient is checking blood sugars: once daily.  Results are as follows:         am - 200's    Diabetic concerns: None     Symptoms of hypoglycemia (low blood sugar): none     Paresthesias (numbness or burning in feet) or sores: No     Date of last diabetic eye exam: 8/17    Hyperlipidemia Follow-Up      Rate your low fat/cholesterol diet?: fair    Taking statin?  Yes, no muscle aches from statin    Other lipid medications/supplements?:  none    Hypertension Follow-up      Outpatient blood pressures are being checked at home.  Results are 140-150/72.    Low Salt Diet: no added salt    BP Readings from Last 2 Encounters:   10/06/17 139/78   09/27/17 142/79     Hemoglobin A1C (%)   Date Value   02/26/2018 7.6 (H)   06/05/2017 9.2 (H)     LDL Cholesterol Calculated (mg/dL)   Date Value   02/26/2018 153 (H)   11/16/2016 129 (H)     Heart Failure Follow-up    Symptoms:    Shortness of breath: Yes    Lower extremity edema: none    Chest pain: No    Using more pillows than normal: Yes-  She has a bed that is automated    Cough at night: No    Weight:    Checking weight daily: No    Weight change: none    Cardiology visits, ER/UC, or hospital admissions since last visit:Hospitalizations - 1/30/18 for breathing    Medication side effects: none    Asthma Follow-Up    Was ACT completed today?    Yes    ACT Total Scores 6/30/2017   ACT TOTAL SCORE -  "  ASTHMA ER VISITS -   ASTHMA HOSPITALIZATIONS -   ACT TOTAL SCORE (Goal Greater than or Equal to 20) 15   In the past 12 months, how many times did you visit the emergency room for your asthma without being admitted to the hospital? 1   In the past 12 months, how many times were you hospitalized overnight because of your asthma? 0       Recent asthma triggers that patient is dealing with: cold air        Amount of exercise or physical activity: daily activities    Problems taking medications regularly: No    Medication side effects: none    Diet: low salt and low fat/cholesterol          Past Medical History:   Diagnosis Date     Asthma      Cataract      GERD (gastroesophageal reflux disease)      Glaucoma      Hiatal hernia      hx of ITP      Hyperlipidemia      Hypertension      Obesity      Osteoarthritis        Past Surgical History:   Procedure Laterality Date     APPENDECTOMY       CARDIAC SURGERY  2008    Pacemaker     CARDIAC SURGERY  08/07/16    pacemaker replacement     GYN SURGERY  1990    Hysterectomy. Age 40 yrs.      HC REMOVAL GALLBLADDER         Family History   Problem Relation Age of Onset     CANCER Mother      CANCER Maternal Grandmother      HEART DISEASE Maternal Grandmother      CANCER Maternal Grandfather      HEART DISEASE Paternal Grandmother        Social History   Substance Use Topics     Smoking status: Never Smoker     Smokeless tobacco: Never Used     Alcohol use Yes      Comment: monthly, one beer       ROS:  All other ROS negative    OBJECTIVE:  /64  Pulse 97  Temp 97.8  F (36.6  C) (Oral)  Resp 20  Ht 5' 3\" (1.6 m)  Wt (!) 311 lb (141.1 kg)  SpO2 92%  BMI 55.09 kg/m2  EXAM:  GENERAL APPEARANCE: healthy, alert and no distress  EYES: EOMI,  PERRL  HENT: ear canals and TM's normal and nose and mouth without ulcers or lesions  NECK: no adenopathy, no asymmetry, masses, or scars and thyroid normal to palpation  RESP: lungs clear to auscultation - no rales, rhonchi or " wheezes  CV: regular rates and rhythm, normal S1 S2, no S3 or S4 and no murmur, click or rub -  ABDOMEN:  soft, nontender, no HSM or masses and bowel sounds normal  MS: extremities normal- no gross deformities noted, no evidence of inflammation in joints, FROM in all extremities.  MS: bilateral LOWER EXTREMETIES edema +1  NEURO: Normal strength and tone, sensory exam grossly normal, mentation intact and speech normal  PSYCH: mentation appears normal and affect normal/bright    ASSESSMENT / PLAN:  (E11.22,  N18.6,  Z99.2) Type 2 diabetes mellitus with chronic kidney disease on chronic dialysis, unspecified long term insulin use status (H)  (primary encounter diagnosis)  Comment: greatly improved  Plan: glipiZIDE (GLUCOTROL XL) 5 MG 24 hr tablet,         blood glucose monitoring (NO BRAND SPECIFIED)         test strip        Continue diet/exercise and weight loss. Recheck in 6 months      (J45.40) Moderate persistent asthma without complication  Comment: stable/imprving  Plan: fluticasone-salmeterol (ADVAIR DISKUS) 500-50         MCG/DOSE diskus inhaler        Continue mdi's. Back to allergist if worse. Worsening shortness of breath to er    (K21.0) Gastroesophageal reflux disease with esophagitis  Comment: stable  Plan: prn protonix. Weight loss    (F41.1) TEX (generalized anxiety disorder)  Comment: stable/improving  Plan: PARoxetine (PAXIL) 20 MG tablet        Recheck in 6 months  Sooner if worse. If SUICIAL IDEATION OR HOMOCIDAL IDEATION OR WAYNE TO ER     (I10) Essential hypertension with goal blood pressure less than 140/90  Comment: stable  Plan: lisinopril (PRINIVIL/ZESTRIL) 20 MG tablet,         furosemide (LASIX) 20 MG tablet, lisinopril         (PRINIVIL/ZESTRIL) 20 MG tablet, amLODIPine         (NORVASC) 5 MG tablet        Continue meds    (I50.40) CHF (congestive heart failure), NYHA class I, unspecified failure chronicity, combined (H)  Comment: stable  Plan: furosemide (LASIX) 20 MG tablet         Continue daily weight checks. Recheck in 3 months for labs/etc.     (E78.5) Hyperlipidemia with target LDL less than 100  Comment: high  Plan: atorvastatin (LIPITOR) 80 MG tablet        Double lipitor. Recheck in 3 months  Reveiwed risks and side effects of medication  Weight loss    (F33.1) Major depressive disorder, recurrent episode, moderate (H)  Comment: stable  Plan: buPROPion (WELLBUTRIN SR) 150 MG 12 hr tablet            (R06.83) Snoring  Comment: possible peterson  Plan: SLEEP EVALUATION & MANAGEMENT REFERRAL - [facility] [place]          [phone] (Age 15 and up), SLEEP EVALUATION         & MANAGEMENT REFERRAL - [facility] - Numerous Locations - [phone],         [facility]- Numerous Locations -        [phone]        Sleep study. Weight loss    (Z68.43) BMI 50.0-59.9, adult (H)  Plan: SLEEP EVALUATION & MANAGEMENT REFERRAL - [facility] [place]          [phone] (Age 15 and up), SLEEP EVALUATION         & MANAGEMENT REFERRAL - [facility] - Numerous Locations - [phone],         [facility]- Numerous Locations -        [phone]        Start with sleep center. Diet/exercise and consider obesity specialist.       David Dotson

## 2018-03-02 ASSESSMENT — ASTHMA QUESTIONNAIRES: ACT_TOTALSCORE: 14

## 2018-03-16 ENCOUNTER — MEDICAL CORRESPONDENCE (OUTPATIENT)
Dept: HEALTH INFORMATION MANAGEMENT | Facility: CLINIC | Age: 73
End: 2018-03-16

## 2018-03-20 DIAGNOSIS — Z99.2 TYPE 2 DIABETES MELLITUS WITH CHRONIC KIDNEY DISEASE ON CHRONIC DIALYSIS, UNSPECIFIED LONG TERM INSULIN USE STATUS: ICD-10-CM

## 2018-03-20 DIAGNOSIS — N18.6 TYPE 2 DIABETES MELLITUS WITH CHRONIC KIDNEY DISEASE ON CHRONIC DIALYSIS, UNSPECIFIED LONG TERM INSULIN USE STATUS: ICD-10-CM

## 2018-03-20 DIAGNOSIS — E11.22 TYPE 2 DIABETES MELLITUS WITH CHRONIC KIDNEY DISEASE ON CHRONIC DIALYSIS, UNSPECIFIED LONG TERM INSULIN USE STATUS: ICD-10-CM

## 2018-03-21 RX ORDER — GLIPIZIDE 5 MG/1
TABLET, FILM COATED, EXTENDED RELEASE ORAL
Qty: 90 TABLET | Refills: 1 | Status: SHIPPED | OUTPATIENT
Start: 2018-03-21 | End: 2018-09-25

## 2018-03-30 ENCOUNTER — TRANSFERRED RECORDS (OUTPATIENT)
Dept: HEALTH INFORMATION MANAGEMENT | Facility: CLINIC | Age: 73
End: 2018-03-30

## 2018-04-17 ENCOUNTER — TELEPHONE (OUTPATIENT)
Dept: FAMILY MEDICINE | Facility: CLINIC | Age: 73
End: 2018-04-17

## 2018-04-17 NOTE — TELEPHONE ENCOUNTER
Patient states that she is so dizzy that she can't move her head without having the spinning dizzy sensation.  Onset a couple weeks ago.  She states that she had blew her nose real hard and could feel something in her left ear pop and then she felt draining  (says had had a sinus infection).  No ear pain.  Walking with walker for stability.  Appointment today with Dr. London.  Denisha King RN

## 2018-04-18 ENCOUNTER — OFFICE VISIT (OUTPATIENT)
Dept: FAMILY MEDICINE | Facility: CLINIC | Age: 73
End: 2018-04-18
Payer: COMMERCIAL

## 2018-04-18 VITALS
OXYGEN SATURATION: 95 % | SYSTOLIC BLOOD PRESSURE: 136 MMHG | DIASTOLIC BLOOD PRESSURE: 76 MMHG | TEMPERATURE: 97.8 F | HEART RATE: 85 BPM

## 2018-04-18 DIAGNOSIS — H81.10 BENIGN PAROXYSMAL POSITIONAL VERTIGO, UNSPECIFIED LATERALITY: Primary | ICD-10-CM

## 2018-04-18 DIAGNOSIS — R47.89 WORD FINDING DIFFICULTY: ICD-10-CM

## 2018-04-18 DIAGNOSIS — J32.9 SINUSITIS, UNSPECIFIED CHRONICITY, UNSPECIFIED LOCATION: ICD-10-CM

## 2018-04-18 PROCEDURE — 99215 OFFICE O/P EST HI 40 MIN: CPT | Performed by: FAMILY MEDICINE

## 2018-04-18 RX ORDER — AZITHROMYCIN 250 MG/1
TABLET, FILM COATED ORAL
Qty: 6 TABLET | Refills: 0 | Status: SHIPPED | OUTPATIENT
Start: 2018-04-18 | End: 2018-09-25

## 2018-04-18 RX ORDER — MECLIZINE HYDROCHLORIDE 25 MG/1
25 TABLET ORAL 2 TIMES DAILY PRN
Qty: 60 TABLET | Refills: 0 | Status: SHIPPED | OUTPATIENT
Start: 2018-04-18 | End: 2018-08-08

## 2018-04-18 NOTE — PATIENT INSTRUCTIONS
1. Try Meclizine for vertigo as needed. May cause dry mouth.    2. Set up therapy:    [facility]  [facility]   [address]   [place] [address]  Ph: [phone]   Fax: [phone]    3. Set up neurology appointment - [place] [phone].    4. If severe symptoms go to the ER or call 911.    5. Otherwise see your primary doctor for your routine follow-up.

## 2018-04-18 NOTE — NURSING NOTE
"Chief Complaint   Patient presents with     Dizziness       Initial /76 (Cuff Size: Adult Large)  Pulse 85  Temp 97.8  F (36.6  C) (Oral)  SpO2 95% Estimated body mass index is 55.09 kg/(m^2) as calculated from the following:    Height as of 3/1/18: 5' 3\" (1.6 m).    Weight as of 3/1/18: 311 lb (141.1 kg).      Kimberly Rodriguez LPLACHELLE    "

## 2018-04-18 NOTE — PROGRESS NOTES
HPI:    I am seeing Catherine Ferreira for the 1st time. She is a 72 year old female who presents for evaluation of:    Vertigo - present since 1st week in April. Symptoms are spinning or movement sensation without a feeling of near syncope. The symptoms are are worse when turing the head quickly to either side or when looking up. Feels better to keep the neck still. It is difficult to walk due to loss of balance. Has to grab walls or furniture to walk. Denies ear ringing or hearing loss. Denies headaches, numbness, focal weakness. Has had some difficulty with word finding when she speaks. Denies double vision or other neurological symptoms. No nausea or vomiting.  No chest pain, shortness of breath or palpitations.  Evaluation and treatment:    Discussed the diff dx including labyrinthitis, BPV, Menier's, vertebrobasilar insufficiency, acoustic neuroma, cerebellar CVA or other cerebellar dysfunction.    At this time I favor BPV. I am referring her to  - [facility].   Sinus infection may also make things worse - see below   Meclizine may help symptoms. Side effects discussed.   If symptoms persist or get worse, we would have to look at the other possibilities.    Word finding difficulty - she says as she is speaking, she gets stuck because she can't find the words to express herself. This is in addition to vertigo as above.  Evaluation and treatment:   I am referring her to neurology.     Sinusitis - for the last 2 weeks she has had sinus and nasal congestion and pressure. She has had ear fullness. Mild cough without fevers or shortness of breath.  Evaluation and treatment:    She is allergic to PCN   We will treat with Zithromax.    ROS:    Const: No fevers, weight changes or night sweats recently.  ENT: No sore throat, hearing problems.  Resp: No shortness of breath.  CV: No chest pain, dizziness or cardiac palpitations.  GI: No nausea, vomitting, diarrhea or constipation. Denies blood in stools or  black stools.  : No dysuria, frequency or hematuria.  The rest of the ROS negative except as noted in HPI above.    Social History     Social History     Marital status:      Spouse name: N/A     Number of children: 3     Years of education: 12+     Occupational History     Banker      2006     Social History Main Topics     Smoking status: Never Smoker     Smokeless tobacco: Never Used     Alcohol use Yes      Comment: monthly, one beer     Drug use: No     Sexual activity: No      Comment: JAYLA     Other Topics Concern     Not on file     Social History Narrative         Exam:  /76 (Cuff Size: Adult Large)  Pulse 85  Temp 97.8  F (36.6  C) (Oral)  SpO2 95%  Gen: Elderly female in no acute distress. She is using the clinic wheelchair. Here with her daughter.  HENT: TM's normal. Oropharynx normal. Oral mucosa moist without lesions. Tender over the maxillary sinuses. Hearing intact to finger rub. Alexander-Hallpike maneuver not done today.    Eyes: Conjunctiva and sclera normal. Pupils react normally to light. Faint lateral nystagmus on both sides. Fundoscopic exam was difficult.    Neck: No enlarged lymph nodes, thyromegally or other masses.   Lungs: Good air movement and otherwise clear.   CV: Heart RRR with no murmurs. No JVD, carotid bruits or leg edema.   Neuro: pt is alert and oriented. CN II-XII intact. Strength 5/5 in , biceps, quads, hams. Sensation intact upper and lower extremeties to soft touch. Reflexes are normal and symmetric in biceps, bracheoradialis, patella and ankle. No ankle clonus. Gait is a bit unsteady.      Assessment and Plan - Decision Making    1. Benign paroxysmal positional vertigo, unspecified laterality  Per HPI  - meclizine (ANTIVERT) 25 MG tablet; Take 1 tablet (25 mg) by mouth 2 times daily as needed for dizziness  Dispense: 60 tablet; Refill: 0  - PHYSICAL THERAPY REFERRAL    2. Word finding difficulty  Per HPI  - NEUROLOGY ADULT REFERRAL    3. Sinusitis, unspecified  chronicity, unspecified location  Per HPI  - azithromycin (ZITHROMAX) 250 MG tablet; Two tablets first day, then one tablet daily for four days.  Dispense: 6 tablet; Refill: 0      Written instructions given as follows:    Patient Instructions   1. Try Meclizine for vertigo as needed. May cause dry mouth.    2. Set up therapy:    [facility]  [facility]   [address]  [address]   [place] [address]  Ph: [phone]   Fax: [phone]    3. Set up neurology appointment - [place] [phone].    4. If severe symptoms go to the ER or call [phone].    5. Otherwise see your primary doctor for your routine follow-up.

## 2018-04-18 NOTE — MR AVS SNAPSHOT
After Visit Summary   4/18/2018    Catherine Ferreira    MRN: [id number]           Patient Information     Date Of Birth          1945        Visit Information        Provider Department      4/18/2018 8:10 AM Miah London MD [facility]        Today's Diagnoses     Benign paroxysmal positional vertigo, unspecified laterality    -  1    Word finding difficulty          Care Instructions    1. Try Meclizine for vertigo as needed. May cause dry mouth.    2. Set up therapy:    [facility]   [address]   [place] [address]  Ph: [phone]   Fax: [phone]    3. Set up neurology appointment - [place] [phone].    4. If severe symptoms go to the ER or call 911.    5. Otherwise see your primary doctor for your routine follow-up.           Follow-ups after your visit        Additional Services     NEUROLOGY ADULT REFERRAL       Your provider has referred you to: [facility]: [facility] [phone]   [email or web address]    Please be aware that coverage of these services is subject to the terms and limitations of your health insurance plan.  Call member services at your health plan with any benefit or coverage questions.      Please bring the following to your appointment:    >>   Any x-rays, CTs or MRIs which have been performed.  Contact the facility where they were done to arrange for  prior to your scheduled appointment.  Any new CT, MRI or other procedures ordered by your specialist must be performed at a [facility] or coordinated by your clinic's referral office.    >>   List of current medications   >>   This referral request   >>   Any documents/labs given to you for this referral            PHYSICAL THERAPY REFERRAL       This is a referral to:    [facility]   [address]  [place] [address]   [place] [address]  Ph: [phone]   Fax: [phone]    Dx: BPV    Please evaluate and treat.                  Who to contact     If you have questions or need follow up information about today's clinic visit or your schedule please contact [facility] directly at [phone].  Normal or non-critical lab and imaging results will be communicated to you by MyChart, letter or phone within 4 business days after the clinic has received the results. If you do not hear from us within 7 days, please contact the clinic through MyChart or phone. If you have a critical or abnormal lab result, we will notify you by phone as soon as possible.  Submit refill requests through [employer] or call your pharmacy and they will forward the refill request to us. Please allow 3 business days for your refill to be completed.          Additional Information About Your Visit        Care EveryWhere ID     This is your Care EveryWhere ID. This could be used by other organizations to access your [place] medical records  [id number]        Your Vitals Were     Pulse Temperature Pulse Oximetry             85 97.8  F (36.6  C) (Oral) 95%          Blood Pressure from Last 3 Encounters:   04/18/18 136/76   03/01/18 124/64   10/06/17 139/78    Weight from Last 3 Encounters:   03/01/18 (!) 311 lb (141.1 kg)   06/05/17 (!) 319 lb (144.7 kg)   11/22/16 (!) 326 lb (147.9 kg)              We Performed the Following     NEUROLOGY ADULT REFERRAL     PHYSICAL THERAPY REFERRAL          Today's Medication Changes          These changes are accurate as of 4/18/18  8:46 AM.  If you have any questions, ask your nurse or doctor.               Start taking these medicines.        Dose/Directions    meclizine 25 MG tablet   Commonly known as:  ANTIVERT   Used for:  Benign paroxysmal positional vertigo, unspecified laterality   Started by:  Miah London MD        Dose:  25 mg   Take 1 tablet (25 mg) by  mouth 2 times daily as needed for dizziness   Quantity:  60 tablet   Refills:  0            Where to get your medicines      These medications were sent to [id number] - [place] - [address] [facility]  [address] [address]     Phone:  [phone]     meclizine 25 MG tablet                Primary Care Provider Office Phone # Fax #    David Andrea Dotson [phone] [address] [address]        Equal Access to Services     [facility]: Hadii aad ku hadasho Soomaali, waaxda luqadaha, qaybta kaalmada adeegyada, waxay franin hayaan mellisa martell . So [facility] [phone].    ATENCIÓN: Si habla español, tiene a duggan disposición servicios gratuitos de asistencia lingüística. [facility] [phone].    We comply with applicable federal civil rights laws and [place] laws. We do not discriminate on the basis of race, color, national origin, age, disability, sex, sexual orientation, or gender identity.            Thank you!     Thank you for choosing [facility]  for your care. Our goal is always to provide you with excellent care. Hearing back from our patients is one way we can continue to improve our services. Please take a few minutes to complete the written survey that you may receive in the mail after your visit with us. Thank you!             Your Updated Medication List - Protect others around you: Learn how to safely use, store and throw away your medicines at www.disposemymeds.org.          This list is accurate as of 4/18/18  8:46 AM.  Always use your most recent med list.                   Brand Name Dispense Instructions for use Diagnosis    * albuterol 108 (90 Base) MCG/ACT Inhaler    VENTOLIN HFA    3 Inhaler    INHALE 2 PUFFS BY MOUTH FOUR TIMES DAILY AS NEEDED FOR WHEEZING    Moderate persistent asthma without complication       * albuterol (2.5 MG/3ML) 0.083% neb solution     75 mL    TAKE 1 VIAL  BY NEBULIZATION EVERY 6 HOURS AS NEEDED FOR SHORTNESS OF BREATH, DYSPNEA OR WHEEZING    Moderate persistent asthma with acute exacerbation       amLODIPine 5 MG tablet    NORVASC    90 tablet    Take 1 tablet (5 mg) by mouth daily for blood pressure take at bedtime    Essential hypertension with goal blood pressure less than 140/90       aspirin 81 MG tablet      Take 1 tablet (81 mg) by mouth daily        * atorvastatin 40 MG tablet    LIPITOR    90 tablet    Take 1 tablet (40 mg) by mouth daily At bedtime for cholesterol Pharmacy ok to hold prescription until due    Hyperlipidemia with target LDL less than 100       * atorvastatin 80 MG tablet    LIPITOR    90 tablet    Take 1 tablet (80 mg) by mouth daily For cholesterol this is double dosage from before    Hyperlipidemia with target LDL less than 100       blood glucose monitoring meter device kit    no brand specified    1 kit    Use to test blood sugar 2 times daily or as directed. Dispense one touch or brand per insurance or pt preference.    Type 2 diabetes mellitus with diabetic chronic kidney disease (H), Type 2 diabetes, HbA1C goal < 8% (H)       blood glucose monitoring test strip    no brand specified    200 each    Use to test blood sugars 2 times daily dispense one touch or brand per insurance or pt preference.    Type 2 diabetes mellitus with chronic kidney disease on chronic dialysis, unspecified long term insulin use status (H)       buPROPion 150 MG 12 hr tablet    WELLBUTRIN SR    90 tablet    Take 1 tablet (150 mg) by mouth daily (before lunch) For depression/energy and weight loss    Major depressive disorder, recurrent episode, moderate (H)       CYANOCOBALAMIN PO      Take 1,000 mcg by mouth daily        fluconazole 150 MG tablet    DIFLUCAN    3 tablet    Take 1 pill every 3 days (3 doses) until vaginal symptoms are gone.    Candidiasis of vagina       fluticasone 50 MCG/ACT spray    FLONASE    16 g    Spray 2 sprays into both nostrils daily  as needed for rhinitis or allergies    Chronic rhinitis       fluticasone-salmeterol 500-50 MCG/DOSE diskus inhaler    ADVAIR DISKUS    3 Inhaler    Inhale 1 puff into the lungs 2 times daily For asthma control.Pharmacy ok to hold prescription until due    Moderate persistent asthma without complication       furosemide 20 MG tablet    LASIX    180 tablet    TAKE 2 TABLETS BY MOUTH DAILY WITH BREAKFAST FOR BLOOD PRESSURE    Essential hypertension with goal blood pressure less than 140/90, CHF (congestive heart failure), NYHA class I, unspecified failure chronicity, combined (H)       gabapentin 300 MG capsule    NEURONTIN    90 capsule    TAKE ONE CAPSULE BY MOUTH EVERY NIGHT AT BEDTIME. MAY INCREASE TO TWO CAPSULES AT BEDTIME    Sciatica of right side       glipiZIDE 5 MG 24 hr tablet    GLUCOTROL XL    90 tablet    TAKE 1 TABLET BY MOUTH EVERY DAY WITH BREAKFAST for diabetes    Type 2 diabetes mellitus with chronic kidney disease on chronic dialysis, unspecified long term insulin use status (H)       ibuprofen 600 MG tablet    ADVIL/MOTRIN    60 tablet    Take 1 tablet (600 mg) by mouth every 6 hours as needed for moderate pain    Shingles       ipratropium - albuterol 0.5 mg/2.5 mg/3 mL 0.5-2.5 (3) MG/3ML neb solution    DUONEB    30 vial    Take 1 vial (3 mLs) by nebulization every 6 hours as needed for shortness of breath / dyspnea    SOB (shortness of breath)       Teal OrbitCAN FINEPOINT LANCETS Misc     200 each    Use to test blood sugars 2 times daily dispense one touch brand or brand per patient preference.    Type 2 diabetes mellitus with diabetic chronic kidney disease (H), Type 2 diabetes, HbA1C goal < 8% (H)       * lisinopril 20 MG tablet    PRINIVIL/ZESTRIL    180 tablet    Take 1 tablet (20 mg) by mouth 2 times daily For blood pressure Pharmacy ok to hold prescription until due    Essential hypertension with goal blood pressure less than 140/90       * lisinopril 20 MG tablet    PRINIVIL/ZESTRIL    180  tablet    TAKE 1 TABLET BY MOUTH 2 TIMES DAILY FOR BLOOD PRESSURE    Essential hypertension with goal blood pressure less than 140/90       meclizine 25 MG tablet    ANTIVERT    60 tablet    Take 1 tablet (25 mg) by mouth 2 times daily as needed for dizziness    Benign paroxysmal positional vertigo, unspecified laterality       MELATONIN PO      Take 5 mg by mouth nightly as needed        metFORMIN 500 MG 24 hr tablet    GLUCOPHAGE-XR    180 tablet    TAKE 1 TABLET(500 MG) BY MOUTH TWICE DAILY WITH MEALS for diabetes    Type 2 diabetes mellitus with stage 1 chronic kidney disease, without long-term current use of insulin (H)       METOPROLOL SUCCINATE ER PO      Take 25 mg by mouth daily        montelukast 10 MG tablet    SINGULAIR    90 tablet    Take 1 tablet (10 mg) by mouth daily For asthma/allergies Pharmacy ok to hold prescription until due    Moderate persistent asthma without complication       Multi-vitamin Tabs tablet      Take 1 tablet by mouth daily        nystatin 228178 UNIT/GM Powd    MYCOSTATIN    1 Bottle    Apply 1 g topically 4 times daily    Candidiasis of skin       OMEGA-3 FISH OIL PO      Take 2 g by mouth daily        pantoprazole 40 MG EC tablet    PROTONIX    90 tablet    Take 1 tablet (40 mg) by mouth daily For heartburn. Take 30-60 minutes before a meal.Pharmacy ok to hold prescription until due    Gastroesophageal reflux disease without esophagitis       PARoxetine 20 MG tablet    PAXIL    90 tablet    Take 1 tablet (20 mg) by mouth At Bedtime For anxiety    TEX (generalized anxiety disorder)       polyethylene glycol powder    MIRALAX    510 g    Take 17 g by mouth daily as needed for constipation    Constipation       * Notice:  This list has 6 medication(s) that are the same as other medications prescribed for you. Read the directions carefully, and ask your doctor or other care provider to review them with you.

## 2018-04-19 ASSESSMENT — PATIENT HEALTH QUESTIONNAIRE - PHQ9: SUM OF ALL RESPONSES TO PHQ QUESTIONS 1-9: 5

## 2018-05-01 DIAGNOSIS — I50.40 CHF (CONGESTIVE HEART FAILURE), NYHA CLASS I, UNSPECIFIED FAILURE CHRONICITY, COMBINED (H): ICD-10-CM

## 2018-05-01 DIAGNOSIS — I10 ESSENTIAL HYPERTENSION WITH GOAL BLOOD PRESSURE LESS THAN 140/90: ICD-10-CM

## 2018-05-04 RX ORDER — FUROSEMIDE 20 MG
TABLET ORAL
Qty: 180 TABLET | Refills: 0 | Status: SHIPPED | OUTPATIENT
Start: 2018-05-04 | End: 2018-09-25

## 2018-05-17 ENCOUNTER — TRANSFERRED RECORDS (OUTPATIENT)
Dept: HEALTH INFORMATION MANAGEMENT | Facility: CLINIC | Age: 73
End: 2018-05-17

## 2018-06-07 ENCOUNTER — TRANSFERRED RECORDS (OUTPATIENT)
Dept: HEALTH INFORMATION MANAGEMENT | Facility: CLINIC | Age: 73
End: 2018-06-07

## 2018-08-08 DIAGNOSIS — H81.10 BENIGN PAROXYSMAL POSITIONAL VERTIGO, UNSPECIFIED LATERALITY: ICD-10-CM

## 2018-08-09 DIAGNOSIS — E78.5 HYPERLIPIDEMIA WITH TARGET LDL LESS THAN 100: ICD-10-CM

## 2018-08-09 RX ORDER — ATORVASTATIN CALCIUM 40 MG/1
TABLET, FILM COATED ORAL
Qty: 30 TABLET | Refills: 0 | Status: SHIPPED | OUTPATIENT
Start: 2018-08-09 | End: 2018-09-25

## 2018-08-09 RX ORDER — MECLIZINE HYDROCHLORIDE 25 MG/1
TABLET ORAL
Qty: 60 TABLET | Refills: 2 | Status: SHIPPED | OUTPATIENT
Start: 2018-08-09 | End: 2019-01-05

## 2018-08-09 NOTE — LETTER
August 9, 2018    Catherine Ferreira  [address] [address]    Dear Catherine,       We recently received a refill request for atorvastatin (LIPITOR) 40 MG tablet.  We have refilled this for a one time 30 day supply only because you are due for a:    Diabetes and cholesterol office visit and fasting lab appointment      Please schedule this lab appointment 4-5 days prior to the office visit.     Please call at your earliest convenience so that there will not be a delay with your future refills.          Thank you,   Your [facility] Team/  [phone]

## 2018-08-09 NOTE — TELEPHONE ENCOUNTER
Medication is being filled for 1 time refill only due to:  Patient needs to be seen for fasting lab appointment and appointment with the provider for further refills.  Cholesterol and diabetes  Farida PRINCEN, RN

## 2018-09-14 ENCOUNTER — DOCUMENTATION ONLY (OUTPATIENT)
Dept: LAB | Facility: CLINIC | Age: 73
End: 2018-09-14

## 2018-09-14 DIAGNOSIS — I10 HYPERTENSION GOAL BP (BLOOD PRESSURE) < 140/90: Primary | ICD-10-CM

## 2018-09-14 DIAGNOSIS — E11.22 TYPE 2 DIABETES MELLITUS WITH CHRONIC KIDNEY DISEASE ON CHRONIC DIALYSIS, UNSPECIFIED LONG TERM INSULIN USE STATUS: ICD-10-CM

## 2018-09-14 DIAGNOSIS — N18.6 TYPE 2 DIABETES MELLITUS WITH CHRONIC KIDNEY DISEASE ON CHRONIC DIALYSIS, UNSPECIFIED LONG TERM INSULIN USE STATUS: ICD-10-CM

## 2018-09-14 DIAGNOSIS — Z99.2 TYPE 2 DIABETES MELLITUS WITH CHRONIC KIDNEY DISEASE ON CHRONIC DIALYSIS, UNSPECIFIED LONG TERM INSULIN USE STATUS: ICD-10-CM

## 2018-09-14 DIAGNOSIS — E78.5 HYPERLIPIDEMIA WITH TARGET LDL LESS THAN 100: ICD-10-CM

## 2018-09-14 NOTE — PROGRESS NOTES
Please review lab orders sign and close encounter. Tamia Butt MA/RAYSHAWN    Chol and diabetes appt 9/25/18

## 2018-09-14 NOTE — PROGRESS NOTES
.Please place or confirm orders for upcoming lab appointment on 09/18/18    Thank You  Tiffanie

## 2018-09-18 DIAGNOSIS — E11.22 TYPE 2 DIABETES MELLITUS WITH CHRONIC KIDNEY DISEASE ON CHRONIC DIALYSIS, UNSPECIFIED LONG TERM INSULIN USE STATUS: ICD-10-CM

## 2018-09-18 DIAGNOSIS — Z99.2 TYPE 2 DIABETES MELLITUS WITH CHRONIC KIDNEY DISEASE ON CHRONIC DIALYSIS, UNSPECIFIED LONG TERM INSULIN USE STATUS: ICD-10-CM

## 2018-09-18 DIAGNOSIS — I10 HYPERTENSION GOAL BP (BLOOD PRESSURE) < 140/90: ICD-10-CM

## 2018-09-18 DIAGNOSIS — N18.6 TYPE 2 DIABETES MELLITUS WITH CHRONIC KIDNEY DISEASE ON CHRONIC DIALYSIS, UNSPECIFIED LONG TERM INSULIN USE STATUS: ICD-10-CM

## 2018-09-18 LAB
ALBUMIN SERPL-MCNC: 3 G/DL (ref 3.4–5)
ANION GAP SERPL CALCULATED.3IONS-SCNC: 9 MMOL/L (ref 3–14)
BUN SERPL-MCNC: 19 MG/DL (ref 7–30)
CALCIUM SERPL-MCNC: 9 MG/DL (ref 8.5–10.1)
CHLORIDE SERPL-SCNC: 102 MMOL/L (ref 94–109)
CO2 SERPL-SCNC: 31 MMOL/L (ref 20–32)
CREAT SERPL-MCNC: 1.1 MG/DL (ref 0.52–1.04)
GFR SERPL CREATININE-BSD FRML MDRD: 49 ML/MIN/1.7M2
GLUCOSE SERPL-MCNC: 164 MG/DL (ref 70–99)
HBA1C MFR BLD: 7.3 % (ref 0–5.6)
PHOSPHATE SERPL-MCNC: 3.5 MG/DL (ref 2.5–4.5)
POTASSIUM SERPL-SCNC: 3.9 MMOL/L (ref 3.4–5.3)
SODIUM SERPL-SCNC: 142 MMOL/L (ref 133–144)
TSH SERPL DL<=0.005 MIU/L-ACNC: 2.17 MU/L (ref 0.4–4)

## 2018-09-18 PROCEDURE — 80069 RENAL FUNCTION PANEL: CPT | Performed by: FAMILY MEDICINE

## 2018-09-18 PROCEDURE — 83036 HEMOGLOBIN GLYCOSYLATED A1C: CPT | Performed by: FAMILY MEDICINE

## 2018-09-18 PROCEDURE — 36415 COLL VENOUS BLD VENIPUNCTURE: CPT | Performed by: FAMILY MEDICINE

## 2018-09-18 PROCEDURE — 84443 ASSAY THYROID STIM HORMONE: CPT | Performed by: FAMILY MEDICINE

## 2018-09-25 ENCOUNTER — OFFICE VISIT (OUTPATIENT)
Dept: FAMILY MEDICINE | Facility: CLINIC | Age: 73
End: 2018-09-25
Payer: COMMERCIAL

## 2018-09-25 VITALS
DIASTOLIC BLOOD PRESSURE: 79 MMHG | SYSTOLIC BLOOD PRESSURE: 132 MMHG | WEIGHT: 293 LBS | BODY MASS INDEX: 51.91 KG/M2 | OXYGEN SATURATION: 93 % | HEIGHT: 63 IN | TEMPERATURE: 97.5 F | HEART RATE: 97 BPM | RESPIRATION RATE: 24 BRPM

## 2018-09-25 DIAGNOSIS — J45.40 MODERATE PERSISTENT ASTHMA WITHOUT COMPLICATION: ICD-10-CM

## 2018-09-25 DIAGNOSIS — J45.41 MODERATE PERSISTENT ASTHMA WITH ACUTE EXACERBATION: ICD-10-CM

## 2018-09-25 DIAGNOSIS — R42 LIGHT HEADEDNESS: ICD-10-CM

## 2018-09-25 DIAGNOSIS — E11.22 TYPE 2 DIABETES MELLITUS WITH CHRONIC KIDNEY DISEASE ON CHRONIC DIALYSIS, UNSPECIFIED LONG TERM INSULIN USE STATUS: ICD-10-CM

## 2018-09-25 DIAGNOSIS — N18.6 TYPE 2 DIABETES MELLITUS WITH CHRONIC KIDNEY DISEASE ON CHRONIC DIALYSIS, UNSPECIFIED LONG TERM INSULIN USE STATUS: ICD-10-CM

## 2018-09-25 DIAGNOSIS — Z99.2 TYPE 2 DIABETES MELLITUS WITH CHRONIC KIDNEY DISEASE ON CHRONIC DIALYSIS, UNSPECIFIED LONG TERM INSULIN USE STATUS: ICD-10-CM

## 2018-09-25 DIAGNOSIS — K21.9 GASTROESOPHAGEAL REFLUX DISEASE WITHOUT ESOPHAGITIS: ICD-10-CM

## 2018-09-25 DIAGNOSIS — I10 ESSENTIAL HYPERTENSION WITH GOAL BLOOD PRESSURE LESS THAN 140/90: ICD-10-CM

## 2018-09-25 DIAGNOSIS — I50.40 CHF (CONGESTIVE HEART FAILURE), NYHA CLASS I, UNSPECIFIED FAILURE CHRONICITY, COMBINED (H): ICD-10-CM

## 2018-09-25 DIAGNOSIS — F33.1 MAJOR DEPRESSIVE DISORDER, RECURRENT EPISODE, MODERATE (H): ICD-10-CM

## 2018-09-25 DIAGNOSIS — Z12.31 ENCOUNTER FOR SCREENING MAMMOGRAM FOR BREAST CANCER: Primary | ICD-10-CM

## 2018-09-25 DIAGNOSIS — F41.1 GAD (GENERALIZED ANXIETY DISORDER): ICD-10-CM

## 2018-09-25 PROCEDURE — 99214 OFFICE O/P EST MOD 30 MIN: CPT | Performed by: FAMILY MEDICINE

## 2018-09-25 RX ORDER — LISINOPRIL 20 MG/1
TABLET ORAL
Qty: 180 TABLET | Refills: 1 | Status: SHIPPED | OUTPATIENT
Start: 2018-09-25 | End: 2019-02-19

## 2018-09-25 RX ORDER — BUPROPION HYDROCHLORIDE 150 MG/1
150 TABLET, EXTENDED RELEASE ORAL
Qty: 90 TABLET | Refills: 1 | Status: SHIPPED | OUTPATIENT
Start: 2018-09-25 | End: 2019-02-19

## 2018-09-25 RX ORDER — PANTOPRAZOLE SODIUM 40 MG/1
40 TABLET, DELAYED RELEASE ORAL DAILY
Qty: 90 TABLET | Refills: 1 | Status: SHIPPED | OUTPATIENT
Start: 2018-09-25 | End: 2019-02-19

## 2018-09-25 RX ORDER — PREDNISONE 10 MG/1
TABLET ORAL
Qty: 40 TABLET | Refills: 1 | Status: SHIPPED | OUTPATIENT
Start: 2018-09-25 | End: 2019-02-03

## 2018-09-25 RX ORDER — FUROSEMIDE 20 MG
TABLET ORAL
Qty: 180 TABLET | Refills: 1 | Status: SHIPPED | OUTPATIENT
Start: 2018-09-25 | End: 2019-02-19

## 2018-09-25 RX ORDER — AMLODIPINE BESYLATE 5 MG/1
TABLET ORAL
Qty: 90 TABLET | Refills: 3 | Status: SHIPPED | OUTPATIENT
Start: 2018-09-25 | End: 2019-02-19

## 2018-09-25 RX ORDER — PAROXETINE 20 MG/1
20 TABLET, FILM COATED ORAL AT BEDTIME
Qty: 90 TABLET | Refills: 1 | Status: SHIPPED | OUTPATIENT
Start: 2018-09-25 | End: 2019-02-19

## 2018-09-25 RX ORDER — ALBUTEROL SULFATE 0.83 MG/ML
SOLUTION RESPIRATORY (INHALATION)
Qty: 75 ML | Refills: 1 | Status: SHIPPED | OUTPATIENT
Start: 2018-09-25 | End: 2019-01-05

## 2018-09-25 RX ORDER — GLIPIZIDE 5 MG/1
TABLET, FILM COATED, EXTENDED RELEASE ORAL
Qty: 90 TABLET | Refills: 1 | Status: SHIPPED | OUTPATIENT
Start: 2018-09-25 | End: 2019-02-19

## 2018-09-25 RX ORDER — MONTELUKAST SODIUM 10 MG/1
1 TABLET ORAL DAILY
Qty: 90 TABLET | Refills: 3 | Status: SHIPPED | OUTPATIENT
Start: 2018-09-25 | End: 2019-02-19

## 2018-09-25 RX ORDER — ALBUTEROL SULFATE 90 UG/1
AEROSOL, METERED RESPIRATORY (INHALATION)
Qty: 3 INHALER | Refills: 2 | Status: SHIPPED | OUTPATIENT
Start: 2018-09-25 | End: 2019-02-19

## 2018-09-25 ASSESSMENT — ANXIETY QUESTIONNAIRES
3. WORRYING TOO MUCH ABOUT DIFFERENT THINGS: NOT AT ALL
6. BECOMING EASILY ANNOYED OR IRRITABLE: NOT AT ALL
1. FEELING NERVOUS, ANXIOUS, OR ON EDGE: NOT AT ALL
2. NOT BEING ABLE TO STOP OR CONTROL WORRYING: NOT AT ALL
7. FEELING AFRAID AS IF SOMETHING AWFUL MIGHT HAPPEN: NOT AT ALL
5. BEING SO RESTLESS THAT IT IS HARD TO SIT STILL: NOT AT ALL
GAD7 TOTAL SCORE: 0

## 2018-09-25 ASSESSMENT — PATIENT HEALTH QUESTIONNAIRE - PHQ9: 5. POOR APPETITE OR OVEREATING: NOT AT ALL

## 2018-09-25 NOTE — PROGRESS NOTES
"SUBJECTIVE:  Catherine Ferreira, a 72 year old female scheduled an appointment to discuss the following issues:  Follow-up htn, dm, asthma, high cholesterol and depression/anxiety.   Asthma bad past week. No prednisone in several months. No prednisone at home. Worse asthma in fall/spring.   Taking advair daily and albuterol nebs/mdi 2/day. No fevers or chills. Dry cough. No nausea, vomiting or diarrhea. No black or bloody stools. No urine changes or hematuria.   Outside blood pressure reading ok. Emotionally doing ok.   Vertigo/\"fogginess/forgetfulness.    Medical, social, surgical, and family histories reviewed.    ROS:  All other ROS negative    OBJECTIVE:  /79  Pulse 97  Temp 97.5  F (36.4  C) (Oral)  Resp 24  Ht 5' 3\" (1.6 m)  Wt 316 lb (143.3 kg)  SpO2 93%  BMI 55.98 kg/m2  EXAM:  GENERAL APPEARANCE: healthy, alert and no distress  EYES: EOMI,  PERRL  HENT: ear canals and TM's normal and nose and mouth without ulcers or lesions  NECK: no adenopathy, no asymmetry, masses, or scars and thyroid normal to palpation  RESP: lungs clear to auscultation - no rales, rhonchi or wheezes  CV: regular rates and rhythm, normal S1 S2, no S3 or S4 and no murmur, click or rub -  ABDOMEN:  soft, nontender, no HSM or masses and bowel sounds normal  MS: extremities normal- no gross deformities noted, no evidence of inflammation in joints, FROM in all extremities.  NEURO: Normal strength and tone, sensory exam grossly normal, mentation intact and speech normal  PSYCH: mentation appears normal and affect normal/bright  PSYCH: mildly anxious    ASSESSMENT / PLAN:  (Z12.31) Encounter for screening mammogram for breast cancer  (primary encounter diagnosis)  Plan: *MA Screening Digital Bilateral            (J45.41) Moderate persistent asthma with acute exacerbation  Comment: needs help  Plan: albuterol (2.5 MG/3ML) 0.083% neb solution,         predniSONE (DELTASONE) 10 MG tablet        Short course of prednisone. Back to " allergist if more issues. Continue MDI's.     (J45.40) Moderate persistent asthma without complication  Plan: albuterol (VENTOLIN HFA) 108 (90 Base) MCG/ACT         inhaler, montelukast (SINGULAIR) 10 MG tablet            (I10) Essential hypertension with goal blood pressure less than 140/90  Comment: stable  Plan: amLODIPine (NORVASC) 5 MG tablet, furosemide         (LASIX) 20 MG tablet, lisinopril         (PRINIVIL/ZESTRIL) 20 MG tablet        Continue self-monitor. Recheck in 3 months      (F33.1) Major depressive disorder, recurrent episode, moderate (H)  Comment: stable  Plan: buPROPion (WELLBUTRIN SR) 150 MG 12 hr tablet        Recheck in 6 months  Sooner if worse    (I50.40) CHF (congestive heart failure), NYHA class I, unspecified failure chronicity, combined (H)  Comment: stable  Plan: furosemide (LASIX) 20 MG tablet        Continue monitor weight. To er if rapid weight gain/ chest pain or shortness of breath.    (E11.22,  N18.6,  Z99.2) Type 2 diabetes mellitus with chronic kidney disease on chronic dialysis, unspecified long term insulin use status (H)  Comment: stable.   Plan: glipiZIDE (GLUCOTROL XL) 5 MG 24 hr tablet        Recheck in 6 months      (R42) Light headedness  Comment: briefly discussed. Long time issues  Plan: NEUROLOGY ADULT REFERRAL        Will ask for neurology help with spaciness too. HOLD gabapentin for now and get up slowly/ keep well hydrated.     (K21.9) Gastroesophageal reflux disease without esophagitis  Comment: stable  Plan: pantoprazole (PROTONIX) 40 MG EC tablet            (F41.1) TEX (generalized anxiety disorder)  Comment: stable  Plan: PARoxetine (PAXIL) 20 MG tablet            David Dotson MD

## 2018-09-25 NOTE — MR AVS SNAPSHOT
After Visit Summary   9/25/2018    Catherine Ferreira    MRN: [id number]           Patient Information     Date Of Birth          1945        Visit Information        Provider Department      9/25/2018 2:10 PM David Dotson MD [facility]        Today's Diagnoses     Encounter for screening mammogram for breast cancer    -  1    Moderate persistent asthma with acute exacerbation        Moderate persistent asthma without complication        Essential hypertension with goal blood pressure less than 140/90        Major depressive disorder, recurrent episode, moderate (H)        CHF (congestive heart failure), NYHA class I, unspecified failure chronicity, combined (H)        Type 2 diabetes mellitus with chronic kidney disease on chronic dialysis, unspecified long term insulin use status (H)        Light headedness           Follow-ups after your visit        Additional Services     NEUROLOGY ADULT REFERRAL       Your provider has referred you for the following:   Consult at [facility]: [facility] - Mai Blank [phone]   [email or web address]  [facility]: [facility] [phone]   [email or web address]    Please be aware that coverage of these services is subject to the terms and limitations of your health insurance plan.  Call member services at your health plan with any benefit or coverage questions.      Please bring the following with you to your appointment:    (1) Any X-Rays, CTs or MRIs which have been performed.  Contact the facility where they were done to arrange for  prior to your scheduled appointment.    (2) List of current medications  (3) This referral request   (4) Any documents/labs given to you for this referral                  Future tests that were ordered for you today     Open Future Orders        Priority Expected Expires Ordered    *MA Screening Digital Bilateral Routine   "9/25/2019 9/25/2018            Who to contact     If you have questions or need follow up information about today's clinic visit or your schedule please contact [facility] directly at [phone].  Normal or non-critical lab and imaging results will be communicated to you by MyChart, letter or phone within 4 business days after the clinic has received the results. If you do not hear from us within 7 days, please contact the clinic through MyChart or phone. If you have a critical or abnormal lab result, we will notify you by phone as soon as possible.  Submit refill requests through [facility] or call your pharmacy and they will forward the refill request to us. Please allow 3 business days for your refill to be completed.          Additional Information About Your Visit        Care EveryWhere ID     This is your Care EveryWhere ID. This could be used by other organizations to access your [facility] medical records  [id number]        Your Vitals Were     Pulse Temperature Respirations Height Pulse Oximetry BMI (Body Mass Index)    97 97.5  F (36.4  C) (Oral) 24 5' 3\" (1.6 m) 93% 55.98 kg/m2       Blood Pressure from Last 3 Encounters:   09/25/18 152/77   04/18/18 136/76   03/01/18 124/64    Weight from Last 3 Encounters:   09/25/18 316 lb (143.3 kg)   03/01/18 (!) 311 lb (141.1 kg)   06/05/17 (!) 319 lb (144.7 kg)              We Performed the Following     NEUROLOGY ADULT REFERRAL     PAF COMPLETED          Today's Medication Changes          These changes are accurate as of 9/25/18  2:32 PM.  If you have any questions, ask your nurse or doctor.               Start taking these medicines.        Dose/Directions    predniSONE 10 MG tablet   Commonly known as:  DELTASONE   Used for:  Moderate persistent asthma with acute exacerbation        4pills x1 day, then 3 pills x2 days, then 2 pills x3 days, then 1 pill x4 days. Take with food in AM for asthma flares   Quantity:  40 tablet   Refills:  1          "   Where to get your medicines      These medications were sent to [facility] - [place], [place] - [address] [facility] [address] [facility] [facility] [address] [place]  [address] [facility] [address]     Phone:  [phone]     albuterol (2.5 MG/3ML) 0.083% neb solution    albuterol 108 (90 Base) MCG/ACT inhaler    amLODIPine 5 MG tablet    buPROPion 150 MG 12 hr tablet    furosemide 20 MG tablet    glipiZIDE 5 MG 24 hr tablet    lisinopril 20 MG tablet    montelukast 10 MG tablet    predniSONE 10 MG tablet                Primary Care Provider Office Phone # Fax #    David Dotson [phone] [address] [facility] [address]        Equal Access to Services     NICOLE GRANT : Hadii aad ku hadasho Solaureen, waaxda luqadaha, qaybta kaalmada adeegyada, mikhail martell . [facility] [place] [phone].    ATENCIÓN: Si habla español, tiene a duggan disposición servicios gratuitos de asistencia lingüística. [facility] [phone].    We comply with applicable federal civil rights laws and [place] laws. We do not discriminate on the basis of race, color, national origin, age, disability, sex, sexual orientation, or gender identity.            Thank you!     Thank you for choosing [facility]  for your care. Our goal is always to provide you with excellent care. Hearing back from our patients is one way we can continue to improve our services. Please take a few minutes to complete the written survey that you may receive in the mail after your visit with us. Thank you!             Your Updated Medication List - Protect others around you: Learn how to safely use, store and throw away your medicines at www.disposemymeds.org.          This list is accurate as of 9/25/18  2:32 PM.  Always use your most recent med list.                   Brand Name Dispense Instructions for use Diagnosis    * albuterol (2.5 MG/3ML) 0.083% neb solution     75 mL    TAKE 1 VIAL BY NEBULIZATION EVERY  6 HOURS AS NEEDED FOR SHORTNESS OF BREATH, DYSPNEA OR WHEEZING    Moderate persistent asthma with acute exacerbation       * albuterol 108 (90 Base) MCG/ACT inhaler    VENTOLIN HFA    3 Inhaler    INHALE 2 PUFFS BY MOUTH FOUR TIMES DAILY AS NEEDED FOR WHEEZING    Moderate persistent asthma without complication       amLODIPine 5 MG tablet    NORVASC    90 tablet    TAKE 1 TABLET BY MOUTH DAILY FOR BLOOD PRESSURE    Essential hypertension with goal blood pressure less than 140/90       aspirin 81 MG tablet      Take 1 tablet (81 mg) by mouth daily        atorvastatin 80 MG tablet    LIPITOR    90 tablet    Take 1 tablet (80 mg) by mouth daily For cholesterol this is double dosage from before    Hyperlipidemia with target LDL less than 100       blood glucose monitoring meter device kit    no brand specified    1 kit    Use to test blood sugar 2 times daily or as directed. Dispense one touch or brand per insurance or pt preference.    Type 2 diabetes mellitus with diabetic chronic kidney disease (H), Type 2 diabetes, HbA1C goal < 8% (H)       blood glucose monitoring test strip    no brand specified    200 each    Use to test blood sugars 2 times daily dispense one touch or brand per insurance or pt preference.    Type 2 diabetes mellitus with chronic kidney disease on chronic dialysis, unspecified long term insulin use status (H)       buPROPion 150 MG 12 hr tablet    WELLBUTRIN SR    90 tablet    Take 1 tablet (150 mg) by mouth daily (before lunch) For depression/energy and weight loss    Major depressive disorder, recurrent episode, moderate (H)       CYANOCOBALAMIN PO      Take 1,000 mcg by mouth daily        fluconazole 150 MG tablet    DIFLUCAN    3 tablet    Take 1 pill every 3 days (3 doses) until vaginal symptoms are gone.    Candidiasis of vagina       fluticasone 50 MCG/ACT spray    FLONASE    16 g    Spray 2 sprays into both nostrils daily as needed for rhinitis or allergies    Chronic rhinitis        fluticasone-salmeterol 500-50 MCG/DOSE diskus inhaler    ADVAIR DISKUS    3 Inhaler    Inhale 1 puff into the lungs 2 times daily For asthma control.Pharmacy ok to hold prescription until due    Moderate persistent asthma without complication       furosemide 20 MG tablet    LASIX    180 tablet    TAKE 2 TABLETS BY MOUTH DAILY WITH BREAKFAST FOR BLOOD PRESSURE    Essential hypertension with goal blood pressure less than 140/90, CHF (congestive heart failure), NYHA class I, unspecified failure chronicity, combined (H)       gabapentin 300 MG capsule    NEURONTIN    90 capsule    TAKE 1 CAPSULE BY MOUTH EVERY NIGHT AT BEDTIME. MAY INCREASE TO 2 CAPSULES AT BEDTIME    Sciatica of right side       glipiZIDE 5 MG 24 hr tablet    GLUCOTROL XL    90 tablet    TAKE 1 TABLET BY MOUTH EVERY DAY WITH BREAKFAST for diabetes    Type 2 diabetes mellitus with chronic kidney disease on chronic dialysis, unspecified long term insulin use status (H)       ibuprofen 600 MG tablet    ADVIL/MOTRIN    60 tablet    Take 1 tablet (600 mg) by mouth every 6 hours as needed for moderate pain    Shingles       ipratropium - albuterol 0.5 mg/2.5 mg/3 mL 0.5-2.5 (3) MG/3ML neb solution    DUONEB    30 vial    Take 1 vial (3 mLs) by nebulization every 6 hours as needed for shortness of breath / dyspnea    SOB (shortness of breath)       Tresorit FINEPOINT LANCETS Misc     200 each    Use to test blood sugars 2 times daily dispense one touch brand or brand per patient preference.    Type 2 diabetes mellitus with diabetic chronic kidney disease (H), Type 2 diabetes, HbA1C goal < 8% (H)       lisinopril 20 MG tablet    PRINIVIL/ZESTRIL    180 tablet    TAKE 1 TABLET BY MOUTH 2 TIMES DAILY FOR BLOOD PRESSURE    Essential hypertension with goal blood pressure less than 140/90       meclizine 25 MG tablet    ANTIVERT    60 tablet    TAKE 1 TABLET(25 MG) BY MOUTH TWICE DAILY AS NEEDED FOR DIZZINESS    Benign paroxysmal positional vertigo, unspecified  laterality       MELATONIN PO      Take 5 mg by mouth nightly as needed        * metFORMIN 500 MG 24 hr tablet    GLUCOPHAGE-XR    180 tablet    TAKE 1 TABLET(500 MG) BY MOUTH TWICE DAILY WITH MEALS for diabetes    Type 2 diabetes mellitus with stage 1 chronic kidney disease, without long-term current use of insulin (H)       * metFORMIN 500 MG 24 hr tablet    GLUCOPHAGE-XR    180 tablet    TAKE 1 TABLET BY MOUTH TWICE DAILY WITH MEALS    Type 2 diabetes mellitus with stage 1 chronic kidney disease, without long-term current use of insulin (H)       METOPROLOL SUCCINATE ER PO      Take 25 mg by mouth daily        montelukast 10 MG tablet    SINGULAIR    90 tablet    Take 1 tablet (10 mg) by mouth daily For asthma/allergies Pharmacy ok to hold prescription until due    Moderate persistent asthma without complication       Multi-vitamin Tabs tablet      Take 1 tablet by mouth daily        nystatin 259518 UNIT/GM Powd    MYCOSTATIN    1 Bottle    Apply 1 g topically 4 times daily    Candidiasis of skin       OMEGA-3 FISH OIL PO      Take 2 g by mouth daily        pantoprazole 40 MG EC tablet    PROTONIX    90 tablet    Take 1 tablet (40 mg) by mouth daily For heartburn. Take 30-60 minutes before a meal.Pharmacy ok to hold prescription until due    Gastroesophageal reflux disease without esophagitis       PARoxetine 20 MG tablet    PAXIL    90 tablet    Take 1 tablet (20 mg) by mouth At Bedtime For anxiety    TEX (generalized anxiety disorder)       polyethylene glycol powder    MIRALAX    510 g    Take 17 g by mouth daily as needed for constipation    Constipation       predniSONE 10 MG tablet    DELTASONE    40 tablet    4pills x1 day, then 3 pills x2 days, then 2 pills x3 days, then 1 pill x4 days. Take with food in AM for asthma flares    Moderate persistent asthma with acute exacerbation       * Notice:  This list has 4 medication(s) that are the same as other medications prescribed for you. Read the directions  carefully, and ask your doctor or other care provider to review them with you.

## 2018-09-25 NOTE — LETTER
My Depression Action Plan  Name: Catherine Ferreira   Date of Birth 1945  Date: 9/25/2018    My doctor: David Dotson   My clinic: [address] [address] [phone]          GREEN    ZONE   Good Control    What it looks like:     Things are going generally well. You have normal up s and down s. You may even feel depressed from time to time, but bad moods usually last less than a day.   What you need to do:  1. Continue to care for yourself (see self care plan)  2. Check your depression survival kit and update it as needed  3. Follow your physician s recommendations including any medication.  4. Do not stop taking medication unless you consult with your physician first.           YELLOW         ZONE Getting Worse    What it looks like:     Depression is starting to interfere with your life.     It may be hard to get out of bed; you may be starting to isolate yourself from others.    Symptoms of depression are starting to last most all day and this has happened for several days.     You may have suicidal thoughts but they are not constant.   What you need to do:     1. Call your care team, your response to treatment will improve if you keep your care team informed of your progress. Yellow periods are signs an adjustment may need to be made.     2. Continue your self-care, even if you have to fake it!    3. Talk to someone in your support network    4. Open up your depression survival kit           RED    ZONE Medical Alert - Get Help    What it looks like:     Depression is seriously interfering with your life.     You may experience these or other symptoms: You can t get out of bed most days, can t work or engage in other necessary activities, you have trouble taking care of basic hygiene, or basic responsibilities, thoughts of suicide or death that will not go away, self-injurious behavior.     What you need to do:  1. Call your care team and  request a same-day appointment. If they are not available (weekends or after hours) call your local crisis line, emergency room or 911.            Depression Self Care Plan / Survival Kit    Self-Care for Depression  Here s the deal. Your body and mind are really not as separate as most people think.  What you do and think affects how you feel and how you feel influences what you do and think. This means if you do things that people who feel good do, it will help you feel better.  Sometimes this is all it takes.  There is also a place for medication and therapy depending on how severe your depression is, so be sure to consult with your medical provider and/ or Behavioral Health Consultant if your symptoms are worsening or not improving.     In order to better manage my stress, I will:    Exercise  Get some form of exercise, every day. This will help reduce pain and release endorphins, the  feel good  chemicals in your brain. This is almost as good as taking antidepressants!  This is not the same as joining a gym and then never going! (they count on that by the way ) It can be as simple as just going for a walk or doing some gardening, anything that will get you moving.      Hygiene   Maintain good hygiene (Get out of bed in the morning, Make your bed, Brush your teeth, Take a shower, and Get dressed like you were going to work, even if you are unemployed).  If your clothes don't fit try to get ones that do.    Diet  I will strive to eat foods that are good for me, drink plenty of water, and avoid excessive sugar, caffeine, alcohol, and other mood-altering substances.  Some foods that are helpful in depression are: complex carbohydrates, B vitamins, flaxseed, fish or fish oil, fresh fruits and vegetables.    Psychotherapy  I agree to participate in Individual Therapy (if recommended).    Medication  If prescribed medications, I agree to take them.  Missing doses can result in serious side effects.  I understand that  drinking alcohol, or other illicit drug use, may cause potential side effects.  I will not stop my medication abruptly without first discussing it with my provider.    Staying Connected With Others  I will stay in touch with my friends, family members, and my primary care provider/team.    Use your imagination  Be creative.  We all have a creative side; it doesn t matter if it s oil painting, sand castles, or mud pies! This will also kick up the endorphins.    Witness Beauty  (AKA stop and smell the roses) Take a look outside, even in mid-winter. Notice colors, textures. Watch the squirrels and birds.     Service to others  Be of service to others.  There is always someone else in need.  By helping others we can  get out of ourselves  and remember the really important things.  This also provides opportunities for practicing all the other parts of the program.    Humor  Laugh and be silly!  Adjust your TV habits for less news and crime-drama and more comedy.    Control your stress  Try breathing deep, massage therapy, biofeedback, and meditation. Find time to relax each day.     My support system    Clinic Contact:  Phone number:    Contact 1:  Phone number:    Contact 2:  Phone number:    Oriental orthodox/:  Phone number:    Therapist:  Phone number:    Local crisis center:    Phone number:    Other community support:  Phone number:

## 2018-09-25 NOTE — NURSING NOTE
"Chief Complaint   Patient presents with     Diabetes     Lipids     Health Maintenance       Initial /77  Pulse 97  Temp 97.5  F (36.4  C) (Oral)  Resp 24  Ht 5' 3\" (1.6 m)  Wt 316 lb (143.3 kg)  SpO2 93%  BMI 55.98 kg/m2 Estimated body mass index is 55.98 kg/(m^2) as calculated from the following:    Height as of this encounter: 5' 3\" (1.6 m).    Weight as of this encounter: 316 lb (143.3 kg).    Farida Morley, CMA    "

## 2018-09-25 NOTE — LETTER
My Asthma Action Plan  Name: Catherine Ferreira   YOB: 1945  Date: 9/25/2018   My doctor: David Dotson MD   My clinic: [facility]        My Control Medicine: { :656901}  My Rescue Medicine: { :546930}  {AAP include Oral Steroid:689395} My Asthma Severity: { :855375}  Avoid your asthma triggers: { :166052}  cold air     {Is patient a child or adult?:880583}       GREEN ZONE   Good Control    I feel good    No cough or wheeze    Can work, sleep and play without asthma symptoms       Take your asthma control medicine every day.     1. If exercise triggers your asthma, take your rescue medication    15 minutes before exercise or sports, and    During exercise if you have asthma symptoms  2. Spacer to use with inhaler: If you have a spacer, make sure to use it with your inhaler             YELLOW ZONE Getting Worse  I have ANY of these:    I do not feel good    Cough or wheeze    Chest feels tight    Wake up at night   1. Keep taking your Green Zone medications  2. Start taking your rescue medicine:    every 20 minutes for up to 1 hour. Then every 4 hours for 24-48 hours.  3. If you stay in the Yellow Zone for more than 12-24 hours, contact your doctor.  4. If you do not return to the Green Zone in 12-24 hours or you get worse, start taking your oral steroid medicine if prescribed by your provider.           RED ZONE Medical Alert - Get Help  I have ANY of these:    I feel awful    Medicine is not helping    Breathing getting harder    Trouble walking or talking    Nose opens wide to breathe       1. Take your rescue medicine NOW  2. If your provider has prescribed an oral steroid medicine, start taking it NOW  3. Call your doctor NOW  4. If you are still in the Red Zone after 20 minutes and you have not reached your doctor:    Take your rescue medicine again and    Call 911 or go to the emergency room right away    See your regular doctor within 2 weeks of an Emergency Room or Urgent Care  visit for follow-up treatment.          Annual Reminders:  Meet with Asthma Educator,  Flu Shot in the Fall, consider Pneumonia Vaccination for patients with asthma (aged 19 and older).    [facility]    [facility] - [place], [place] - [address] [facility] - [place] - [address] AT [facility] [address]                      Asthma Triggers  How To Control Things That Make Your Asthma Worse    Triggers are things that make your asthma worse.  Look at the list below to help you find your triggers and what you can do about them.  You can help prevent asthma flare-ups by staying away from your triggers.      Trigger                                                          What you can do   Cigarette Smoke  Tobacco smoke can make asthma worse. Do not allow smoking in your home, car or around you.  Be sure no one smokes at a child s day care or school.  If you smoke, ask your health care provider for ways to help you quit.  Ask family members to quit too.  Ask your health care provider for a referral to Quit Plan to help you quit smoking, or call 3-433-589-PLAN.     Colds, Flu, Bronchitis  These are common triggers of asthma. Wash your hands often.  Don t touch your eyes, nose or mouth.  Get a flu shot every year.     Dust Mites  These are tiny bugs that live in cloth or carpet. They are too small to see. Wash sheets and blankets in hot water every week.   Encase pillows and mattress in dust mite proof covers.  Avoid having carpet if you can. If you have carpet, vacuum weekly.   Use a dust mask and HEPA vacuum.   Pollen and Outdoor Mold  Some people are allergic to trees, grass, or weed pollen, or molds. Try to keep your windows closed.  Limit time out doors when pollen count is high.   Ask you health care provider about taking medicine during allergy season.     Animal Dander  Some people are allergic to skin flakes, urine or  saliva from pets with fur or feathers. Keep pets with fur or feathers out of your home.    If you can t keep the pet outdoors, then keep the pet out of your bedroom.  Keep the bedroom door closed.  Keep pets off cloth furniture and away from stuffed toys.     Mice, Rats, and Cockroaches  Some people are allergic to the waste from these pests.   Cover food and garbage.  Clean up spills and food crumbs.  Store grease in the refrigerator.   Keep food out of the bedroom.   Indoor Mold  This can be a trigger if your home has high moisture. Fix leaking faucets, pipes, or other sources of water.   Clean moldy surfaces.  Dehumidify basement if it is damp and smelly.   Smoke, Strong Odors, and Sprays  These can reduce air quality. Stay away from strong odors and sprays, such as perfume, powder, hair spray, paints, smoke incense, paint, cleaning products, candles and new carpet.   Exercise or Sports  Some people with asthma have this trigger. Be active!  Ask your doctor about taking medicine before sports or exercise to prevent symptoms.    Warm up for 5-10 minutes before and after sports or exercise.     Other Triggers of Asthma  Cold air:  Cover your nose and mouth with a scarf.  Sometimes laughing or crying can be a trigger.  Some medicines and food can trigger asthma.

## 2018-09-26 ASSESSMENT — ASTHMA QUESTIONNAIRES: ACT_TOTALSCORE: 9

## 2018-09-26 ASSESSMENT — ANXIETY QUESTIONNAIRES: GAD7 TOTAL SCORE: 0

## 2018-09-26 ASSESSMENT — PATIENT HEALTH QUESTIONNAIRE - PHQ9: SUM OF ALL RESPONSES TO PHQ QUESTIONS 1-9: 17

## 2018-09-27 DIAGNOSIS — E78.5 HYPERLIPIDEMIA WITH TARGET LDL LESS THAN 100: ICD-10-CM

## 2018-09-28 ENCOUNTER — TELEPHONE (OUTPATIENT)
Dept: FAMILY MEDICINE | Facility: CLINIC | Age: 73
End: 2018-09-28

## 2018-09-28 DIAGNOSIS — N18.6 TYPE 2 DIABETES MELLITUS WITH CHRONIC KIDNEY DISEASE ON CHRONIC DIALYSIS, UNSPECIFIED LONG TERM INSULIN USE STATUS: Primary | ICD-10-CM

## 2018-09-28 DIAGNOSIS — E55.9 VITAMIN D DEFICIENCY: ICD-10-CM

## 2018-09-28 DIAGNOSIS — E11.22 TYPE 2 DIABETES MELLITUS WITH CHRONIC KIDNEY DISEASE ON CHRONIC DIALYSIS, UNSPECIFIED LONG TERM INSULIN USE STATUS: Primary | ICD-10-CM

## 2018-09-28 DIAGNOSIS — Z99.2 TYPE 2 DIABETES MELLITUS WITH CHRONIC KIDNEY DISEASE ON CHRONIC DIALYSIS, UNSPECIFIED LONG TERM INSULIN USE STATUS: Primary | ICD-10-CM

## 2018-09-28 RX ORDER — CHOLECALCIFEROL (VITAMIN D3) 50 MCG
2000 TABLET ORAL DAILY
Qty: 90 TABLET | Refills: 3 | Status: SHIPPED | OUTPATIENT
Start: 2018-09-28 | End: 2019-02-19

## 2018-09-28 RX ORDER — ATORVASTATIN CALCIUM 40 MG/1
TABLET, FILM COATED ORAL
Qty: 90 TABLET | Refills: 1 | OUTPATIENT
Start: 2018-09-28

## 2018-09-28 NOTE — TELEPHONE ENCOUNTER
Pharmacy Message:  Catherine Ferreira BRITTANY 1945 is requesting a refill of her Vitamin D 50,000 and her Asprin 81mg. We do not have current prescriptions for either. Please send us a new prescriptions.

## 2018-10-29 ENCOUNTER — TRANSFERRED RECORDS (OUTPATIENT)
Dept: HEALTH INFORMATION MANAGEMENT | Facility: CLINIC | Age: 73
End: 2018-10-29

## 2018-11-21 ENCOUNTER — TRANSFERRED RECORDS (OUTPATIENT)
Dept: HEALTH INFORMATION MANAGEMENT | Facility: CLINIC | Age: 73
End: 2018-11-21

## 2018-12-22 ENCOUNTER — NURSE TRIAGE (OUTPATIENT)
Dept: NURSING | Facility: CLINIC | Age: 73
End: 2018-12-22

## 2018-12-22 NOTE — TELEPHONE ENCOUNTER
"\"I have been trying to break loose this congestion in my head and chest.\" Patient reporting symptoms started 1 1/2 weeks ago. Patient stating she had Prednisone prescription that she started 1 week ago. Afebrile. Frequent cough that wakes her from sleep. Patient is using Albuterol nebulizer BID and Inhaler 3 times a day with some improvement. Reports ongoing wheezing. Patient feels the cough is increasing.  Per guidelines advised patient to be seen with in 4 hours. Caller verbalized understanding. Denies further questions.    Montserrat Dale RN  [facility]      Reason for Disposition    Wheezing is present    Additional Information    Negative: Severe difficulty breathing (e.g., struggling for each breath, speaks in single words)    Negative: Bluish lips, tongue, or face now    Negative: [1] Difficulty breathing AND [2] exposure to flames, smoke, or fumes    Negative: [1] Stridor AND [2] difficulty breathing    Negative: Sounds like a life-threatening emergency to the triager    Negative: [1] Previous asthma attacks AND [2] this feels like asthma attack    Negative: Dry (non-productive) cough (i.e., no sputum or minimal clear sputum)    Negative: Chest pain  (Exception: MILD central chest pain, present only when coughing)    Negative: Difficulty breathing    Negative: Patient sounds very sick or weak to the triager    Negative: [1] Coughed up blood AND [2] > 1 tablespoon (15 ml) (Exception: blood-tinged sputum)    Negative: Fever > 103 F (39.4 C)    Negative: [1] Fever > 101 F (38.3 C) AND [2] age > 60    Negative: [1] Fever > 101 F (38.3 C) AND [2] bedridden (e.g., nursing home patient, CVA, chronic illness, recovering from surgery)    Negative: [1] Fever > 100.5 F (38.1 C) AND [2] diabetes mellitus or weak immune system (e.g., HIV positive, cancer chemo, splenectomy, organ transplant, chronic steroids)    Protocols used: COUGH - ACUTE PRODUCTIVE-ADULT-AH      "

## 2019-01-05 ENCOUNTER — OFFICE VISIT (OUTPATIENT)
Dept: URGENT CARE | Facility: URGENT CARE | Age: 74
End: 2019-01-05
Payer: COMMERCIAL

## 2019-01-05 ENCOUNTER — ANCILLARY PROCEDURE (OUTPATIENT)
Dept: GENERAL RADIOLOGY | Facility: CLINIC | Age: 74
End: 2019-01-05
Payer: COMMERCIAL

## 2019-01-05 VITALS
TEMPERATURE: 97.7 F | HEART RATE: 79 BPM | SYSTOLIC BLOOD PRESSURE: 132 MMHG | DIASTOLIC BLOOD PRESSURE: 71 MMHG | BODY MASS INDEX: 56.69 KG/M2 | OXYGEN SATURATION: 93 % | WEIGHT: 293 LBS

## 2019-01-05 DIAGNOSIS — J45.41 MODERATE PERSISTENT ASTHMA WITH ACUTE EXACERBATION: Primary | ICD-10-CM

## 2019-01-05 DIAGNOSIS — J45.41 MODERATE PERSISTENT ASTHMA WITH ACUTE EXACERBATION: ICD-10-CM

## 2019-01-05 DIAGNOSIS — J45.40 MODERATE PERSISTENT ASTHMA WITHOUT COMPLICATION: ICD-10-CM

## 2019-01-05 PROCEDURE — 71046 X-RAY EXAM CHEST 2 VIEWS: CPT

## 2019-01-05 PROCEDURE — 99214 OFFICE O/P EST MOD 30 MIN: CPT | Performed by: PHYSICIAN ASSISTANT

## 2019-01-05 RX ORDER — DOXYCYCLINE 100 MG/1
100 CAPSULE ORAL 2 TIMES DAILY
Qty: 20 CAPSULE | Refills: 0 | Status: SHIPPED | OUTPATIENT
Start: 2019-01-05 | End: 2019-02-03

## 2019-01-05 RX ORDER — PREDNISONE 20 MG/1
20 TABLET ORAL 2 TIMES DAILY
Qty: 10 TABLET | Refills: 0 | Status: SHIPPED | OUTPATIENT
Start: 2019-01-05 | End: 2019-02-03

## 2019-01-05 RX ORDER — ALBUTEROL SULFATE 90 UG/1
AEROSOL, METERED RESPIRATORY (INHALATION)
Qty: 3 INHALER | Refills: 2 | Status: CANCELLED | OUTPATIENT
Start: 2019-01-05

## 2019-01-05 RX ORDER — ALBUTEROL SULFATE 0.83 MG/ML
SOLUTION RESPIRATORY (INHALATION)
Qty: 75 ML | Refills: 1 | Status: SHIPPED | OUTPATIENT
Start: 2019-01-05 | End: 2020-11-24

## 2019-01-05 NOTE — PROGRESS NOTES
"SUBJECTIVE:   Catherine Ferreira is a 73 year old female with history of asthma presenting with a chief complaint of cough - productive.  Onset of symptoms was 2.5 week(s) ago.  Course of illness is worsening. She admits to not taking advair controller.  \"I have so many pills\".   Severity moderate  Current and Associated symptoms: cough - productive, wheezing, shortness of breath and fatigue  Treatment measures tried include Inhaler (name: albuterol 2x daily) and has been using some prednisone 5 mg recently to help with symptoms.  Predisposing factors include HX of asthma. Living in assisted living but did not tell the nurse in facility that she was having trouble with cough and wheezing. She did not make an appointment with primary clinic \"I could not get there\". Relaying that in some ways she is improved at least with her energy.    Past Medical History:   Diagnosis Date     Asthma      Cataract      GERD (gastroesophageal reflux disease)      Glaucoma      Hiatal hernia      hx of ITP      Hyperlipidemia      Hypertension      Obesity      Osteoarthritis      Current Outpatient Medications   Medication Sig Dispense Refill     albuterol (2.5 MG/3ML) 0.083% neb solution TAKE 1 VIAL BY NEBULIZATION EVERY 6 HOURS AS NEEDED FOR SHORTNESS OF BREATH, DYSPNEA OR WHEEZING 75 mL 1     albuterol (VENTOLIN HFA) 108 (90 Base) MCG/ACT inhaler INHALE 2 PUFFS BY MOUTH FOUR TIMES DAILY AS NEEDED FOR WHEEZING 3 Inhaler 2     amLODIPine (NORVASC) 5 MG tablet TAKE 1 TABLET BY MOUTH DAILY FOR BLOOD PRESSURE 90 tablet 3     atorvastatin (LIPITOR) 80 MG tablet Take 1 tablet (80 mg) by mouth daily For cholesterol this is double dosage from before 90 tablet 3     blood glucose monitoring (NO BRAND SPECIFIED) meter device kit Use to test blood sugar 2 times daily or as directed. Dispense one touch or brand per insurance or pt preference. 1 kit 0     blood glucose monitoring (NO BRAND SPECIFIED) test strip Use to test blood sugars 2 times daily " dispense one touch or brand per insurance or pt preference. 200 each 3     buPROPion (WELLBUTRIN SR) 150 MG 12 hr tablet Take 1 tablet (150 mg) by mouth daily (before lunch) For depression/energy and weight loss 90 tablet 1     Cholecalciferol (VITAMIN D) 2000 units tablet Take 2,000 Units by mouth daily 90 tablet 3     CYANOCOBALAMIN PO Take 1,000 mcg by mouth daily       fluticasone (FLONASE) 50 MCG/ACT nasal spray Spray 2 sprays into both nostrils daily as needed for rhinitis or allergies 16 g 11     fluticasone-salmeterol (ADVAIR DISKUS) 500-50 MCG/DOSE diskus inhaler Inhale 1 puff into the lungs 2 times daily For asthma control.Pharmacy ok to hold prescription until due 3 Inhaler 1     furosemide (LASIX) 20 MG tablet TAKE 2 TABLETS BY MOUTH DAILY WITH BREAKFAST FOR BLOOD PRESSURE 180 tablet 1     glipiZIDE (GLUCOTROL XL) 5 MG 24 hr tablet TAKE 1 TABLET BY MOUTH EVERY DAY WITH BREAKFAST for diabetes 90 tablet 1     ibuprofen (ADVIL,MOTRIN) 600 MG tablet Take 1 tablet (600 mg) by mouth every 6 hours as needed for moderate pain 60 tablet 0     ipratropium - albuterol 0.5 mg/2.5 mg/3 mL (DUONEB) 0.5-2.5 (3) MG/3ML nebulization Take 1 vial (3 mLs) by nebulization every 6 hours as needed for shortness of breath / dyspnea 30 vial 1     [employer] FINEPOINT LANCETS MISC Use to test blood sugars 2 times daily dispense one touch brand or brand per patient preference. 200 each 1     lisinopril (PRINIVIL/ZESTRIL) 20 MG tablet TAKE 1 TABLET BY MOUTH 2 TIMES DAILY FOR BLOOD PRESSURE 180 tablet 1     MELATONIN PO Take 5 mg by mouth nightly as needed       metFORMIN (GLUCOPHAGE-XR) 500 MG 24 hr tablet TAKE 1 TABLET BY MOUTH TWICE DAILY WITH MEALS 180 tablet 0     metFORMIN (GLUCOPHAGE-XR) 500 MG 24 hr tablet TAKE 1 TABLET(500 MG) BY MOUTH TWICE DAILY WITH MEALS for diabetes 180 tablet 3     METOPROLOL SUCCINATE ER PO Take 25 mg by mouth daily       montelukast (SINGULAIR) 10 MG tablet Take 1 tablet (10 mg) by mouth daily For  asthma/allergies Pharmacy ok to hold prescription until due 90 tablet 3     multivitamin, therapeutic with minerals (MULTI-VITAMIN) TABS Take 1 tablet by mouth daily       Omega-3 Fatty Acids (OMEGA-3 FISH OIL PO) Take 2 g by mouth daily       pantoprazole (PROTONIX) 40 MG EC tablet Take 1 tablet (40 mg) by mouth daily For heartburn. Take 30-60 minutes before a meal.Pharmacy ok to hold prescription until due 90 tablet 1     PARoxetine (PAXIL) 20 MG tablet Take 1 tablet (20 mg) by mouth At Bedtime For anxiety 90 tablet 1     polyethylene glycol (MIRALAX) powder Take 17 g by mouth daily as needed for constipation 510 g 1     predniSONE (DELTASONE) 10 MG tablet 4pills x1 day, then 3 pills x2 days, then 2 pills x3 days, then 1 pill x4 days. Take with food in AM for asthma flares 40 tablet 1     Social History     Tobacco Use     Smoking status: Never Smoker     Smokeless tobacco: Never Used   Substance Use Topics     Alcohol use: Yes     Comment: monthly, one beer       ROS:  CONSTITUTIONAL:NEGATIVE for fever, chills, change in weight  ENT/MOUTH: nasal congestion  RESP: wheezing and dyspnea. Chest tightness    OBJECTIVE:  /71   Pulse 101   Temp 97.7  F (36.5  C) (Oral)   Wt 145.2 kg (320 lb)   SpO2 90%   BMI 56.69 kg/m    GENERAL APPEARANCE: healthy, alert and no distress  EYES: EOMI,  PERRL, conjunctiva clear  HENT: ear canals and TM's normal.  Nose and mouth without ulcers, erythema or lesions  NECK: supple, nontender, no lymphadenopathy  RESP: expiratory wheezes throughout and inspiratory wheezes throughout and moderate.   CV: regular rates and rhythm, normal S1 S2, no murmur noted  ABDOMEN:  soft, nontender, no HSM or masses and bowel sounds normal  NEURO: Normal strength and tone, sensory exam grossly normal,  normal speech and mentation  SKIN: no suspicious lesions or rashes    Chest xray: similar to earlier xray this year. No infiltrate identified.     ASSESSMENT:      1. Moderate persistent asthma  with acute exacerbation    - XR Chest 2 Views; Future  - fluticasone-salmeterol (ADVAIR DISKUS) 500-50 MCG/DOSE inhaler; Inhale 1 puff into the lungs 2 times daily For asthma control.Pharmacy ok to hold prescription until due  Dispense: 3 Inhaler; Refill: 1  - albuterol (PROVENTIL) (2.5 MG/3ML) 0.083% neb solution; TAKE 1 VIAL BY NEBULIZATION EVERY 6 HOURS AS NEEDED FOR SHORTNESS OF BREATH, DYSPNEA OR WHEEZING  Dispense: 75 mL; Refill: 1  - predniSONE (DELTASONE) 20 MG tablet; Take 20 mg by mouth 2 times daily for 5 days.  Dispense: 10 tablet; Refill: 0  - doxycycline hyclate (VIBRAMYCIN) 100 MG capsule; Take 1 capsule (100 mg) by mouth 2 times daily for 10 days  Dispense: 20 capsule; Refill: 0    2. Moderate persistent asthma without complication    PLAN: she will return to taking advair as prescribed daily. Albuterol nebs 4x daily for 3 days then every 2-4 hrs as needed.   Prednisone and doxycyline.   Fluids, Rest and follow up with primary clinic in 2 days if not improving. She is aware and in agreement.   See orders in Epic

## 2019-02-03 ENCOUNTER — OFFICE VISIT (OUTPATIENT)
Dept: URGENT CARE | Facility: URGENT CARE | Age: 74
End: 2019-02-03
Payer: COMMERCIAL

## 2019-02-03 ENCOUNTER — ANCILLARY PROCEDURE (OUTPATIENT)
Dept: GENERAL RADIOLOGY | Facility: CLINIC | Age: 74
End: 2019-02-03
Payer: COMMERCIAL

## 2019-02-03 VITALS
BODY MASS INDEX: 55.45 KG/M2 | DIASTOLIC BLOOD PRESSURE: 78 MMHG | TEMPERATURE: 99.2 F | OXYGEN SATURATION: 93 % | HEART RATE: 82 BPM | SYSTOLIC BLOOD PRESSURE: 140 MMHG | WEIGHT: 293 LBS | RESPIRATION RATE: 28 BRPM

## 2019-02-03 DIAGNOSIS — S39.92XA INJURY OF BACK, INITIAL ENCOUNTER: ICD-10-CM

## 2019-02-03 DIAGNOSIS — R05.9 COUGH: ICD-10-CM

## 2019-02-03 DIAGNOSIS — R05.9 COUGH: Primary | ICD-10-CM

## 2019-02-03 LAB
BASOPHILS # BLD AUTO: 0 10E9/L (ref 0–0.2)
BASOPHILS NFR BLD AUTO: 0.4 %
DIFFERENTIAL METHOD BLD: ABNORMAL
EOSINOPHIL # BLD AUTO: 0.5 10E9/L (ref 0–0.7)
EOSINOPHIL NFR BLD AUTO: 4.5 %
ERYTHROCYTE [DISTWIDTH] IN BLOOD BY AUTOMATED COUNT: 15 % (ref 10–15)
HCT VFR BLD AUTO: 41.6 % (ref 35–47)
HGB BLD-MCNC: 12.8 G/DL (ref 11.7–15.7)
LYMPHOCYTES # BLD AUTO: 2.1 10E9/L (ref 0.8–5.3)
LYMPHOCYTES NFR BLD AUTO: 19.5 %
MCH RBC QN AUTO: 28.1 PG (ref 26.5–33)
MCHC RBC AUTO-ENTMCNC: 30.8 G/DL (ref 31.5–36.5)
MCV RBC AUTO: 91 FL (ref 78–100)
MONOCYTES # BLD AUTO: 0.8 10E9/L (ref 0–1.3)
MONOCYTES NFR BLD AUTO: 7.3 %
NEUTROPHILS # BLD AUTO: 7.2 10E9/L (ref 1.6–8.3)
NEUTROPHILS NFR BLD AUTO: 68.3 %
PLATELET # BLD AUTO: 290 10E9/L (ref 150–450)
RBC # BLD AUTO: 4.55 10E12/L (ref 3.8–5.2)
WBC # BLD AUTO: 10.5 10E9/L (ref 4–11)

## 2019-02-03 PROCEDURE — 85025 COMPLETE CBC W/AUTO DIFF WBC: CPT | Performed by: NURSE PRACTITIONER

## 2019-02-03 PROCEDURE — 36415 COLL VENOUS BLD VENIPUNCTURE: CPT | Performed by: NURSE PRACTITIONER

## 2019-02-03 PROCEDURE — 99214 OFFICE O/P EST MOD 30 MIN: CPT | Performed by: NURSE PRACTITIONER

## 2019-02-03 PROCEDURE — 71101 X-RAY EXAM UNILAT RIBS/CHEST: CPT | Mod: LT

## 2019-02-03 PROCEDURE — 72080 X-RAY EXAM THORACOLMB 2/> VW: CPT

## 2019-02-03 RX ORDER — BENZONATATE 200 MG/1
200 CAPSULE ORAL AT BEDTIME
Qty: 7 CAPSULE | Refills: 0 | Status: SHIPPED | OUTPATIENT
Start: 2019-02-03 | End: 2019-04-13

## 2019-02-03 RX ORDER — PREDNISONE 20 MG/1
20 TABLET ORAL 2 TIMES DAILY
Qty: 10 TABLET | Refills: 0 | Status: SHIPPED | OUTPATIENT
Start: 2019-02-03 | End: 2019-02-08

## 2019-02-03 NOTE — PROGRESS NOTES
SUBJECTIVE:   Catherine Ferreira is a 73 year old female presenting with a chief complaint of cough.   Onset of symptoms was 1 month(s) ago.  Course of illness is worsening.    Severity moderate  Also reports coughing so hard posterior left ribs painful and middle back, fell and landed on chair    Past Medical History:   Diagnosis Date     Asthma      Cataract      GERD (gastroesophageal reflux disease)      Glaucoma      Hiatal hernia      hx of ITP      Hyperlipidemia      Hypertension      Obesity      Osteoarthritis      Current Outpatient Medications   Medication Sig Dispense Refill     albuterol (PROVENTIL) (2.5 MG/3ML) 0.083% neb solution TAKE 1 VIAL BY NEBULIZATION EVERY 6 HOURS AS NEEDED FOR SHORTNESS OF BREATH, DYSPNEA OR WHEEZING 75 mL 1     albuterol (VENTOLIN HFA) 108 (90 Base) MCG/ACT inhaler INHALE 2 PUFFS BY MOUTH FOUR TIMES DAILY AS NEEDED FOR WHEEZING 3 Inhaler 2     amLODIPine (NORVASC) 5 MG tablet TAKE 1 TABLET BY MOUTH DAILY FOR BLOOD PRESSURE 90 tablet 3     atorvastatin (LIPITOR) 80 MG tablet Take 1 tablet (80 mg) by mouth daily For cholesterol this is double dosage from before 90 tablet 3     blood glucose monitoring (NO BRAND SPECIFIED) meter device kit Use to test blood sugar 2 times daily or as directed. Dispense one touch or brand per insurance or pt preference. 1 kit 0     blood glucose monitoring (NO BRAND SPECIFIED) test strip Use to test blood sugars 2 times daily dispense one touch or brand per insurance or pt preference. 200 each 3     buPROPion (WELLBUTRIN SR) 150 MG 12 hr tablet Take 1 tablet (150 mg) by mouth daily (before lunch) For depression/energy and weight loss 90 tablet 1     Cholecalciferol (VITAMIN D) 2000 units tablet Take 2,000 Units by mouth daily 90 tablet 3     CYANOCOBALAMIN PO Take 1,000 mcg by mouth daily       fluticasone (FLONASE) 50 MCG/ACT nasal spray Spray 2 sprays into both nostrils daily as needed for rhinitis or allergies 16 g 11     fluticasone-salmeterol  (ADVAIR DISKUS) 500-50 MCG/DOSE inhaler Inhale 1 puff into the lungs 2 times daily For asthma control.Pharmacy ok to hold prescription until due 3 Inhaler 1     furosemide (LASIX) 20 MG tablet TAKE 2 TABLETS BY MOUTH DAILY WITH BREAKFAST FOR BLOOD PRESSURE 180 tablet 1     glipiZIDE (GLUCOTROL XL) 5 MG 24 hr tablet TAKE 1 TABLET BY MOUTH EVERY DAY WITH BREAKFAST for diabetes 90 tablet 1     ibuprofen (ADVIL,MOTRIN) 600 MG tablet Take 1 tablet (600 mg) by mouth every 6 hours as needed for moderate pain 60 tablet 0     ipratropium - albuterol 0.5 mg/2.5 mg/3 mL (DUONEB) 0.5-2.5 (3) MG/3ML nebulization Take 1 vial (3 mLs) by nebulization every 6 hours as needed for shortness of breath / dyspnea 30 vial 1     [employer] LANCETS MISC Use to test blood sugars 2 times daily dispense one touch brand or brand per patient preference. 200 each 1     lisinopril (PRINIVIL/ZESTRIL) 20 MG tablet TAKE 1 TABLET BY MOUTH 2 TIMES DAILY FOR BLOOD PRESSURE 180 tablet 1     MELATONIN PO Take 5 mg by mouth nightly as needed       metFORMIN (GLUCOPHAGE-XR) 500 MG 24 hr tablet TAKE 1 TABLET BY MOUTH TWICE DAILY WITH MEALS 180 tablet 0     metFORMIN (GLUCOPHAGE-XR) 500 MG 24 hr tablet TAKE 1 TABLET(500 MG) BY MOUTH TWICE DAILY WITH MEALS for diabetes 180 tablet 3     METOPROLOL SUCCINATE ER PO Take 25 mg by mouth daily       montelukast (SINGULAIR) 10 MG tablet Take 1 tablet (10 mg) by mouth daily For asthma/allergies Pharmacy ok to hold prescription until due 90 tablet 3     multivitamin, therapeutic with minerals (MULTI-VITAMIN) TABS Take 1 tablet by mouth daily       Omega-3 Fatty Acids (OMEGA-3 FISH OIL PO) Take 2 g by mouth daily       pantoprazole (PROTONIX) 40 MG EC tablet Take 1 tablet (40 mg) by mouth daily For heartburn. Take 30-60 minutes before a meal.Pharmacy ok to hold prescription until due 90 tablet 1     PARoxetine (PAXIL) 20 MG tablet Take 1 tablet (20 mg) by mouth At Bedtime For anxiety 90 tablet 1     polyethylene  glycol (MIRALAX) powder Take 17 g by mouth daily as needed for constipation 510 g 1     Social History     Tobacco Use     Smoking status: Never Smoker     Smokeless tobacco: Never Used   Substance Use Topics     Alcohol use: Yes     Comment: monthly, one beer       ROS:  CONSTITUTIONAL:NEGATIVE for fever, chills, change in weight  INTEGUMENTARY/SKIN: NEGATIVE for worrisome rashes, moles or lesions  EYES: NEGATIVE for vision changes or irritation  ENT/MOUTH: NEGATIVE for ear, mouth and throat problems  RESP:POSITIVE for cough-non productive, Hx asthma, SOB/dyspnea and wheezing  CV: NEGATIVE for chest pain, palpitations or peripheral edema  GI: NEGATIVE for nausea, abdominal pain, heartburn, or change in bowel habits  MUSCULOSKELETAL: Left posterior rib pain, middle back pain  NEURO: NEGATIVE for weakness, dizziness or paresthesias  ENDOCRINE: NEGATIVE for temperature intolerance, skin/hair changes  HEME/ALLERGY/IMMUNE: NEGATIVE for bleeding problems  PSYCHIATRIC: NEGATIVE for changes in mood or affect    OBJECTIVE:  /78   Pulse 82   Temp 99.2  F (37.3  C) (Tympanic)   Resp 28   Wt 142 kg (313 lb)   SpO2 93%   BMI 55.45 kg/m    GENERAL APPEARANCE: Alert and no distress  EYES: EOMI,  PERRL, conjunctiva clear  HENT: ear canals and TM's normal.  Nose and mouth without ulcers, erythema or lesions  NECK: supple, nontender, no lymphadenopathy  RESP: lungs clear to auscultation - no rales, rhonchi or wheezes. Tender posterior left ribs, no bruising swelling.   CV: regular rates and rhythm, normal S1 S2, no murmur noted  NEURO: Normal strength and tone, sensory exam grossly normal,  normal speech and mentation  SKIN: Middle back has slight ecchymosis, tender  Cervical, thoracic and lumbar spine exam no masses or kyphoscoliosis.   Full range of motion without pain is noted.    Component      Latest Ref Rng & Units 2/3/2019   WBC      4.0 - 11.0 10e9/L 10.5   RBC Count      3.8 - 5.2 10e12/L 4.55   Hemoglobin       11.7 - 15.7 g/dL 12.8   Hematocrit      35.0 - 47.0 % 41.6   MCV      78 - 100 fl 91   MCH      26.5 - 33.0 pg 28.1   MCHC      31.5 - 36.5 g/dL 30.8 (L)   RDW      10.0 - 15.0 % 15.0   Platelet Count      150 - 450 10e9/L 290   % Neutrophils      % 68.3   % Lymphocytes      % 19.5   % Monocytes      % 7.3   % Eosinophils      % 4.5   % Basophils      % 0.4   Absolute Neutrophil      1.6 - 8.3 10e9/L 7.2   Absolute Lymphocytes      0.8 - 5.3 10e9/L 2.1   Absolute Monocytes      0.0 - 1.3 10e9/L 0.8   Absolute Eosinophils      0.0 - 0.7 10e9/L 0.5   Absolute Basophils      0.0 - 0.2 10e9/L 0.0   Diff Method       Automated Method     Thoracic lumbar xray-IMPRESSION: Degenerative change. No evidence of acute fracture.  RIbs and left chest xray no abnormality seen, pending radiology read    ASSESSMENT:  (R05) Cough  (primary encounter diagnosis)    Plan:   Continue inhaler  benzonatate (TESSALON) 200 MG capsule prn for cough  Prednisone 20 mg BID X 5 days (will help with wheezing also back inflammation)  Can use ice heat on back ribs  Do deep breathing as discussed, home care given  Pulmonology referral placed.   Emergent symptoms as reviewed to ER  Not improving follow up with pcp      DOUGIE Mckinney CNP

## 2019-02-04 ENCOUNTER — TELEPHONE (OUTPATIENT)
Dept: FAMILY MEDICINE | Facility: CLINIC | Age: 74
End: 2019-02-04

## 2019-02-04 NOTE — TELEPHONE ENCOUNTER
PHQ-9 due now for patient ( 4-8 months from index date)  Index date:       9/25/18  Index PHQ9 :   17  FU start date:   1/25/19  FU End date :   5/25/19    RN- Contact patient for PHQ-9. Remission considered if follow up PHQ-9 less than 5.  If greater than 5 consider follow up appointment, e-visit for medication follow up and evaluation.

## 2019-02-05 ENCOUNTER — TELEPHONE (OUTPATIENT)
Dept: FAMILY MEDICINE | Facility: CLINIC | Age: 74
End: 2019-02-05

## 2019-02-05 NOTE — TELEPHONE ENCOUNTER
Per protocol, will route encounter to be cosigned by provider for Verbal Orders.  Agree. David Dotson MD  RN huddled with Dr. Dotson reviewing details of message below with provider.    Per [facility] per Dr. Dotson:  Decrease prednisone dose to 10mg by mouth twice daily for 3 days, then stop prednisone.  Take one extra 5mg glipizide (glucotrol) tablet today (10mg total), and each day until prednisone therapy completed.  Stop use of Gatorade and drink water instead, or sugar-free [employer] brand beverage.  Recheck BG 2 hours after last meal (3pm)  If BG is >500 call 911 or another  to transport pt to ED.    Pt notified of provider plan above as written. Pt also instructed if new or worsening sx develop, or pt develops dizziness/increased fatigue/lightheadedness, vomiting, call 911 for evaluation in ED. Confirmed pt has clinic number for additional questions or concerns. Pt indicates understanding of issues and agrees with the plan. Pt states she wrote down the instructions from provider as documented above.    Nazia Mcnamara RN

## 2019-02-05 NOTE — TELEPHONE ENCOUNTER
Call transferred to RN from scheduling line.    Catherine Ferreira is a 73 year old female who calls with elevated BG.    Pt states she went to bed at 5am this morning.  When pt woke up this morning she checked her BG at 9:30am and it was 221 before breakfast.  Pt took glipizide at 10:30am, and metformin at 12:30pm.  Pt does not use insulin.  Pt states she resides at [facility].  Pt ate instant oatmeal with dried fruit in it and hot chocolate, and then checked BG at 1:15pm when finished eating. BG after eating was 421.   Pt states she has been drinking a lot of Gatorade since urgent care visit 2/3/19. Pt states she didn't think Gatorade would affect her BG and would help her recover from her viral respiratory illness faster.  Pt feels fatigued since onset of viral illness. Pt states this baseline fatigue over the past week since onset of respiratory illness is not worsening.  Pt states she has been on Prednisone 20mg daily since 2/3/19.      NURSING ASSESSMENT:  Description:  See above. Pt inquiring what to do about her elevated BG readings.  Pt states she is light-headed when coughing. Pt states she has been tired all week since onset of respiratory illness and states she is not more tired today than yesterday.    DENIES: Diarrhea, vision change, vomiting    Last exam/Treatment:  2/3/19  Allergies:   Allergies   Allergen Reactions     Asa [Aspirin]      History of low platelets, told to avoid asa     Augmentin Hives     Levaquin Hives     Penicillins Other (See Comments)     Unsure of reaction       NURSING PLAN: Nursing advice to patient If unknown whether or not Gatorade formulation is sugar-free, discontinue Gatorade and drink water instead at this time due to elevated BG. Check BG 2 hours after last meal (3pm). If dizziness, lightheadedness, or worsening fatigue call 911. Await provider response.    RECOMMENDED DISPOSITION:   Will comply with recommendation: Yes  If further  questions/concerns or if symptoms do not improve, worsen or new symptoms develop, call your PCP or [facility] as soon as possible.      Guideline used:  Telephone Triage Protocols for Nurses, Fifth Edition, Brandy Barrientos (Diabetes Problem).   Routed to provider to review and advise.    Nazia Mcnamara RN

## 2019-02-08 ENCOUNTER — PATIENT OUTREACH (OUTPATIENT)
Dept: CARE COORDINATION | Facility: CLINIC | Age: 74
End: 2019-02-08

## 2019-02-08 ASSESSMENT — PATIENT HEALTH QUESTIONNAIRE - PHQ9: SUM OF ALL RESPONSES TO PHQ QUESTIONS 1-9: 0

## 2019-02-08 NOTE — PROGRESS NOTES
Clinic Care Coordination Contact  [facility]/Voicemail    Patient was seen at [facility] ER 2/6/19 for right knee pain, sob, and chest discomfort.   Discharging MD: Asa Torres MD - 02/06/2019  Take OTC pain medication as instructed (acetaminophen 1000 mg every 6 hours). Start taking blood thinner as previously directed. Avoid falls; use your walker. Follow up with your doctor this week to re-evaluate. Return immediately if any worsening or new symptom concerns  Diagnosed with chest wall pain and arthralgia.     Of note, patient is overdue to see pcp. Patients last OV was 9/25/18-patient to follow up in 3 months HTN-no pending OV    Patient seeing [facility] heart and vascular for atrial flutter.     Clinical Data: Care Coordinator Outreach  Outreach attempted x 1.  Left message on [employer] with call back information and requested return call.    Plan:   1. Care Coordinator will mail out care coordination introduction letter with care coordinator contact information and explanation of care coordination services.   2. Care Coordinator will try to reach patient again in 1-2 business days.    TINO Garcia RN Clinic Care Coordinator   [place], [place], [place]  Phone: [phone]

## 2019-02-08 NOTE — LETTER
Health Care Home - Access Care Plan    About Me  Patient Name:  Catherine Ferreira    YOB: 1945  Age:                             73 year old   Indra MRN:            [id number] Telephone Information:   Home Phone [phone]   Mobile [phone]       Address:    [address] [place] [address] Email address:  No e-mail address on record      Emergency Contact(s)  Name Relationship Lgl Grd Work Phone Home Phone Mobile Phone   1. AVERYKAYDEN* Daughter No none [phone]   2. NO SECONDARY C* Other  None none none             Health Maintenance: Routine Health maintenance Reviewed: Not assessed    My Access Plan  Medical Emergency 911   Questions or concerns during clinic hours Primary Clinic Line, I will call the clinic directly: [facility] - [phone]   24 Hour Appointment Line [phone] or  [phone]) (toll free)   24 Hour Nurse Line [phone] (toll free)   Questions or concerns outside clinic hours 24 Hour Appointment Line, I will call the after-hours on-call line:   [facility] [phone] or [phone] ([phone]) (toll-free)   Preferred Urgent Care [facility], [phone]   Preferred Hospital [facility]  [phone]   Preferred Pharmacy [facility] [address] [place] [address] [address] [facility]     Behavioral Health Crisis Line The National Suicide Prevention Lifeline at 1-281.845.6796 or 911     My Care Team Members  Patient Care Team       Relationship Specialty Notifications Start End    David Dotson MD PCP - General Family Practice  1/24/11     Phone: [phone] Fax: [phone] [address] [facility] [address]    David Dotson MD PCP - Assigned PCP   12/27/15     Phone: [phone] Fax: [phone] [address] [facility] [address]    Opal Presley RN Clinic Care Coordinator Primary Care - CC  Admissions 2/8/19     Phone: [phone] Fax: [phone]               My Medical and Care Information  Problem List   Patient Active Problem List   Diagnosis     Moderate persistent asthma     GERD (gastroesophageal reflux disease)     Hyperlipidemia with target LDL less than 100     Exogenous obesity     Hypertension goal BP (blood pressure) < 140/90     Advanced directives, counseling/discussion     Mixed anxiety depressive disorder     Pacemaker     Type 2 diabetes, HbA1C goal < 8% (H)     Tendency toward bleeding easily (H)     Chronic rhinitis     CHF (congestive heart failure), NYHA class I (H)     Health Care Home     Urinary incontinence     Straining to void     Rectocele     Cystocele, midline     Allergic rhinitis     Shingles     Constipation     Abdominal pain, unspecified abdominal location     Type 2 diabetes mellitus with diabetic chronic kidney disease     Other constipation     Gastroesophageal reflux disease without esophagitis     Major depressive disorder, recurrent episode, moderate (H)     CHF (congestive heart failure), NYHA class I, unspecified failure chronicity, combined (H)     Type 2 diabetes mellitus with diabetic chronic kidney disease, unspecified CKD stage     Essential hypertension with goal blood pressure less than 140/90     TEX (generalized anxiety disorder)     Type 2 diabetes mellitus with chronic kidney disease on chronic dialysis, unspecified long term insulin use status     Gastroesophageal reflux disease with esophagitis     Moderate persistent asthma without complication     Fibromyalgia     BMI 50.0-59.9, adult (H)     Vitamin D deficiency

## 2019-02-08 NOTE — PATIENT INSTRUCTIONS
Discharged home with self care on 2/7/19 from [facility]  
Elevated troponin
Elevated troponin

## 2019-02-08 NOTE — LETTER
[facility] CARE COORDINATION  [address] [facility] [address]    February 11, 2019    Catherine Ferreira  [address] [address]      Dear Catherine,    I am a clinic care coordinator who works with David Dotson MD at [facility]. I wanted to introduce myself and provide you with my contact information so that you can call me with questions or concerns about your health care. Below is a description of clinic care coordination and how I can further assist you.     The clinic care coordinator is a registered nurse and/or  who understand the health care system. The goal of clinic care coordination is to help you manage your health and improve access to the [facility] system in the most efficient manner. The registered nurse can assist you in meeting your health care goals by providing education, coordinating services, and strengthening the communication among your providers. The  can assist you with financial, behavioral, psychosocial, chemical dependency, counseling, and/or psychiatric resources.    Please feel free to contact me at [phone], with any questions or concerns. We at [facility] are focused on providing you with the highest-quality healthcare experience possible and that all starts with you.     Sincerely,   NIKI GarciaN RN Clinic Care Coordinator   [facility], [place], [place]  Phone: [phone]     Enclosed: I have enclosed a copy of a 24 Hour Access Plan. This has helpful phone numbers for you to call when needed. Please keep this in an easy to access place to use as needed.

## 2019-02-11 NOTE — PROGRESS NOTES
Clinic Care Coordination Contact  [facility]/Voicemail    Referral Source: [facility]  Clinical Data: Care Coordinator Outreach    Outreach attempted x 2.  Left message on voicemail with call back information and requested return call.  Plan:   1. Care Coordinator mailed out care coordination introduction letter on 2/11/19.   2. Care Coordinator will do no further outreaches at this time.    TINO Garcia RN Clinic Care Coordinator   [place], [place], [place]  Phone: [phone]

## 2019-02-19 ENCOUNTER — TELEPHONE (OUTPATIENT)
Dept: FAMILY MEDICINE | Facility: CLINIC | Age: 74
End: 2019-02-19

## 2019-02-19 DIAGNOSIS — E11.22 TYPE 2 DIABETES MELLITUS WITH CHRONIC KIDNEY DISEASE ON CHRONIC DIALYSIS (H): ICD-10-CM

## 2019-02-19 DIAGNOSIS — N18.6 TYPE 2 DIABETES MELLITUS WITH CHRONIC KIDNEY DISEASE ON CHRONIC DIALYSIS (H): ICD-10-CM

## 2019-02-19 DIAGNOSIS — N18.1 TYPE 2 DIABETES MELLITUS WITH STAGE 1 CHRONIC KIDNEY DISEASE, WITHOUT LONG-TERM CURRENT USE OF INSULIN (H): ICD-10-CM

## 2019-02-19 DIAGNOSIS — J45.40 MODERATE PERSISTENT ASTHMA WITHOUT COMPLICATION: ICD-10-CM

## 2019-02-19 DIAGNOSIS — I10 ESSENTIAL HYPERTENSION WITH GOAL BLOOD PRESSURE LESS THAN 140/90: ICD-10-CM

## 2019-02-19 DIAGNOSIS — E78.5 HYPERLIPIDEMIA WITH TARGET LDL LESS THAN 100: ICD-10-CM

## 2019-02-19 DIAGNOSIS — K21.9 GASTROESOPHAGEAL REFLUX DISEASE WITHOUT ESOPHAGITIS: ICD-10-CM

## 2019-02-19 DIAGNOSIS — F41.1 GAD (GENERALIZED ANXIETY DISORDER): ICD-10-CM

## 2019-02-19 DIAGNOSIS — I50.40 CHF (CONGESTIVE HEART FAILURE), NYHA CLASS I, UNSPECIFIED FAILURE CHRONICITY, COMBINED (H): ICD-10-CM

## 2019-02-19 DIAGNOSIS — J45.41 MODERATE PERSISTENT ASTHMA WITH ACUTE EXACERBATION: ICD-10-CM

## 2019-02-19 DIAGNOSIS — E11.22 TYPE 2 DIABETES MELLITUS WITH STAGE 1 CHRONIC KIDNEY DISEASE, WITHOUT LONG-TERM CURRENT USE OF INSULIN (H): ICD-10-CM

## 2019-02-19 DIAGNOSIS — F33.1 MAJOR DEPRESSIVE DISORDER, RECURRENT EPISODE, MODERATE (H): ICD-10-CM

## 2019-02-19 DIAGNOSIS — E55.9 VITAMIN D DEFICIENCY: ICD-10-CM

## 2019-02-19 DIAGNOSIS — Z99.2 TYPE 2 DIABETES MELLITUS WITH CHRONIC KIDNEY DISEASE ON CHRONIC DIALYSIS (H): ICD-10-CM

## 2019-02-19 RX ORDER — ALBUTEROL SULFATE 90 UG/1
AEROSOL, METERED RESPIRATORY (INHALATION)
Qty: 3 INHALER | Refills: 0 | Status: SHIPPED | OUTPATIENT
Start: 2019-02-19 | End: 2020-05-04

## 2019-02-19 RX ORDER — FUROSEMIDE 20 MG
TABLET ORAL
Qty: 180 TABLET | Refills: 0 | Status: SHIPPED | OUTPATIENT
Start: 2019-02-19 | End: 2019-06-13

## 2019-02-19 RX ORDER — BUPROPION HYDROCHLORIDE 150 MG/1
150 TABLET, EXTENDED RELEASE ORAL
Qty: 90 TABLET | Refills: 0 | Status: SHIPPED | OUTPATIENT
Start: 2019-02-19 | End: 2019-06-13

## 2019-02-19 RX ORDER — CHOLECALCIFEROL (VITAMIN D3) 50 MCG
2000 TABLET ORAL DAILY
Qty: 90 TABLET | Refills: 1 | Status: SHIPPED | OUTPATIENT
Start: 2019-02-19 | End: 2020-12-18

## 2019-02-19 RX ORDER — LISINOPRIL 20 MG/1
TABLET ORAL
Qty: 180 TABLET | Refills: 0 | Status: SHIPPED | OUTPATIENT
Start: 2019-02-19 | End: 2019-06-13

## 2019-02-19 RX ORDER — PAROXETINE 20 MG/1
20 TABLET, FILM COATED ORAL AT BEDTIME
Qty: 90 TABLET | Refills: 0 | Status: SHIPPED | OUTPATIENT
Start: 2019-02-19 | End: 2019-06-04

## 2019-02-19 RX ORDER — METFORMIN HCL 500 MG
TABLET, EXTENDED RELEASE 24 HR ORAL
Qty: 180 TABLET | Refills: 0 | Status: SHIPPED | OUTPATIENT
Start: 2019-02-19 | End: 2019-06-04

## 2019-02-19 RX ORDER — MONTELUKAST SODIUM 10 MG/1
1 TABLET ORAL DAILY
Qty: 90 TABLET | Refills: 0 | Status: SHIPPED | OUTPATIENT
Start: 2019-02-19 | End: 2019-06-13

## 2019-02-19 RX ORDER — ATORVASTATIN CALCIUM 80 MG/1
80 TABLET, FILM COATED ORAL DAILY
Qty: 90 TABLET | Refills: 0 | Status: SHIPPED | OUTPATIENT
Start: 2019-02-19 | End: 2019-06-13

## 2019-02-19 RX ORDER — PANTOPRAZOLE SODIUM 40 MG/1
40 TABLET, DELAYED RELEASE ORAL DAILY
Qty: 90 TABLET | Refills: 1 | Status: SHIPPED | OUTPATIENT
Start: 2019-02-19 | End: 2020-12-18

## 2019-02-19 RX ORDER — AMLODIPINE BESYLATE 5 MG/1
TABLET ORAL
Qty: 90 TABLET | Refills: 0 | Status: SHIPPED | OUTPATIENT
Start: 2019-02-19 | End: 2019-06-13

## 2019-02-19 RX ORDER — GLIPIZIDE 5 MG/1
TABLET, FILM COATED, EXTENDED RELEASE ORAL
Qty: 90 TABLET | Refills: 0 | Status: SHIPPED | OUTPATIENT
Start: 2019-02-19 | End: 2019-06-13

## 2019-02-19 NOTE — TELEPHONE ENCOUNTER
Patient informed of medications sent to [facility]  Instructed patient due for recheck in March with Dr.Mann Mattie PRINCEN, RN, CPN

## 2019-02-19 NOTE — TELEPHONE ENCOUNTER
Patient is requesting that ALL of her prescriptions [facility].  Please call patient with questions.  Thank you

## 2019-02-23 ENCOUNTER — NURSE TRIAGE (OUTPATIENT)
Dept: NURSING | Facility: CLINIC | Age: 74
End: 2019-02-23

## 2019-02-23 NOTE — TELEPHONE ENCOUNTER
"\"My blood pressure is low. It's 123/61. \" denies other sx. Is taking blood pressure medications as directed. Advised this is a normal blood pressure. If you develop sx , call back. Also gave blood pressure that would advised ER. Pt agrees with plan.      Additional Information    Negative: Started suddenly after an allergic medicine, an allergic food, or bee sting    Negative: Shock suspected (e.g., cold/pale/clammy skin, too weak to stand, low BP, rapid pulse)    Negative: Difficult to awaken or acting confused  (e.g., disoriented, slurred speech)    Negative: Fainted    Negative: [1] Systolic BP < 90 AND [2] dizzy, lightheaded, or weak    Negative: Chest pain    Negative: Bleeding (e.g., vomiting blood, rectal bleeding or tarry stools, severe vaginal bleeding)(Exception: fainted from sight of small amount of blood; small cut or abrasion)    Negative: Extra heart beats or heart is beating fast  (i.e., \"palpitations\")    Negative: Sounds like a life-threatening emergency to the triager    Negative: [1] Systolic BP < 80 AND [2] NOT dizzy, lightheaded or weak    Negative: Abdominal pain    Negative: Fever > 100.5 F (38.1 C)    Negative: Major surgery in the past month    Negative: [1] Drinking very little AND [2] dehydration suspected (e.g., no urine > 12 hours, very dry mouth, very lightheaded)    Negative: [1] Fall in systolic BP > 20 mm Hg from normal AND [2] dizzy, lightheaded, or weak    Negative: Patient sounds very sick or weak to the triager    Negative: [1] Systolic BP < 90 AND [2] NOT dizzy, lightheaded or weak    Negative: [1] Systolic BP  AND [2] taking blood pressure medications AND [3] NOT dizzy, lightheaded or weak    Negative: [1] Fall in systolic BP > 20 mm Hg from normal AND [2] NOT dizzy, lightheaded, or weak  (all triage questions negative)    Negative: Fall in systolic BP > 20 mmHg after standing up    Negative: Fall in systolic BP > 20 mmHg after eating a meal    Negative: Diastolic BP < 50 " mm Hg    Negative: Brief (now gone) dizziness or lightheadedness after standing up or eating    Negative: Wants doctor to measure BP    Protocols used: LOW BLOOD PRESSURE-ADULT-AH

## 2019-02-25 ENCOUNTER — TELEPHONE (OUTPATIENT)
Dept: FAMILY MEDICINE | Facility: CLINIC | Age: 74
End: 2019-02-25

## 2019-02-25 NOTE — TELEPHONE ENCOUNTER
Pt states she was given oxygen when she wall ill in the hospital months ago.  She used it for a short period of time then was better and did not need it.  It has been just sitting in her home. Pt states she checks her oxygen saturation levels at home and they are usually 94-96 %.  Pt requesting letter saying it is ok to discontinue use of oxygen and supply company can come  equipment.  Farida PRINCEN, RN

## 2019-02-25 NOTE — LETTER
[facility]  [address] [facility] [address]  Phone: [phone]    February 25, 2019        Catherine Ferreira  [address] [address]          To whom it may concern:    RE: Catherine Ferreira    Patient was seen and treated at our clinic. Ok to stop home oxygen because not needed.     Please contact me for questions or concerns.      Sincerely,        David Dotson MD

## 2019-02-25 NOTE — TELEPHONE ENCOUNTER
Patient is calling to ask pcp to send a letter to medical supply where she had her oxygen equipment from that she can discontinue using this equipment.     Please fax this letter/note to [phone]    Thank you

## 2019-03-05 NOTE — TELEPHONE ENCOUNTER
Patient called back with a different fax. Please fax the order to discontinue oxygen to [phone]. Ok to leave a detailed message. Thank you

## 2019-03-05 NOTE — TELEPHONE ENCOUNTER
Faxed note to new fax number provided by the patient. Called and informd patient.Tamia Butt MA/RAYSHAWN

## 2019-04-01 ENCOUNTER — TELEPHONE (OUTPATIENT)
Dept: FAMILY MEDICINE | Facility: CLINIC | Age: 74
End: 2019-04-01

## 2019-04-01 NOTE — TELEPHONE ENCOUNTER
Patient is calling to request an appointment with Dr. Dotson sooner than Monday 4/8/2019.  She states that she is feeling terrible.  Please call to schedule.  Thank you

## 2019-04-13 ENCOUNTER — OFFICE VISIT (OUTPATIENT)
Dept: URGENT CARE | Facility: URGENT CARE | Age: 74
End: 2019-04-13
Payer: COMMERCIAL

## 2019-04-13 ENCOUNTER — ANCILLARY PROCEDURE (OUTPATIENT)
Dept: GENERAL RADIOLOGY | Facility: CLINIC | Age: 74
End: 2019-04-13
Attending: PHYSICIAN ASSISTANT
Payer: COMMERCIAL

## 2019-04-13 VITALS
RESPIRATION RATE: 23 BRPM | BODY MASS INDEX: 57.64 KG/M2 | HEART RATE: 71 BPM | WEIGHT: 293 LBS | SYSTOLIC BLOOD PRESSURE: 146 MMHG | DIASTOLIC BLOOD PRESSURE: 78 MMHG | OXYGEN SATURATION: 92 % | TEMPERATURE: 98.5 F

## 2019-04-13 DIAGNOSIS — J45.41 MODERATE PERSISTENT ASTHMA WITH EXACERBATION: Primary | ICD-10-CM

## 2019-04-13 PROCEDURE — 96372 THER/PROPH/DIAG INJ SC/IM: CPT | Performed by: PHYSICIAN ASSISTANT

## 2019-04-13 PROCEDURE — 99214 OFFICE O/P EST MOD 30 MIN: CPT | Mod: 25 | Performed by: PHYSICIAN ASSISTANT

## 2019-04-13 PROCEDURE — 71046 X-RAY EXAM CHEST 2 VIEWS: CPT

## 2019-04-13 RX ORDER — METHYLPREDNISOLONE SOD SUCC 125 MG
125 VIAL (EA) INJECTION ONCE
Status: COMPLETED | OUTPATIENT
Start: 2019-04-13 | End: 2019-04-13

## 2019-04-13 RX ORDER — AZITHROMYCIN 250 MG/1
TABLET, FILM COATED ORAL
Qty: 6 TABLET | Refills: 1 | Status: SHIPPED | OUTPATIENT
Start: 2019-04-13 | End: 2019-06-04

## 2019-04-13 RX ORDER — PREDNISONE 20 MG/1
TABLET ORAL
Qty: 20 TABLET | Refills: 0 | Status: SHIPPED | OUTPATIENT
Start: 2019-04-13 | End: 2019-04-25

## 2019-04-13 RX ORDER — BENZONATATE 200 MG/1
200 CAPSULE ORAL 3 TIMES DAILY PRN
Qty: 30 CAPSULE | Refills: 0 | Status: SHIPPED | OUTPATIENT
Start: 2019-04-13 | End: 2019-04-23

## 2019-04-13 RX ADMIN — Medication 125 MG: at 11:57

## 2019-04-13 ASSESSMENT — ENCOUNTER SYMPTOMS
HEMOPTYSIS: 0
PALPITATIONS: 0
CONSTITUTIONAL NEGATIVE: 1
SORE THROAT: 0
WHEEZING: 1
SHORTNESS OF BREATH: 1
SPUTUM PRODUCTION: 1
EYE PAIN: 0
GASTROINTESTINAL NEGATIVE: 1
COUGH: 1
WEIGHT LOSS: 0
FEVER: 0
CARDIOVASCULAR NEGATIVE: 1
DIAPHORESIS: 0

## 2019-04-13 NOTE — PROGRESS NOTES
SUBJECTIVE:      HPI  Catherine Ferreira is a 73 year old female who presents to clinic today for the following health issues:  RESPIRATORY SYMPTOMS    Duration: 3months, on and off. Has been seen twice in the past 3months for same symptoms.  Has been given 2 rounds of tapering prednisone and doxy P15utke with only temporary relief.  CXR was negative.  Continues to do her inhalers and nebs with some relief.    Description  Productive cough, shortness of breath and wheezing but no hemoptysis    Severity: moderate    Accompanying signs and symptoms:  No sore throat or sinus congestion/pain/pressure.  No abdominal pain, n/v, constipation, diarrhea, bloody or black tarry stools.  No fever, chills or sweats.    History (predisposing factors):  Asthma, no ill contacts, non-smoker    Precipitating or alleviating factors: None    Therapies tried and outcome:  rest and fluids guaifenesin, antibiotics, prednisone with only temporary relief    Reviewed PMH, FMH and SOH.  Patient Active Problem List   Diagnosis     Moderate persistent asthma     GERD (gastroesophageal reflux disease)     Hyperlipidemia with target LDL less than 100     Exogenous obesity     Hypertension goal BP (blood pressure) < 140/90     Advanced directives, counseling/discussion     Mixed anxiety depressive disorder     Pacemaker     Type 2 diabetes, HbA1C goal < 8% (H)     Tendency toward bleeding easily (H)     Chronic rhinitis     CHF (congestive heart failure), NYHA class I (H)     Health Care Home     Urinary incontinence     Straining to void     Rectocele     Cystocele, midline     Allergic rhinitis     Shingles     Constipation     Abdominal pain, unspecified abdominal location     Type 2 diabetes mellitus with diabetic chronic kidney disease     Other constipation     Gastroesophageal reflux disease without esophagitis     Major depressive disorder, recurrent episode, moderate (H)     CHF (congestive heart failure), NYHA class I, unspecified failure  chronicity, combined (H)     Type 2 diabetes mellitus with diabetic chronic kidney disease, unspecified CKD stage     Essential hypertension with goal blood pressure less than 140/90     TEX (generalized anxiety disorder)     Type 2 diabetes mellitus with chronic kidney disease on chronic dialysis, unspecified long term insulin use status     Gastroesophageal reflux disease with esophagitis     Moderate persistent asthma without complication     Fibromyalgia     BMI 50.0-59.9, adult (H)     Vitamin D deficiency     Current Outpatient Medications   Medication Sig Dispense Refill     albuterol (PROVENTIL) (2.5 MG/3ML) 0.083% neb solution TAKE 1 VIAL BY NEBULIZATION EVERY 6 HOURS AS NEEDED FOR SHORTNESS OF BREATH, DYSPNEA OR WHEEZING 75 mL 1     albuterol (VENTOLIN HFA) 108 (90 Base) MCG/ACT inhaler INHALE 2 PUFFS BY MOUTH FOUR TIMES DAILY AS NEEDED FOR WHEEZING 3 Inhaler 0     amLODIPine (NORVASC) 5 MG tablet TAKE 1 TABLET BY MOUTH DAILY FOR BLOOD PRESSURE 90 tablet 0     atorvastatin (LIPITOR) 80 MG tablet Take 1 tablet (80 mg) by mouth daily For cholesterol this is double dosage from before 90 tablet 0     blood glucose monitoring (NO BRAND SPECIFIED) meter device kit Use to test blood sugar 2 times daily or as directed. Dispense one touch or brand per insurance or pt preference. 1 kit 0     blood glucose monitoring (NO BRAND SPECIFIED) test strip Use to test blood sugars 2 times daily dispense one touch or brand per insurance or pt preference. 200 each 3     buPROPion (WELLBUTRIN SR) 150 MG 12 hr tablet Take 1 tablet (150 mg) by mouth daily (before lunch) For depression/energy and weight loss 90 tablet 0     CYANOCOBALAMIN PO Take 1,000 mcg by mouth daily       fluticasone (FLONASE) 50 MCG/ACT nasal spray Spray 2 sprays into both nostrils daily as needed for rhinitis or allergies 16 g 11     fluticasone-salmeterol (ADVAIR DISKUS) 500-50 MCG/DOSE inhaler Inhale 1 puff into the lungs 2 times daily For asthma  control.P 3 Inhaler 0     furosemide (LASIX) 20 MG tablet TAKE 2 TABLETS BY MOUTH DAILY WITH BREAKFAST FOR BLOOD PRESSURE 180 tablet 0     glipiZIDE (GLUCOTROL XL) 5 MG 24 hr tablet TAKE 1 TABLET BY MOUTH EVERY DAY WITH BREAKFAST for diabetes 90 tablet 0     ibuprofen (ADVIL,MOTRIN) 600 MG tablet Take 1 tablet (600 mg) by mouth every 6 hours as needed for moderate pain 60 tablet 0     elicit FINEPOINT LANCETS MISC Use to test blood sugars 2 times daily dispense one touch brand or brand per patient preference. 200 each 1     lisinopril (PRINIVIL/ZESTRIL) 20 MG tablet TAKE 1 TABLET BY MOUTH 2 TIMES DAILY FOR BLOOD PRESSURE 180 tablet 0     MELATONIN PO Take 5 mg by mouth nightly as needed       metFORMIN (GLUCOPHAGE-XR) 500 MG 24 hr tablet TAKE 1 TABLET BY MOUTH TWICE DAILY WITH MEALS 180 tablet 0     metFORMIN (GLUCOPHAGE-XR) 500 MG 24 hr tablet TAKE 1 TABLET(500 MG) BY MOUTH TWICE DAILY WITH MEALS for diabetes 180 tablet 3     METOPROLOL SUCCINATE ER PO Take 25 mg by mouth daily       montelukast (SINGULAIR) 10 MG tablet Take 1 tablet (10 mg) by mouth daily For asthma/allergies 90 tablet 0     multivitamin, therapeutic with minerals (MULTI-VITAMIN) TABS Take 1 tablet by mouth daily       Omega-3 Fatty Acids (OMEGA-3 FISH OIL PO) Take 2 g by mouth daily       pantoprazole (PROTONIX) 40 MG EC tablet Take 1 tablet (40 mg) by mouth daily For heartburn. Take 30-60 minutes before a meal. 90 tablet 1     PARoxetine (PAXIL) 20 MG tablet Take 1 tablet (20 mg) by mouth At Bedtime For anxiety 90 tablet 0     polyethylene glycol (MIRALAX) powder Take 17 g by mouth daily as needed for constipation 510 g 1     vitamin D3 (CHOLECALCIFEROL) 2000 units tablet Take 1 tablet by mouth daily 90 tablet 1     ipratropium - albuterol 0.5 mg/2.5 mg/3 mL (DUONEB) 0.5-2.5 (3) MG/3ML nebulization Take 1 vial (3 mLs) by nebulization every 6 hours as needed for shortness of breath / dyspnea 30 vial 1     Allergies   Allergen Reactions     Asa  [Aspirin]      History of low platelets, told to avoid asa     Augmentin Hives     Chromium Other (See Comments)     ear infection     Levaquin Hives     Penicillins Other (See Comments)     Unsure of reaction       Review of Systems   Constitutional: Negative.  Negative for diaphoresis, fever and weight loss.   HENT: Negative.  Negative for congestion, ear pain and sore throat.    Eyes: Negative for pain.   Respiratory: Positive for cough, sputum production, shortness of breath and wheezing. Negative for hemoptysis.    Cardiovascular: Negative.  Negative for chest pain and palpitations.   Gastrointestinal: Negative.    Skin: Negative.    Endo/Heme/Allergies: Negative for environmental allergies.   All other systems reviewed and are negative.      /78   Pulse 71   Temp 98.5  F (36.9  C) (Tympanic)   Resp 23   Wt 147.6 kg (325 lb 6.4 oz)   SpO2 92%   BMI 57.64 kg/m    Physical Exam   Constitutional: She is oriented to person, place, and time. She appears well-developed and well-nourished. No distress.   Morbidly obese   HENT:   Head: Normocephalic and atraumatic.   Nose: Nose normal.   Mouth/Throat: Uvula is midline, oropharynx is clear and moist and mucous membranes are normal. No oropharyngeal exudate or posterior oropharyngeal erythema. No tonsillar exudate.   TMs are intact without any erythema or bulging bilaterally.  Airway is patent.   Eyes: Pupils are equal, round, and reactive to light. Conjunctivae and EOM are normal. No scleral icterus.   Neck: Normal range of motion. Neck supple. No thyromegaly present.   Cardiovascular: Normal rate, regular rhythm, normal heart sounds and intact distal pulses. Exam reveals no gallop and no friction rub.   No murmur heard.  Pulmonary/Chest: Effort normal and breath sounds normal. No accessory muscle usage. No respiratory distress. She has no decreased breath sounds. She has no wheezes. She has no rhonchi. She has no rales.   Lymphadenopathy:     She has no  cervical adenopathy.   Neurological: She is alert and oriented to person, place, and time.   Skin: Skin is warm and dry. No cyanosis. Nails show no clubbing.   Psychiatric: She has a normal mood and affect. Judgment normal.   Nursing note and vitals reviewed.  CXR PA/lateral:  Pacemaker present.  No infiltrates, effusions or pneumothorax.  No suspicious nodules or lesions. No fractures.  Per my read.   Will send for overread.        Assessment/Plan:  Moderate persistent asthma with exacerbation:  CXR was negative for pneumonia.  She has failed doxy and tapering prednisone.  Solumedrol inj was given in clinic.  Declined neb treatment due to recent neb at home.  Will treat with zithromax X5days, tessalon perles, and tapering prednisone.  Continue with inhalers and home nebs.  Recommend treatment with rest, fluids and chicken soup. Tylenol/ibuprofen prn fever/pain.  Will also send to asthma specialist for further evaluation and management.  Recheck in clinic if symptoms worsen or if symptoms do not improve.  To the ER if she develops hemoptysis, chest pain, fevers>102, worsening shortness of breath/wheezing.    -     methylPREDNISolone sodium succinate (solu-MEDROL) injection 125 mg  -     XR Chest 2 Views  -     azithromycin (ZITHROMAX) 250 MG tablet; 2 tablets the first day, then 1 tablet daily for the next 4 days  -     benzonatate (TESSALON) 200 MG capsule; Take 1 capsule (200 mg) by mouth 3 times daily as needed for cough  -     predniSONE (DELTASONE) 20 MG tablet; Take 60 mg by mouth daily for 3 days, THEN 40 mg daily for 3 days, THEN 20 mg daily for 3 days, THEN 10 mg daily for 3 days.  -     ALLERGY/ASTHMA ADULT REFERRAL          Shannen Pepper PA-C

## 2019-04-15 DIAGNOSIS — F41.1 GAD (GENERALIZED ANXIETY DISORDER): ICD-10-CM

## 2019-04-15 RX ORDER — PAROXETINE 20 MG/1
TABLET, FILM COATED ORAL
Qty: 90 TABLET | Refills: 0 | Status: SHIPPED | OUTPATIENT
Start: 2019-04-15 | End: 2019-07-11

## 2019-04-22 DIAGNOSIS — R79.0 ABNORMAL LEVEL OF BLOOD MINERAL: Primary | ICD-10-CM

## 2019-04-22 LAB
FERRITIN SERPL-MCNC: 130 NG/ML (ref 8–252)
IRON SATN MFR SERPL: 36 % (ref 15–46)
IRON SERPL-MCNC: 109 UG/DL (ref 35–180)
TIBC SERPL-MCNC: 304 UG/DL (ref 240–430)

## 2019-04-22 PROCEDURE — 83540 ASSAY OF IRON: CPT | Performed by: INTERNAL MEDICINE

## 2019-04-22 PROCEDURE — 83550 IRON BINDING TEST: CPT | Performed by: INTERNAL MEDICINE

## 2019-04-22 PROCEDURE — 82728 ASSAY OF FERRITIN: CPT | Performed by: INTERNAL MEDICINE

## 2019-04-22 PROCEDURE — 36415 COLL VENOUS BLD VENIPUNCTURE: CPT | Performed by: INTERNAL MEDICINE

## 2019-05-07 ENCOUNTER — TELEPHONE (OUTPATIENT)
Dept: FAMILY MEDICINE | Facility: CLINIC | Age: 74
End: 2019-05-07

## 2019-05-07 NOTE — TELEPHONE ENCOUNTER
Patient no showed last couple appointment and overdue for med check/labs. Will need md appointment/ urgent care visit today to address prednisone/breathing. David Dotson MD

## 2019-05-07 NOTE — TELEPHONE ENCOUNTER
Left message on patient;s voicemail that an appointment is needed today or urgent care visit.Tamia Butt MA/TC

## 2019-05-07 NOTE — TELEPHONE ENCOUNTER
PHQ-9 due now for patient ( 4-8 months from index date)  Index date:      9/25/18  Index PHQ9 :   17  FU start date:   1/24/19  FU End date :   5/24/19    RN- Contact patient for PHQ-9. Remission considered if follow up PHQ-9 less than 5.  If greater than 5 consider follow up appointment, e-visit for medication follow up and evaluation.

## 2019-05-16 ENCOUNTER — TELEPHONE (OUTPATIENT)
Dept: FAMILY MEDICINE | Facility: CLINIC | Age: 74
End: 2019-05-16

## 2019-05-16 NOTE — TELEPHONE ENCOUNTER
Catherine Ferreira is a 73 year old female who calls with swelling in her legs bilaterally.    NURSING ASSESSMENT:  Description:  Pt states for the last week she has had increased swelling in her legs bilaterally up to her knees.  She has slight occasional cough due to seasonal allergies. Pt does live in assisted living so maybe eating more salt than normal and is drinking more water recently. Pt does take furosemide 40 mg every morning. Pt denies any chest pain, sob, or redness. She is able to walk on legs.    Allergies:   Allergies   Allergen Reactions     Asa [Aspirin]      History of low platelets, told to avoid asa     Augmentin Hives     Chromium Other (See Comments)     ear infection     Levaquin Hives     Penicillins Other (See Comments)     Unsure of reaction     Pt advised if worsening or new sx go to ER.     She did schedule appointment with Dr. Dotson on 6/13/19, next available long appointment.  Pt asking if she can take a stronger diuretic.     Farida Silva RN

## 2019-05-16 NOTE — TELEPHONE ENCOUNTER
Patient is stating that her legs and feet are so swollen that her skin will not stretch any longer.  Please call patient to advise on what she should be doing.  Thank you

## 2019-05-16 NOTE — TELEPHONE ENCOUNTER
Pt notified of provider message as written.  Pt verbalized good understanding.  Farida Silva BSN, RN

## 2019-05-16 NOTE — TELEPHONE ENCOUNTER
Extra lasix x3 days and monitor weight. To ER if worsening swelling/ shortness of breath or rapid weight gain. David Dotson MD

## 2019-05-18 ENCOUNTER — OFFICE VISIT (OUTPATIENT)
Dept: URGENT CARE | Facility: URGENT CARE | Age: 74
End: 2019-05-18
Payer: COMMERCIAL

## 2019-05-18 VITALS
BODY MASS INDEX: 58.1 KG/M2 | SYSTOLIC BLOOD PRESSURE: 142 MMHG | HEART RATE: 80 BPM | DIASTOLIC BLOOD PRESSURE: 80 MMHG | WEIGHT: 293 LBS | TEMPERATURE: 98 F | OXYGEN SATURATION: 95 %

## 2019-05-18 DIAGNOSIS — Z53.9 ERRONEOUS ENCOUNTER--DISREGARD: Primary | ICD-10-CM

## 2019-05-23 ENCOUNTER — MEDICAL CORRESPONDENCE (OUTPATIENT)
Dept: HEALTH INFORMATION MANAGEMENT | Facility: CLINIC | Age: 74
End: 2019-05-23

## 2019-05-23 ENCOUNTER — TELEPHONE (OUTPATIENT)
Dept: FAMILY MEDICINE | Facility: CLINIC | Age: 74
End: 2019-05-23

## 2019-05-23 NOTE — TELEPHONE ENCOUNTER
Needing verbal orders for home care services, PT 2x week for 2 weeks, 1x week for 1 week, OT eval and treat, home health aide 1x week, skilled nursing assessment, dietician assessment, and an OK for patient to take Ropinirole .5mg daily added to home care medication list.    OK to LM

## 2019-05-24 RX ORDER — ROPINIROLE 0.5 MG/1
0.5 TABLET, FILM COATED ORAL
COMMUNITY
Start: 2019-05-24 | End: 2021-02-18

## 2019-05-24 NOTE — TELEPHONE ENCOUNTER
lLeft message on answering machine for nurse to call back to [phone]. Left verbal orders as requested on vm but need clarification that pt is taking one Requip daily or what dosing is so I can update pt [place] chart.  Farida PRINCEN, RN

## 2019-05-24 NOTE — TELEPHONE ENCOUNTER
Nurse called back and clarified pt takes one with supper.  Med list updated.  Farida Silva BSN, RN

## 2019-05-31 DIAGNOSIS — E78.5 HYPERLIPIDEMIA WITH TARGET LDL LESS THAN 100: ICD-10-CM

## 2019-06-03 RX ORDER — ATORVASTATIN CALCIUM 40 MG/1
TABLET, FILM COATED ORAL
Qty: 30 TABLET | Refills: 0 | Status: SHIPPED | OUTPATIENT
Start: 2019-06-03 | End: 2019-06-13

## 2019-06-04 ENCOUNTER — TELEPHONE (OUTPATIENT)
Dept: FAMILY MEDICINE | Facility: CLINIC | Age: 74
End: 2019-06-04

## 2019-06-04 DIAGNOSIS — E11.22 TYPE 2 DIABETES MELLITUS WITH CHRONIC KIDNEY DISEASE, WITHOUT LONG-TERM CURRENT USE OF INSULIN, UNSPECIFIED CKD STAGE (H): Primary | ICD-10-CM

## 2019-06-04 NOTE — TELEPHONE ENCOUNTER
Panel Management Review      Patient has the following on her problem list:   Diabetes    ASA: Failed    Last A1C  Lab Results   Component Value Date    A1C 7.3 09/18/2018    A1C 7.6 02/26/2018    A1C 9.2 06/05/2017    A1C 8.8 11/16/2016    A1C 7.3 05/17/2016     A1C tested: Passed    Last LDL:    Lab Results   Component Value Date    CHOL 232 02/26/2018     Lab Results   Component Value Date    HDL 51 02/26/2018     Lab Results   Component Value Date     02/26/2018     Lab Results   Component Value Date    TRIG 138 02/26/2018     Lab Results   Component Value Date    CHOLHDLRATIO 4.9 06/02/2014     Lab Results   Component Value Date    NHDL 181 02/26/2018       Is the patient on a Statin? YES             Is the patient on Aspirin? NO    Medications     HMG CoA Reductase Inhibitors     atorvastatin (LIPITOR) 40 MG tablet        atorvastatin (LIPITOR) 80 MG tablet             Last three blood pressure readings:  BP Readings from Last 3 Encounters:   05/18/19 142/80   04/13/19 146/78   02/03/19 140/78       Date of last diabetes office visit: 2018     Tobacco History:     History   Smoking Status     Never Smoker   Smokeless Tobacco     Never Used         Hypertension   Last three blood pressure readings:  BP Readings from Last 3 Encounters:   05/18/19 142/80   04/13/19 146/78   02/03/19 140/78     Blood pressure: FAILED    HTN Guidelines:  Less than 140/90      Composite cancer screening  Chart review shows that this patient is due/due soon for the following None  Summary:    Patient is due/failing the following:   ASA and BP CHECK    Action needed:   Patient needs office visit for she has an appt set up.    Type of outreach:    None, routed to provider for review.    Questions for provider review:    Catherine is not on asa. Please update med list or send to BELA Morley, CMA      Chart routed to Provider .

## 2019-06-06 ENCOUNTER — MEDICAL CORRESPONDENCE (OUTPATIENT)
Dept: HEALTH INFORMATION MANAGEMENT | Facility: CLINIC | Age: 74
End: 2019-06-06

## 2019-06-13 ENCOUNTER — OFFICE VISIT (OUTPATIENT)
Dept: FAMILY MEDICINE | Facility: CLINIC | Age: 74
End: 2019-06-13
Payer: COMMERCIAL

## 2019-06-13 ENCOUNTER — MEDICAL CORRESPONDENCE (OUTPATIENT)
Dept: HEALTH INFORMATION MANAGEMENT | Facility: CLINIC | Age: 74
End: 2019-06-13

## 2019-06-13 VITALS
WEIGHT: 293 LBS | HEART RATE: 108 BPM | TEMPERATURE: 99.5 F | BODY MASS INDEX: 51.91 KG/M2 | HEIGHT: 63 IN | RESPIRATION RATE: 24 BRPM | SYSTOLIC BLOOD PRESSURE: 130 MMHG | OXYGEN SATURATION: 90 % | DIASTOLIC BLOOD PRESSURE: 60 MMHG

## 2019-06-13 DIAGNOSIS — I10 ESSENTIAL HYPERTENSION WITH GOAL BLOOD PRESSURE LESS THAN 140/90: ICD-10-CM

## 2019-06-13 DIAGNOSIS — F33.1 MAJOR DEPRESSIVE DISORDER, RECURRENT EPISODE, MODERATE (H): ICD-10-CM

## 2019-06-13 DIAGNOSIS — E11.22 TYPE 2 DIABETES MELLITUS WITH CHRONIC KIDNEY DISEASE, WITHOUT LONG-TERM CURRENT USE OF INSULIN, UNSPECIFIED CKD STAGE (H): Primary | ICD-10-CM

## 2019-06-13 DIAGNOSIS — E78.5 HYPERLIPIDEMIA WITH TARGET LDL LESS THAN 100: ICD-10-CM

## 2019-06-13 DIAGNOSIS — F41.8 MIXED ANXIETY DEPRESSIVE DISORDER: ICD-10-CM

## 2019-06-13 DIAGNOSIS — J45.41 MODERATE PERSISTENT ASTHMA WITH ACUTE EXACERBATION: ICD-10-CM

## 2019-06-13 DIAGNOSIS — J45.40 MODERATE PERSISTENT ASTHMA WITHOUT COMPLICATION: ICD-10-CM

## 2019-06-13 DIAGNOSIS — I50.40 CHF (CONGESTIVE HEART FAILURE), NYHA CLASS I, UNSPECIFIED FAILURE CHRONICITY, COMBINED (H): ICD-10-CM

## 2019-06-13 LAB — HBA1C MFR BLD: 8.1 % (ref 0–5.6)

## 2019-06-13 PROCEDURE — 99214 OFFICE O/P EST MOD 30 MIN: CPT | Performed by: FAMILY MEDICINE

## 2019-06-13 PROCEDURE — 36415 COLL VENOUS BLD VENIPUNCTURE: CPT | Performed by: FAMILY MEDICINE

## 2019-06-13 PROCEDURE — 83036 HEMOGLOBIN GLYCOSYLATED A1C: CPT | Performed by: FAMILY MEDICINE

## 2019-06-13 RX ORDER — FUROSEMIDE 20 MG
TABLET ORAL
Qty: 180 TABLET | Refills: 1 | Status: SHIPPED | OUTPATIENT
Start: 2019-06-13 | End: 2020-04-05

## 2019-06-13 RX ORDER — ATORVASTATIN CALCIUM 40 MG/1
TABLET, FILM COATED ORAL
Qty: 90 TABLET | Refills: 3 | Status: SHIPPED | OUTPATIENT
Start: 2019-06-13 | End: 2019-12-10 | Stop reason: DRUGHIGH

## 2019-06-13 RX ORDER — AMLODIPINE BESYLATE 5 MG/1
TABLET ORAL
Qty: 90 TABLET | Refills: 1 | Status: SHIPPED | OUTPATIENT
Start: 2019-06-13 | End: 2020-02-14

## 2019-06-13 RX ORDER — BUPROPION HYDROCHLORIDE 150 MG/1
150 TABLET, EXTENDED RELEASE ORAL
Qty: 90 TABLET | Refills: 1 | Status: SHIPPED | OUTPATIENT
Start: 2019-06-13 | End: 2020-03-11

## 2019-06-13 RX ORDER — MONTELUKAST SODIUM 10 MG/1
1 TABLET ORAL DAILY
Qty: 90 TABLET | Refills: 1 | Status: SHIPPED | OUTPATIENT
Start: 2019-06-13 | End: 2020-03-11

## 2019-06-13 RX ORDER — GLIPIZIDE 5 MG/1
TABLET, FILM COATED, EXTENDED RELEASE ORAL
Qty: 90 TABLET | Refills: 1 | Status: SHIPPED | OUTPATIENT
Start: 2019-06-13 | End: 2019-12-10 | Stop reason: DRUGHIGH

## 2019-06-13 RX ORDER — PREDNISONE 20 MG/1
TABLET ORAL
Qty: 21 TABLET | Refills: 0 | Status: SHIPPED | OUTPATIENT
Start: 2019-06-13 | End: 2019-09-13

## 2019-06-13 RX ORDER — LISINOPRIL 20 MG/1
TABLET ORAL
Qty: 180 TABLET | Refills: 1 | Status: SHIPPED | OUTPATIENT
Start: 2019-06-13 | End: 2020-03-11

## 2019-06-13 ASSESSMENT — ASTHMA QUESTIONNAIRES
EMERGENCY_ROOM_LAST_YEAR_TOTAL: TWO
QUESTION_1 LAST FOUR WEEKS HOW MUCH OF THE TIME DID YOUR ASTHMA KEEP YOU FROM GETTING AS MUCH DONE AT WORK, SCHOOL OR AT HOME: ALL OF THE TIME
QUESTION_4 LAST FOUR WEEKS HOW OFTEN HAVE YOU USED YOUR RESCUE INHALER OR NEBULIZER MEDICATION (SUCH AS ALBUTEROL): ONE OR TWO TIMES PER DAY
QUESTION_2 LAST FOUR WEEKS HOW OFTEN HAVE YOU HAD SHORTNESS OF BREATH: MORE THAN ONCE A DAY
QUESTION_3 LAST FOUR WEEKS HOW OFTEN DID YOUR ASTHMA SYMPTOMS (WHEEZING, COUGHING, SHORTNESS OF BREATH, CHEST TIGHTNESS OR PAIN) WAKE YOU UP AT NIGHT OR EARLIER THAN USUAL IN THE MORNING: FOUR OR MORE NIGHTS A WEEK
QUESTION_5 LAST FOUR WEEKS HOW WOULD YOU RATE YOUR ASTHMA CONTROL: SOMEWHAT CONTROLLED
ACT_TOTALSCORE: 8

## 2019-06-13 ASSESSMENT — MIFFLIN-ST. JEOR: SCORE: 1934.25

## 2019-06-13 NOTE — NURSING NOTE
"Chief Complaint   Patient presents with     Diabetes       Initial /60   Pulse 108   Temp 99.5  F (37.5  C) (Tympanic)   Resp 24   Ht 1.6 m (5' 3\")   Wt 146.5 kg (323 lb)   SpO2 90%   BMI 57.22 kg/m   Estimated body mass index is 57.22 kg/m  as calculated from the following:    Height as of this encounter: 1.6 m (5' 3\").    Weight as of this encounter: 146.5 kg (323 lb).    Farida Morley CMA    "

## 2019-06-13 NOTE — LETTER
June 14, 2019    Catherine Ferreira  [address] [address]            Dear Catherine,    Your Blood sugars are slightly high. Continue medications, diet, exercise and 5 lbs weight loss. Recheck in 3 months     Below is a copy of the results.  It was a pleasure to see you at your last appointment.    If you have any questions or concerns, please call myself or my nurse at [phone].    Sincerely,    David Dotson MD / yves    Results for orders placed or performed in visit on 06/13/19   Hemoglobin A1c   Result Value Ref Range    Hemoglobin A1C 8.1 (H) 0 - 5.6 %

## 2019-06-13 NOTE — PROGRESS NOTES
"SUBJECTIVE:  Catherine Ferreira, a 73 year old female scheduled an appointment to discuss the following issues:  Follow-up htn, dm, asthma, high cholesterol and depression/anxiety  Asthma - needed prednisone a couple weeks ago - needs prednisone at home.   Taking advair. Had cellulitis improving and has ace bandage at home. No fevers or chills. Swelling/pain improving. On lasix. gerd stable. No dysphagia.   Emotionally stable. No SUICIAL IDEATION OR HOMOCIDAL IDEATION OR [place] TO ER. Blood sugars 150's with prednisone higher. Seeing dietiation with patient and daughter - tomorrow. New glasses one month ok - retinal exam ok. Spring worse.  Medical, social, surgical, and family histories reviewed.    ROS:  All other ROS negative.     OBJECTIVE:  /60   Pulse 108   Temp 99.5  F (37.5  C) (Tympanic)   Resp 24   Ht 1.6 m (5' 3\")   Wt 146.5 kg (323 lb)   SpO2 90%   BMI 57.22 kg/m    EXAM:  GENERAL APPEARANCE: healthy, alert and no distress  EYES: EOMI,  PERRL  HENT: ear canals and TM's normal and nose and mouth without ulcers or lesions  NECK: no adenopathy, no asymmetry, masses, or scars and thyroid normal to palpation  RESP: lungs clear to auscultation - no rales, rhonchi or wheezes  CV: regular rates and rhythm, normal S1 S2, no S3 or S4 and no murmur, click or rub -  ABDOMEN:  soft, nontender, no HSM or masses and bowel sounds normal  MS: extremities normal- no gross deformities noted, no evidence of inflammation in joints, FROM in all extremities.  SKIN: mild redness left shin. No warmth/non-tedner  NEURO: Normal strength and tone, sensory exam grossly normal, mentation intact and speech normal  PSYCH: mentation appears normal and affect normal/bright  PSYCH:mildly anxious    ASSESSMENT / PLAN:  (E11.22) Type 2 diabetes mellitus with chronic kidney disease, without long-term current use of insulin, unspecified CKD stage (H)  (primary encounter diagnosis)  Comment: slighlty worse  Plan: PAF COMPLETED, " Hemoglobin A1c, glipiZIDE         (GLUCOTROL XL) 5 MG 24 hr tablet        Diet/exercise and weight loss. Recheck in 3 months  Back to dm ed if not at goal.     (J45.40) Moderate persistent asthma without complication  Comment: currently stable  Plan: PAF COMPLETED, predniSONE (DELTASONE) 20 MG         tablet, montelukast (SINGULAIR) 10 MG tablet        Allergist if worse. Prednisone prn if future. Er if acutely worsening    (F41.8) Mixed anxiety depressive disorder  Comment: stable  Plan: PAF COMPLETED        Continue meds    (E78.5) Hyperlipidemia with target LDL less than 100  Comment: stable  Plan: atorvastatin (LIPITOR) 40 MG tablet        Recheck in 6 months      (I10) Essential hypertension with goal blood pressure less than 140/90  Comment: stable  Plan: amLODIPine (NORVASC) 5 MG tablet, furosemide         (LASIX) 20 MG tablet, lisinopril         (PRINIVIL/ZESTRIL) 20 MG tablet        Normal renal panel last month. Recheck in 6 months      (F33.1) Major depressive disorder, recurrent episode, moderate (H)  Comment: stable  Plan: buPROPion (WELLBUTRIN SR) 150 MG 12 hr tablet        If SUICIAL IDEATION OR HOMOCIDAL IDEATION OR [place] TO ER    (J45.41) Moderate persistent asthma with acute exacerbation    Plan: fluticasone-salmeterol (ADVAIR DISKUS) 500-50         MCG/DOSE inhaler            (I50.40) CHF (congestive heart failure), NYHA class I, unspecified failure chronicity, combined (H)  Comment: stable  Plan: furosemide (LASIX) 20 MG tablet        Continue limit sodium and do daily weights.      David Dotson MD

## 2019-06-14 ASSESSMENT — ASTHMA QUESTIONNAIRES: ACT_TOTALSCORE: 8

## 2019-06-15 DIAGNOSIS — F33.1 MAJOR DEPRESSIVE DISORDER, RECURRENT EPISODE, MODERATE (H): ICD-10-CM

## 2019-06-15 DIAGNOSIS — E78.5 HYPERLIPIDEMIA WITH TARGET LDL LESS THAN 100: ICD-10-CM

## 2019-06-17 RX ORDER — ATORVASTATIN CALCIUM 40 MG/1
TABLET, FILM COATED ORAL
Qty: 30 TABLET | Refills: 0 | OUTPATIENT
Start: 2019-06-17

## 2019-06-17 RX ORDER — BUPROPION HYDROCHLORIDE 150 MG/1
TABLET, EXTENDED RELEASE ORAL
Qty: 90 TABLET | Refills: 0 | OUTPATIENT
Start: 2019-06-17

## 2019-06-18 ENCOUNTER — TELEPHONE (OUTPATIENT)
Dept: FAMILY MEDICINE | Facility: CLINIC | Age: 74
End: 2019-06-18

## 2019-06-18 ENCOUNTER — TRANSFERRED RECORDS (OUTPATIENT)
Dept: HEALTH INFORMATION MANAGEMENT | Facility: CLINIC | Age: 74
End: 2019-06-18

## 2019-06-18 DIAGNOSIS — J45.40 MODERATE PERSISTENT ASTHMA WITHOUT COMPLICATION: Primary | ICD-10-CM

## 2019-06-22 DIAGNOSIS — E11.22 TYPE 2 DIABETES MELLITUS WITH STAGE 1 CHRONIC KIDNEY DISEASE, WITHOUT LONG-TERM CURRENT USE OF INSULIN (H): ICD-10-CM

## 2019-06-22 DIAGNOSIS — N18.1 TYPE 2 DIABETES MELLITUS WITH STAGE 1 CHRONIC KIDNEY DISEASE, WITHOUT LONG-TERM CURRENT USE OF INSULIN (H): ICD-10-CM

## 2019-06-25 RX ORDER — METFORMIN HCL 500 MG
TABLET, EXTENDED RELEASE 24 HR ORAL
Qty: 180 TABLET | Refills: 1 | Status: SHIPPED | OUTPATIENT
Start: 2019-06-25 | End: 2019-12-09

## 2019-06-25 NOTE — TELEPHONE ENCOUNTER
To Dr. Daivd Dotson to advise please.  Last OV with Dr. David Dotson was 6/13/19 for diabetes  Med last prescribed a year ago.    Name from pharmacy: METFORMIN HCL ER TABS 500MG          Will file in chart as: metFORMIN (GLUCOPHAGE-XR) 500 MG 24 hr tablet    Sig: TAKE 1 TABLET TWICE A DAY WITH MEALS    Disp:  180 tablet    Refills:  0    Start: 6/22/2019    Class: E-Prescribe    For: Type 2 diabetes mellitus with stage 1 chronic kidney disease, without long-term current use of insulin (H)    Requested on: 6/22/2019    Last ordered: 1 year ago by David Dotson MD Last refill: 3/15/2019    Rx #: [id number]    Biguanide Agents Failed6/25 1:50 PM   Patient has documented LDL within the past 12 mos.    Patient has had a Microalbumin in the past 15 mos.

## 2019-07-11 DIAGNOSIS — F41.1 GAD (GENERALIZED ANXIETY DISORDER): ICD-10-CM

## 2019-07-11 RX ORDER — PAROXETINE 20 MG/1
TABLET, FILM COATED ORAL
Qty: 90 TABLET | Refills: 0 | Status: SHIPPED | OUTPATIENT
Start: 2019-07-11 | End: 2019-10-06

## 2019-07-11 NOTE — TELEPHONE ENCOUNTER
/.  Thank you. Monet Olguin R.N.    Per OV note dated 6/ from  Dr. Dotson is as follows:      Return in about 3 months (around 9/13/2019) for med check, Lab Work.     Refilled until 9/19.  Thank you. Monet Olguin R.N.

## 2019-07-16 ENCOUNTER — MEDICAL CORRESPONDENCE (OUTPATIENT)
Dept: HEALTH INFORMATION MANAGEMENT | Facility: CLINIC | Age: 74
End: 2019-07-16

## 2019-08-19 NOTE — PROGRESS NOTES
SUBJECTIVE:   Catherine Ferreira is a 71 year old female who presents to clinic today for the following health issues:      RESPIRATORY SYMPTOMS and lower left leg pain x 1 week, had DVT ruled out last week.     Left lower leg pain,  Saw Shannen Pepper PA-C 9/27/17, transported to emergency department ruled out DVT, diagnosis not determined.  Now have stabbing posterior knee to feet. Pain with ambulation  History copd/chf and fibromyalgia.   Pain past week.   No fevers or chills. No history dvt. Brother with knee replacement.   No major knee issues in past. Some hip pain. Fibromyalgia worse in fall.   Blood sugars a little high. Using inhalers. Still taking 2 lasix/day.   No heat/ice. No wrapping. Taking ibuprofen. History sciatica in right leg in past but not similar sensation.   No chest pain and breathing currently stable. Emotionally ok. Seeing myself in 2 weeks for dm check but normal renal panel/ct head and u/s of LOWER EXTREMETIES for dvt last week in ER.     Left knee pain, unspecified chronicity  Moderate persistent asthma without complication  Fibromyalgia    Past Medical History:   Diagnosis Date     Asthma      Cataract      GERD (gastroesophageal reflux disease)      Glaucoma      Hiatal hernia      hx of ITP      Hyperlipidemia      Hypertension      Obesity      Osteoarthritis        Past Surgical History:   Procedure Laterality Date     APPENDECTOMY       CARDIAC SURGERY  2008    Pacemaker     CARDIAC SURGERY  08/07/16    pacemaker replacement     GYN SURGERY  1990    Hysterectomy. Age 40 yrs.      HC REMOVAL GALLBLADDER         Family History   Problem Relation Age of Onset     CANCER Mother      CANCER Maternal Grandmother      HEART DISEASE Maternal Grandmother      CANCER Maternal Grandfather      HEART DISEASE Paternal Grandmother        Social History   Substance Use Topics     Smoking status: Never Smoker     Smokeless tobacco: Never Used     Alcohol use Yes      Comment: monthly, one beer        ROS:  All other ROS negative  OBJECTIVE:  /78 (BP Location: Other (Comment), Patient Position: Sitting, Cuff Size: Adult Small)  Pulse 88  Temp 97.6  F (36.4  C) (Oral)  SpO2 93%  EXAM:  GENERAL APPEARANCE: healthy, alert and no distress  NECK: no adenopathy, no asymmetry, masses, or scars and thyroid normal to palpation  RESP: lungs clear to auscultation - no rales, rhonchi or wheezes  CV: regular rates and rhythm, normal S1 S2, no S3 or S4 and no murmur, click or rub -  ABDOMEN:  soft, nontender, no HSM or masses and bowel sounds normal  MS: bilateral LOWER EXTREMETIES edema and some pain with RANGE OF MOTION right knee. Varicose veins.   SKIN: no suspicious lesions or rashes  NEURO: Normal strength and tone, sensory exam grossly normal, mentation intact and speech normal  PSYCH: mentation appears normal and affect normal/bright  PSYCH: mildly anxious    ASSESSMENT / PLAN:  (M25.562) Left knee pain, unspecified chronicity  (primary encounter diagnosis)  Comment: strain vs baker's cyst for meniscal tear vs ?  Plan: XR Knee Left 1/2 Views, ORTHOPEDICS ADULT         REFERRAL, traMADol (ULTRAM) 50 MG tablet        Reveiwed risks and side effects of medication  Ice/ace wrap and elevate. Weight loss. To ER if worsening pain/swelling or chest pain/ shortness of breath     (J45.40) Moderate persistent asthma without complication  Comment: stable. Lungs clear.   Plan: to ER if rapid weight gain worsening shortness of breath. Exercise and continue mdi's.     (M79.7) Fibromyalgia  Comment: needs help  Plan: traMADol (ULTRAM) 50 MG tablet        Weight loss/exercise. Will do trial of tramadol Reveiwed risks and side effects of medication  Limit up to 3/day because on paxil. Expected course and warning signs reviewed. Call/email with questions/concerns. Consider pain specialist.   David Dotson     No

## 2019-09-06 ENCOUNTER — TELEPHONE (OUTPATIENT)
Dept: FAMILY MEDICINE | Facility: CLINIC | Age: 74
End: 2019-09-06

## 2019-09-06 NOTE — TELEPHONE ENCOUNTER
Panel Management Review      Patient has the following on her problem list:     Diabetes    ASA: Failed    Last A1C  Lab Results   Component Value Date    A1C 8.1 06/13/2019    A1C 7.3 09/18/2018    A1C 7.6 02/26/2018    A1C 9.2 06/05/2017    A1C 8.8 11/16/2016     A1C tested: FAILED    Last LDL:    Lab Results   Component Value Date    CHOL 232 02/26/2018     Lab Results   Component Value Date    HDL 51 02/26/2018     Lab Results   Component Value Date     02/26/2018     Lab Results   Component Value Date    TRIG 138 02/26/2018     Lab Results   Component Value Date    CHOLHDLRATIO 4.9 06/02/2014     Lab Results   Component Value Date    NHDL 181 02/26/2018       Is the patient on a Statin? YES             Is the patient on Aspirin? NO    Medications     HMG CoA Reductase Inhibitors     atorvastatin (LIPITOR) 40 MG tablet       Salicylates     ASPIRIN NOT PRESCRIBED (INTENTIONAL)             Last three blood pressure readings:  BP Readings from Last 3 Encounters:   06/13/19 130/60   05/18/19 142/80   04/13/19 146/78       Date of last diabetes office visit:      Tobacco History:     History   Smoking Status     Never Smoker   Smokeless Tobacco     Never Used         Hypertension   Last three blood pressure readings:  BP Readings from Last 3 Encounters:   06/13/19 130/60   05/18/19 142/80   04/13/19 146/78     Blood pressure: FAILED    HTN Guidelines:  Less than 140/90      Composite cancer screening  Chart review shows that this patient is due/due soon for the following Mammogram  Summary:    Patient is due/failing the following:   A1C, BP CHECK and MAMMOGRAM    Action needed:   Patient needs office visit for Mammogram, not due till December for diab and htn.    Type of outreach:    Sent letter.    Questions for provider review:    Pt is not on ASA. Will discuss at visit in December                                                                                                                                     Farida Morley, [facility]     Chart routed to 0 .

## 2019-09-13 DIAGNOSIS — J45.40 MODERATE PERSISTENT ASTHMA WITHOUT COMPLICATION: ICD-10-CM

## 2019-09-13 DIAGNOSIS — J45.41 MODERATE PERSISTENT ASTHMA WITH ACUTE EXACERBATION: ICD-10-CM

## 2019-09-13 RX ORDER — PREDNISONE 20 MG/1
TABLET ORAL
Qty: 21 TABLET | Refills: 0 | Status: SHIPPED | OUTPATIENT
Start: 2019-09-13 | End: 2019-12-09

## 2019-09-13 RX ORDER — PREDNISONE 10 MG/1
TABLET ORAL
Qty: 40 TABLET | Refills: 0 | OUTPATIENT
Start: 2019-09-13

## 2019-09-13 NOTE — TELEPHONE ENCOUNTER
Routing refill request to provider for review/approval because:  Drug not on the [facility] refill protocol   Farida Silva BSN, RN

## 2019-10-06 DIAGNOSIS — F41.1 GAD (GENERALIZED ANXIETY DISORDER): ICD-10-CM

## 2019-10-07 RX ORDER — PAROXETINE 20 MG/1
TABLET, FILM COATED ORAL
Qty: 90 TABLET | Refills: 0 | Status: SHIPPED | OUTPATIENT
Start: 2019-10-07 | End: 2020-04-05

## 2019-10-07 NOTE — TELEPHONE ENCOUNTER
Prescription approved per [facility] Refill Protocol.  Daniela Dewitt RN

## 2019-11-06 DIAGNOSIS — E11.22 TYPE 2 DIABETES MELLITUS WITH CHRONIC KIDNEY DISEASE, WITHOUT LONG-TERM CURRENT USE OF INSULIN, UNSPECIFIED CKD STAGE (H): ICD-10-CM

## 2019-11-06 DIAGNOSIS — I10 ESSENTIAL HYPERTENSION WITH GOAL BLOOD PRESSURE LESS THAN 140/90: ICD-10-CM

## 2019-11-06 RX ORDER — AMLODIPINE BESYLATE 5 MG/1
TABLET ORAL
Qty: 90 TABLET | Refills: 4 | OUTPATIENT
Start: 2019-11-06

## 2019-11-06 RX ORDER — GLIPIZIDE 5 MG/1
TABLET, FILM COATED, EXTENDED RELEASE ORAL
Qty: 90 TABLET | Refills: 4 | OUTPATIENT
Start: 2019-11-06

## 2019-11-15 ENCOUNTER — OFFICE VISIT (OUTPATIENT)
Dept: AUDIOLOGY | Facility: CLINIC | Age: 74
End: 2019-11-15
Payer: COMMERCIAL

## 2019-11-15 ENCOUNTER — OFFICE VISIT (OUTPATIENT)
Dept: OTOLARYNGOLOGY | Facility: CLINIC | Age: 74
End: 2019-11-15
Payer: COMMERCIAL

## 2019-11-15 DIAGNOSIS — Z53.9 ERRONEOUS ENCOUNTER--DISREGARD: Primary | ICD-10-CM

## 2019-11-15 DIAGNOSIS — H92.03 OTALGIA, BILATERAL: ICD-10-CM

## 2019-11-15 DIAGNOSIS — M62.838 MUSCLE SPASM: Primary | ICD-10-CM

## 2019-11-15 PROCEDURE — 99203 OFFICE O/P NEW LOW 30 MIN: CPT | Performed by: OTOLARYNGOLOGY

## 2019-11-15 RX ORDER — CYCLOBENZAPRINE HCL 5 MG
5-10 TABLET ORAL 3 TIMES DAILY PRN
Qty: 40 TABLET | Refills: 1 | Status: SHIPPED | OUTPATIENT
Start: 2019-11-15 | End: 2021-02-19

## 2019-11-15 NOTE — PROGRESS NOTES
flexeryChief Complaint - bilateral ear hearing loss and pain    History of Present Illness - Catherine Ferreira is a 73 year old female who presents to me today with hearing loss and pain in both ears. Last April she had a cold and blow nose and left ear drained. Then two months ago, same thing happened on the right. Both ears drain at night. Ear pain is intermittent. Has crackling in ears. No tinnitus. The patient has tried nothing. The patient notes no water exposure or ear canal trauma. Type 2 diabetic. She grinds teeth. Use to wear a mouth guard.     Past Medical History -   Patient Active Problem List   Diagnosis     Moderate persistent asthma     GERD (gastroesophageal reflux disease)     Hyperlipidemia with target LDL less than 100     Exogenous obesity     Hypertension goal BP (blood pressure) < 140/90     Advanced directives, counseling/discussion     Mixed anxiety depressive disorder     Pacemaker     Type 2 diabetes, HbA1C goal < 8% (H)     Tendency toward bleeding easily (H)     Chronic rhinitis     CHF (congestive heart failure), NYHA class I (H)     Health Care Home     Urinary incontinence     Straining to void     Rectocele     Cystocele, midline     Allergic rhinitis     Shingles     Constipation     Abdominal pain, unspecified abdominal location     Type 2 diabetes mellitus with diabetic chronic kidney disease     Other constipation     Gastroesophageal reflux disease without esophagitis     Major depressive disorder, recurrent episode, moderate (H)     CHF (congestive heart failure), NYHA class I, unspecified failure chronicity, combined (H)     Type 2 diabetes mellitus with diabetic chronic kidney disease, unspecified CKD stage     Essential hypertension with goal blood pressure less than 140/90     TEX (generalized anxiety disorder)     Type 2 diabetes mellitus with chronic kidney disease on chronic dialysis, unspecified long term insulin use status     Gastroesophageal reflux disease with  esophagitis     Moderate persistent asthma without complication     Fibromyalgia     BMI 50.0-59.9, adult (H)     Vitamin D deficiency       Current Medications -   Current Outpatient Medications:      albuterol (PROVENTIL) (2.5 MG/3ML) 0.083% neb solution, TAKE 1 VIAL BY NEBULIZATION EVERY 6 HOURS AS NEEDED FOR SHORTNESS OF BREATH, DYSPNEA OR WHEEZING, Disp: 75 mL, Rfl: 1     albuterol (VENTOLIN HFA) 108 (90 Base) MCG/ACT inhaler, INHALE 2 PUFFS BY MOUTH FOUR TIMES DAILY AS NEEDED FOR WHEEZING, Disp: 3 Inhaler, Rfl: 0     amLODIPine (NORVASC) 5 MG tablet, TAKE 1 TABLET BY MOUTH DAILY FOR BLOOD PRESSURE, Disp: 90 tablet, Rfl: 1     ASPIRIN NOT PRESCRIBED (INTENTIONAL), Please choose reason not prescribed, below, Disp: , Rfl:      atorvastatin (LIPITOR) 40 MG tablet, TAKE 1 TABLET BY MOUTH AT BEDTIME FOR CHOLESTEROL., Disp: 90 tablet, Rfl: 3     blood glucose monitoring (NO BRAND SPECIFIED) meter device kit, Use to test blood sugar 2 times daily or as directed. Dispense one touch or brand per insurance or pt preference., Disp: 1 kit, Rfl: 0     blood glucose monitoring (NO BRAND SPECIFIED) test strip, Use to test blood sugars 2 times daily dispense one touch or brand per insurance or pt preference., Disp: 200 each, Rfl: 3     buPROPion (WELLBUTRIN SR) 150 MG 12 hr tablet, Take 1 tablet (150 mg) by mouth daily (before lunch) For depression/energy and weight loss, Disp: 90 tablet, Rfl: 1     CYANOCOBALAMIN PO, Take 1,000 mcg by mouth daily, Disp: , Rfl:      fluticasone (FLONASE) 50 MCG/ACT nasal spray, Spray 2 sprays into both nostrils daily as needed for rhinitis or allergies, Disp: 16 g, Rfl: 11     fluticasone-salmeterol (ADVAIR DISKUS) 500-50 MCG/DOSE inhaler, Inhale 1 puff into the lungs 2 times daily For asthma control.P, Disp: 3 Inhaler, Rfl: 1     furosemide (LASIX) 20 MG tablet, TAKE 2 TABLETS BY MOUTH DAILY WITH BREAKFAST FOR BLOOD PRESSURE, Disp: 180 tablet, Rfl: 1     glipiZIDE (GLUCOTROL XL) 5 MG 24 hr  tablet, TAKE 1 TABLET BY MOUTH EVERY DAY WITH BREAKFAST for diabetes, Disp: 90 tablet, Rfl: 1     ibuprofen (ADVIL,MOTRIN) 600 MG tablet, Take 1 tablet (600 mg) by mouth every 6 hours as needed for moderate pain, Disp: 60 tablet, Rfl: 0     ipratropium - albuterol 0.5 mg/2.5 mg/3 mL (DUONEB) 0.5-2.5 (3) MG/3ML nebulization, Take 1 vial (3 mLs) by nebulization every 6 hours as needed for shortness of breath / dyspnea, Disp: 30 vial, Rfl: 1     [employer] FINEPOINT LANCETS MISC, Use to test blood sugars 2 times daily dispense one touch brand or brand per patient preference., Disp: 200 each, Rfl: 1     lisinopril (PRINIVIL/ZESTRIL) 20 MG tablet, TAKE 1 TABLET BY MOUTH 2 TIMES DAILY FOR BLOOD PRESSURE, Disp: 180 tablet, Rfl: 1     MELATONIN PO, Take 5 mg by mouth nightly as needed, Disp: , Rfl:      metFORMIN (GLUCOPHAGE-XR) 500 MG 24 hr tablet, TAKE 1 TABLET TWICE A DAY WITH MEALS, Disp: 180 tablet, Rfl: 1     METOPROLOL SUCCINATE ER PO, Take 25 mg by mouth daily, Disp: , Rfl:      montelukast (SINGULAIR) 10 MG tablet, Take 1 tablet (10 mg) by mouth daily For asthma/allergies, Disp: 90 tablet, Rfl: 1     multivitamin, therapeutic with minerals (MULTI-VITAMIN) TABS, Take 1 tablet by mouth daily, Disp: , Rfl:      Omega-3 Fatty Acids (OMEGA-3 FISH OIL PO), Take 2 g by mouth daily, Disp: , Rfl:      pantoprazole (PROTONIX) 40 MG EC tablet, Take 1 tablet (40 mg) by mouth daily For heartburn. Take 30-60 minutes before a meal., Disp: 90 tablet, Rfl: 1     PARoxetine (PAXIL) 20 MG tablet, TAKE 1 TABLET BY MOUTH AT BEDTIME FOR ANXIETY, Disp: 90 tablet, Rfl: 0     polyethylene glycol (MIRALAX) powder, Take 17 g by mouth daily as needed for constipation, Disp: 510 g, Rfl: 1     predniSONE (DELTASONE) 20 MG tablet, TAKE 2 TABLETS DAILY FOR 2 DAYS, THEN 1 TABLET DAILY FOR 3 DAYS. AS NEEDED FOR ASTHMA. TAKE WITH FOOD IN MORNING., Disp: 21 tablet, Rfl: 0     rOPINIRole (REQUIP) 0.5 MG tablet, Take 1 tablet (0.5 mg) by mouth daily  (with dinner), Disp: , Rfl:      vitamin D3 (CHOLECALCIFEROL) 2000 units tablet, Take 1 tablet by mouth daily, Disp: 90 tablet, Rfl: 1    Allergies -   Allergies   Allergen Reactions     Asa [Aspirin]      History of low platelets, told to avoid asa     Augmentin Hives     Chromium Other (See Comments)     ear infection     Levaquin Hives     Penicillins Other (See Comments)     Unsure of reaction       Social History -   Social History     Socioeconomic History     Marital status:      Spouse name: Not on file     Number of children: 3     Years of education: 12+     Highest education level: Not on file   Occupational History     Occupation: [employer]     Comment: 2006   Social Needs     Financial resource strain: Not on file     Food insecurity:     Worry: Not on file     Inability: Not on file     Transportation needs:     Medical: Not on file     Non-medical: Not on file   Tobacco Use     Smoking status: Never Smoker     Smokeless tobacco: Never Used   Substance and Sexual Activity     Alcohol use: Yes     Comment: monthly, one beer     Drug use: No     Sexual activity: Never     Birth control/protection: Surgical     Comment: [facility]   Lifestyle     Physical activity:     Days per week: Not on file     Minutes per session: Not on file     Stress: Not on file   Relationships     Social connections:     Talks on phone: Not on file     Gets together: Not on file     Attends Yarsanism service: Not on file     Active member of club or organization: Not on file     Attends meetings of clubs or organizations: Not on file     Relationship status: Not on file     Intimate partner violence:     Fear of current or ex partner: Not on file     Emotionally abused: Not on file     Physically abused: Not on file     Forced sexual activity: Not on file   Other Topics Concern     Parent/sibling w/ CABG, MI or angioplasty before 65F 55M? Not Asked   Social History Narrative     Not on file       Family History -   Family History    Problem Relation Age of Onset     Cancer Mother      Cancer Maternal Grandmother      Heart Disease Maternal Grandmother      Cancer Maternal Grandfather      Heart Disease Paternal Grandmother        Review of Systems - As per HPI and PMHx, some left neck pain,  otherwise 7 system review of the head and neck negative.    Physical Exam  There were no vitals taken for this visit.  General - The patient is in no distress.  Alert and oriented to person and place, answers questions and cooperates with examination appropriately.   Voice and Breathing - The patient was breathing comfortably without the use of accessory muscles. There was no wheezing, stridor, or stertor.  The patients voice was clear and strong.  Ears - both ear canals are clean and clear.  Mild cerumen to go with a hand-held curette.  I see no erythema or edema of the canals.  The tympanic membranes are both intact.  I see no middle ear effusion.  No infection.  Mastoids are normal and there is no tenderness.  She does have tenderness of the TMJ bilaterally and inferior to this infra-auricular area.  No masses.  Eyes - Extraocular movements intact.  Sclera were not icteric or injected.  Mouth - Examination of the oral cavity showed pink, healthy mucosa. No lesions or ulcerations noted.  The tongue was mobile and midline.  Throat - The walls of the oropharynx were smooth, symmetric, and had no lesions or ulcerations.  The tonsillar pillars and soft palate were symmetric.  The uvula was midline on elevation.    Neck - Palpation of the occipital, submental, submandibular, internal jugular chain, and supraclavicular nodes did not demonstrateany abnormal lymph nodes or masses. No parotid masses. Palpation of the thyroid was soft and smooth, with no nodules or goiter appreciated.  The trachea was mobile and midline.  Neurological - Cranial nerves 2 through 12 were grossly intact. House-Brackmann grade 1 out of 6 bilaterally.      Assessment and Plan - Catherine  CARLOTA Ferreira is a 73 year old female has bilateral ear pain but a normal ear exam.  She admits to grinding her teeth at night needs to wear a mouthguard.  I think this is most likely TMJ.  She also has some left neck pain and may have some cervicalgia.  We discussed TMJ precautions.  I also give her prescription for Flexeril.  She can try hot packs, massage, soft diet, and wearing a mouthguard at night.  She does describe a history of drainage by think this is wax as is only in the morning.  She also feels maybe she ruptured her eardrums one in each side last year, if so it has healed.  I see no evidence of infection or fluid now.    Alvarez Pedersen MD  Otolaryngology  [facility]

## 2019-11-15 NOTE — LETTER
11/15/2019         RE: Catherine Ferreira  [address]  [place] [address]        Dear Colleague,    Thank you for referring your patient, Catherine Ferreira, to the [facility]. Please see a copy of my visit note below.    flexeryChief Complaint - bilateral ear hearing loss and pain    History of Present Illness - Catherine Ferreira is a 73 year old female who presents to me today with hearing loss and pain in both ears. Last April she had a cold and blow nose and left ear drained. Then two months ago, same thing happened on the right. Both ears drain at night. Ear pain is intermittent. Has crackling in ears. No tinnitus. The patient has tried nothing. The patient notes no water exposure or ear canal trauma. Type 2 diabetic. She grinds teeth. Use to wear a mouth guard.     Past Medical History -   Patient Active Problem List   Diagnosis     Moderate persistent asthma     GERD (gastroesophageal reflux disease)     Hyperlipidemia with target LDL less than 100     Exogenous obesity     Hypertension goal BP (blood pressure) < 140/90     Advanced directives, counseling/discussion     Mixed anxiety depressive disorder     Pacemaker     Type 2 diabetes, HbA1C goal < 8% (H)     Tendency toward bleeding easily (H)     Chronic rhinitis     CHF (congestive heart failure), NYHA class I (H)     Health Care Home     Urinary incontinence     Straining to void     Rectocele     Cystocele, midline     Allergic rhinitis     Shingles     Constipation     Abdominal pain, unspecified abdominal location     Type 2 diabetes mellitus with diabetic chronic kidney disease     Other constipation     Gastroesophageal reflux disease without esophagitis     Major depressive disorder, recurrent episode, moderate (H)     CHF (congestive heart failure), NYHA class I, unspecified failure chronicity, combined (H)     Type 2 diabetes mellitus with diabetic chronic kidney disease, unspecified CKD stage     Essential hypertension with goal  blood pressure less than 140/90     TEX (generalized anxiety disorder)     Type 2 diabetes mellitus with chronic kidney disease on chronic dialysis, unspecified long term insulin use status     Gastroesophageal reflux disease with esophagitis     Moderate persistent asthma without complication     Fibromyalgia     BMI 50.0-59.9, adult (H)     Vitamin D deficiency       Current Medications -   Current Outpatient Medications:      albuterol (PROVENTIL) (2.5 MG/3ML) 0.083% neb solution, TAKE 1 VIAL BY NEBULIZATION EVERY 6 HOURS AS NEEDED FOR SHORTNESS OF BREATH, DYSPNEA OR WHEEZING, Disp: 75 mL, Rfl: 1     albuterol (VENTOLIN HFA) 108 (90 Base) MCG/ACT inhaler, INHALE 2 PUFFS BY MOUTH FOUR TIMES DAILY AS NEEDED FOR WHEEZING, Disp: 3 Inhaler, Rfl: 0     amLODIPine (NORVASC) 5 MG tablet, TAKE 1 TABLET BY MOUTH DAILY FOR BLOOD PRESSURE, Disp: 90 tablet, Rfl: 1     ASPIRIN NOT PRESCRIBED (INTENTIONAL), Please choose reason not prescribed, below, Disp: , Rfl:      atorvastatin (LIPITOR) 40 MG tablet, TAKE 1 TABLET BY MOUTH AT BEDTIME FOR CHOLESTEROL., Disp: 90 tablet, Rfl: 3     blood glucose monitoring (NO BRAND SPECIFIED) meter device kit, Use to test blood sugar 2 times daily or as directed. Dispense one touch or brand per insurance or pt preference., Disp: 1 kit, Rfl: 0     blood glucose monitoring (NO BRAND SPECIFIED) test strip, Use to test blood sugars 2 times daily dispense one touch or brand per insurance or pt preference., Disp: 200 each, Rfl: 3     buPROPion (WELLBUTRIN SR) 150 MG 12 hr tablet, Take 1 tablet (150 mg) by mouth daily (before lunch) For depression/energy and weight loss, Disp: 90 tablet, Rfl: 1     CYANOCOBALAMIN PO, Take 1,000 mcg by mouth daily, Disp: , Rfl:      fluticasone (FLONASE) 50 MCG/ACT nasal spray, Spray 2 sprays into both nostrils daily as needed for rhinitis or allergies, Disp: 16 g, Rfl: 11     fluticasone-salmeterol (ADVAIR DISKUS) 500-50 MCG/DOSE inhaler, Inhale 1 puff into the  lungs 2 times daily For asthma control.P, Disp: 3 Inhaler, Rfl: 1     furosemide (LASIX) 20 MG tablet, TAKE 2 TABLETS BY MOUTH DAILY WITH BREAKFAST FOR BLOOD PRESSURE, Disp: 180 tablet, Rfl: 1     glipiZIDE (GLUCOTROL XL) 5 MG 24 hr tablet, TAKE 1 TABLET BY MOUTH EVERY DAY WITH BREAKFAST for diabetes, Disp: 90 tablet, Rfl: 1     ibuprofen (ADVIL,MOTRIN) 600 MG tablet, Take 1 tablet (600 mg) by mouth every 6 hours as needed for moderate pain, Disp: 60 tablet, Rfl: 0     ipratropium - albuterol 0.5 mg/2.5 mg/3 mL (DUONEB) 0.5-2.5 (3) MG/3ML nebulization, Take 1 vial (3 mLs) by nebulization every 6 hours as needed for shortness of breath / dyspnea, Disp: 30 vial, Rfl: 1     [employer] FINEPOINT LANCETS MISC, Use to test blood sugars 2 times daily dispense one touch brand or brand per patient preference., Disp: 200 each, Rfl: 1     lisinopril (PRINIVIL/ZESTRIL) 20 MG tablet, TAKE 1 TABLET BY MOUTH 2 TIMES DAILY FOR BLOOD PRESSURE, Disp: 180 tablet, Rfl: 1     MELATONIN PO, Take 5 mg by mouth nightly as needed, Disp: , Rfl:      metFORMIN (GLUCOPHAGE-XR) 500 MG 24 hr tablet, TAKE 1 TABLET TWICE A DAY WITH MEALS, Disp: 180 tablet, Rfl: 1     METOPROLOL SUCCINATE ER PO, Take 25 mg by mouth daily, Disp: , Rfl:      montelukast (SINGULAIR) 10 MG tablet, Take 1 tablet (10 mg) by mouth daily For asthma/allergies, Disp: 90 tablet, Rfl: 1     multivitamin, therapeutic with minerals (MULTI-VITAMIN) TABS, Take 1 tablet by mouth daily, Disp: , Rfl:      Omega-3 Fatty Acids (OMEGA-3 FISH OIL PO), Take 2 g by mouth daily, Disp: , Rfl:      pantoprazole (PROTONIX) 40 MG EC tablet, Take 1 tablet (40 mg) by mouth daily For heartburn. Take 30-60 minutes before a meal., Disp: 90 tablet, Rfl: 1     PARoxetine (PAXIL) 20 MG tablet, TAKE 1 TABLET BY MOUTH AT BEDTIME FOR ANXIETY, Disp: 90 tablet, Rfl: 0     polyethylene glycol (MIRALAX) powder, Take 17 g by mouth daily as needed for constipation, Disp: 510 g, Rfl: 1     predniSONE (DELTASONE)  20 MG tablet, TAKE 2 TABLETS DAILY FOR 2 DAYS, THEN 1 TABLET DAILY FOR 3 DAYS. AS NEEDED FOR ASTHMA. TAKE WITH FOOD IN MORNING., Disp: 21 tablet, Rfl: 0     rOPINIRole (REQUIP) 0.5 MG tablet, Take 1 tablet (0.5 mg) by mouth daily (with dinner), Disp: , Rfl:      vitamin D3 (CHOLECALCIFEROL) 2000 units tablet, Take 1 tablet by mouth daily, Disp: 90 tablet, Rfl: 1    Allergies -   Allergies   Allergen Reactions     Asa [Aspirin]      History of low platelets, told to avoid asa     Augmentin Hives     Chromium Other (See Comments)     ear infection     Levaquin Hives     Penicillins Other (See Comments)     Unsure of reaction       Social History -   Social History     Socioeconomic History     Marital status:      Spouse name: Not on file     Number of children: 3     Years of education: 12+     Highest education level: Not on file   Occupational History     Occupation: [employer]     Comment: 2006   Social Needs     Financial resource strain: Not on file     Food insecurity:     Worry: Not on file     Inability: Not on file     Transportation needs:     Medical: Not on file     Non-medical: Not on file   Tobacco Use     Smoking status: Never Smoker     Smokeless tobacco: Never Used   Substance and Sexual Activity     Alcohol use: Yes     Comment: monthly, one beer     Drug use: No     Sexual activity: Never     Birth control/protection: Surgical     Comment: [facility]   Lifestyle     Physical activity:     Days per week: Not on file     Minutes per session: Not on file     Stress: Not on file   Relationships     Social connections:     Talks on phone: Not on file     Gets together: Not on file     Attends Yarsanism service: Not on file     Active member of club or organization: Not on file     Attends meetings of clubs or organizations: Not on file     Relationship status: Not on file     Intimate partner violence:     Fear of current or ex partner: Not on file     Emotionally abused: Not on file     Physically abused:  Not on file     Forced sexual activity: Not on file   Other Topics Concern     Parent/sibling w/ CABG, MI or angioplasty before 65F 55M? Not Asked   Social History Narrative     Not on file       Family History -   Family History   Problem Relation Age of Onset     Cancer Mother      Cancer Maternal Grandmother      Heart Disease Maternal Grandmother      Cancer Maternal Grandfather      Heart Disease Paternal Grandmother        Review of Systems - As per HPI and PMHx, some left neck pain,  otherwise 7 system review of the head and neck negative.    Physical Exam  There were no vitals taken for this visit.  General - The patient is in no distress.  Alert and oriented to person and place, answers questions and cooperates with examination appropriately.   Voice and Breathing - The patient was breathing comfortably without the use of accessory muscles. There was no wheezing, stridor, or stertor.  The patients voice was clear and strong.  Ears - both ear canals are clean and clear.  Mild cerumen to go with a hand-held curette.  I see no erythema or edema of the canals.  The tympanic membranes are both intact.  I see no middle ear effusion.  No infection.  Mastoids are normal and there is no tenderness.  She does have tenderness of the TMJ bilaterally and inferior to this infra-auricular area.  No masses.  Eyes - Extraocular movements intact.  Sclera were not icteric or injected.  Mouth - Examination of the oral cavity showed pink, healthy mucosa. No lesions or ulcerations noted.  The tongue was mobile and midline.  Throat - The walls of the oropharynx were smooth, symmetric, and had no lesions or ulcerations.  The tonsillar pillars and soft palate were symmetric.  The uvula was midline on elevation.    Neck - Palpation of the occipital, submental, submandibular, internal jugular chain, and supraclavicular nodes did not demonstrateany abnormal lymph nodes or masses. No parotid masses. Palpation of the thyroid was soft  and smooth, with no nodules or goiter appreciated.  The trachea was mobile and midline.  Neurological - Cranial nerves 2 through 12 were grossly intact. House-Brackmann grade 1 out of 6 bilaterally.      Assessment and Plan - Catherine Ferreira is a 73 year old female has bilateral ear pain but a normal ear exam.  She admits to grinding her teeth at night needs to wear a mouthguard.  I think this is most likely TMJ.  She also has some left neck pain and may have some cervicalgia.  We discussed TMJ precautions.  I also give her prescription for Flexeril.  She can try hot packs, massage, soft diet, and wearing a mouthguard at night.  She does describe a history of drainage by think this is wax as is only in the morning.  She also feels maybe she ruptured her eardrums one in each side last year, if so it has healed.  I see no evidence of infection or fluid now.    Alvarez Pedersen MD  Otolaryngology  [facility]      Again, thank you for allowing me to participate in the care of your patient.        Sincerely,        Alvarez Pedersen MD

## 2019-11-15 NOTE — PATIENT INSTRUCTIONS
General Scheduling Information  To schedule your CT/MRI scan, please contact [place] at [phone] [address] [place] [address]    To schedule your Surgery, please contact our Specialty Schedulers at [phone]    ENT Clinic Locations Clinic Hours Telephone Number     Indra Dowling  [address] [place] [address]   Tuesday:       8:00am -- 4:00pm    Wednesday:  8:00am - 4:00pm   To schedule an appointment with   Dr. Pedersen,   please contact our   Specialty Scheduling Department at:     [phone]       [facility]ra Dorsey  [address] [place] [address]   Friday:          8:00am - 4:00pm         Urgent Care Locations Clinic Hours Telephone Numbers     Indra Jimenez  [address] [place] [address]     Monday-Friday:     11:00pm - 9:00pm    Saturday-Sunday:  9:00am - 5:00pm   [phone]     Indra Dorsey  [address] [place] [address]     Monday-Friday:      5:00pm - 9:00pm     Saturday-Sunday:  9:00am - 5:00pm   [phone]

## 2019-11-19 ENCOUNTER — TELEPHONE (OUTPATIENT)
Dept: FAMILY MEDICINE | Facility: CLINIC | Age: 74
End: 2019-11-19

## 2019-11-19 NOTE — LETTER
Catherine Ferreira  [address] [address]          November 21, 2019          Dear Catherine Ferreira      Our records indicate that you have not scheduled for a(n)appointment with David Dotson MD which was recommended by your health care team. Monitoring and managing your preventative and chronic health conditions are very important to us.       If you have received your health care elsewhere, please provide us with that information so it can be documented in your chart.    Please call [phone] or message us through your [employer] account to schedule an appointment or provide information for your chart.     We look forward to seeing you and working with you on your health care needs.     Sincerely,   David Dotson MD          *If you have already scheduled an appointment, please disregard this reminder

## 2019-12-09 ENCOUNTER — OFFICE VISIT (OUTPATIENT)
Dept: FAMILY MEDICINE | Facility: CLINIC | Age: 74
End: 2019-12-09
Payer: COMMERCIAL

## 2019-12-09 VITALS
DIASTOLIC BLOOD PRESSURE: 50 MMHG | TEMPERATURE: 98.8 F | HEIGHT: 63 IN | SYSTOLIC BLOOD PRESSURE: 116 MMHG | OXYGEN SATURATION: 91 % | HEART RATE: 98 BPM | BODY MASS INDEX: 51.91 KG/M2 | WEIGHT: 293 LBS | RESPIRATION RATE: 32 BRPM

## 2019-12-09 DIAGNOSIS — N18.30 TYPE 2 DIABETES MELLITUS WITH STAGE 3 CHRONIC KIDNEY DISEASE, WITHOUT LONG-TERM CURRENT USE OF INSULIN (H): ICD-10-CM

## 2019-12-09 DIAGNOSIS — E11.22 TYPE 2 DIABETES MELLITUS WITH STAGE 3 CHRONIC KIDNEY DISEASE, WITHOUT LONG-TERM CURRENT USE OF INSULIN (H): ICD-10-CM

## 2019-12-09 DIAGNOSIS — J45.41 MODERATE PERSISTENT ASTHMA WITH ACUTE EXACERBATION: Primary | ICD-10-CM

## 2019-12-09 DIAGNOSIS — L21.9 SEBORRHEIC DERMATITIS: ICD-10-CM

## 2019-12-09 DIAGNOSIS — I10 ESSENTIAL HYPERTENSION WITH GOAL BLOOD PRESSURE LESS THAN 140/90: ICD-10-CM

## 2019-12-09 DIAGNOSIS — I87.2 STASIS DERMATITIS OF BOTH LEGS: ICD-10-CM

## 2019-12-09 LAB
ALBUMIN SERPL-MCNC: 2.9 G/DL (ref 3.4–5)
ALP SERPL-CCNC: 143 U/L (ref 40–150)
ALT SERPL W P-5'-P-CCNC: 22 U/L (ref 0–50)
ANION GAP SERPL CALCULATED.3IONS-SCNC: 2 MMOL/L (ref 3–14)
AST SERPL W P-5'-P-CCNC: 13 U/L (ref 0–45)
BASOPHILS # BLD AUTO: 0 10E9/L (ref 0–0.2)
BASOPHILS NFR BLD AUTO: 0.5 %
BILIRUB SERPL-MCNC: 0.4 MG/DL (ref 0.2–1.3)
BUN SERPL-MCNC: 22 MG/DL (ref 7–30)
CALCIUM SERPL-MCNC: 9 MG/DL (ref 8.5–10.1)
CHLORIDE SERPL-SCNC: 106 MMOL/L (ref 94–109)
CHOLEST SERPL-MCNC: 210 MG/DL
CO2 SERPL-SCNC: 32 MMOL/L (ref 20–32)
CREAT SERPL-MCNC: 1.24 MG/DL (ref 0.52–1.04)
DIFFERENTIAL METHOD BLD: ABNORMAL
EOSINOPHIL # BLD AUTO: 0.5 10E9/L (ref 0–0.7)
EOSINOPHIL NFR BLD AUTO: 5.7 %
ERYTHROCYTE [DISTWIDTH] IN BLOOD BY AUTOMATED COUNT: 15.1 % (ref 10–15)
GFR SERPL CREATININE-BSD FRML MDRD: 42 ML/MIN/{1.73_M2}
GLUCOSE SERPL-MCNC: 238 MG/DL (ref 70–99)
HBA1C MFR BLD: 8.8 % (ref 0–5.6)
HCT VFR BLD AUTO: 41.6 % (ref 35–47)
HDLC SERPL-MCNC: 49 MG/DL
HGB BLD-MCNC: 12.6 G/DL (ref 11.7–15.7)
LDLC SERPL CALC-MCNC: 121 MG/DL
LYMPHOCYTES # BLD AUTO: 1.8 10E9/L (ref 0.8–5.3)
LYMPHOCYTES NFR BLD AUTO: 20.3 %
MCH RBC QN AUTO: 27.2 PG (ref 26.5–33)
MCHC RBC AUTO-ENTMCNC: 30.3 G/DL (ref 31.5–36.5)
MCV RBC AUTO: 90 FL (ref 78–100)
MONOCYTES # BLD AUTO: 0.8 10E9/L (ref 0–1.3)
MONOCYTES NFR BLD AUTO: 9 %
NEUTROPHILS # BLD AUTO: 5.7 10E9/L (ref 1.6–8.3)
NEUTROPHILS NFR BLD AUTO: 64.5 %
NONHDLC SERPL-MCNC: 161 MG/DL
PLATELET # BLD AUTO: 244 10E9/L (ref 150–450)
POTASSIUM SERPL-SCNC: 4 MMOL/L (ref 3.4–5.3)
PROT SERPL-MCNC: 6.5 G/DL (ref 6.8–8.8)
RBC # BLD AUTO: 4.64 10E12/L (ref 3.8–5.2)
SODIUM SERPL-SCNC: 140 MMOL/L (ref 133–144)
TRIGL SERPL-MCNC: 200 MG/DL
WBC # BLD AUTO: 8.8 10E9/L (ref 4–11)

## 2019-12-09 PROCEDURE — 85025 COMPLETE CBC W/AUTO DIFF WBC: CPT | Performed by: FAMILY MEDICINE

## 2019-12-09 PROCEDURE — 80061 LIPID PANEL: CPT | Performed by: FAMILY MEDICINE

## 2019-12-09 PROCEDURE — 83036 HEMOGLOBIN GLYCOSYLATED A1C: CPT | Performed by: FAMILY MEDICINE

## 2019-12-09 PROCEDURE — 80053 COMPREHEN METABOLIC PANEL: CPT | Performed by: FAMILY MEDICINE

## 2019-12-09 PROCEDURE — 99214 OFFICE O/P EST MOD 30 MIN: CPT | Performed by: FAMILY MEDICINE

## 2019-12-09 PROCEDURE — 36415 COLL VENOUS BLD VENIPUNCTURE: CPT | Performed by: FAMILY MEDICINE

## 2019-12-09 RX ORDER — PREDNISONE 20 MG/1
20 TABLET ORAL DAILY
Qty: 5 TABLET | Refills: 0 | Status: SHIPPED | OUTPATIENT
Start: 2019-12-09 | End: 2020-02-12

## 2019-12-09 RX ORDER — AZITHROMYCIN 250 MG/1
TABLET, FILM COATED ORAL
Qty: 6 TABLET | Refills: 0 | Status: SHIPPED | OUTPATIENT
Start: 2019-12-09 | End: 2020-01-08

## 2019-12-09 RX ORDER — TRIAMCINOLONE ACETONIDE 1 MG/G
CREAM TOPICAL
Qty: 60 G | Refills: 0 | Status: SHIPPED | OUTPATIENT
Start: 2019-12-09 | End: 2020-05-04

## 2019-12-09 RX ORDER — KETOCONAZOLE 20 MG/G
CREAM TOPICAL
Qty: 30 G | Refills: 1 | Status: SHIPPED | OUTPATIENT
Start: 2019-12-09 | End: 2020-05-04

## 2019-12-09 RX ORDER — METFORMIN HCL 500 MG
1500 TABLET, EXTENDED RELEASE 24 HR ORAL
Qty: 270 TABLET | Refills: 0 | Status: SHIPPED | OUTPATIENT
Start: 2019-12-09 | End: 2020-07-06

## 2019-12-09 ASSESSMENT — PATIENT HEALTH QUESTIONNAIRE - PHQ9: SUM OF ALL RESPONSES TO PHQ QUESTIONS 1-9: 15

## 2019-12-09 ASSESSMENT — MIFFLIN-ST. JEOR: SCORE: 1952.39

## 2019-12-09 NOTE — PROGRESS NOTES
"Subjective     Catherine Ferreira is a 73 year old female super morbidly obese diabetic who presents to clinic today for the following health issues:    HPI   Acute Illness   Acute illness concerns: Sob  Onset: 2 weeks    Fever: no    Chills/Sweats: YES- chills    Headache (location?): no    Sinus Pressure:YES    Conjunctivitis:  YES: bilateral    Ear Pain: no    Rhinorrhea: no    Congestion: YES    Sore Throat:Yes     Cough: YES-non-productive    Wheeze: YES    Decreased Appetite: no    Nausea: no    Vomiting: no    Diarrhea:  no    Dysuria/Freq.: no    Fatigue/Achiness: YES    Sick/Strep Exposure: no     Therapies Tried and outcome: Mucinex             Reviewed and updated as needed this visit by Provider  Tobacco  Allergies  Meds  Problems  Med Hx  Surg Hx  Fam Hx         Review of Systems   ROS COMP: CONSTITUTIONAL: NEGATIVE for fever, chills, change in weight  INTEGUMENTARY/SKIN: rash on face, scalp and legs   EYES: NEGATIVE for vision changes or irritation  ENT/MOUTH: nasal congestion, rhinorrhea-purulent and sinus pressure  RESP:cough-productive, Hx asthma and wheezing  CV: POSITIVE for lower extremity edema and NEGATIVE for chest pain/chest pressure, orthopnea and paroxysmal nocturnal dyspnea  MUSCULOSKELETAL: gait disturbance with use of walker   NEURO: NEGATIVE for weakness, dizziness or paresthesias  ENDOCRINE: Hx diabetes      Objective    /50   Pulse 98   Temp 98.8  F (37.1  C) (Oral)   Resp (!) 32   Ht 1.6 m (5' 3\")   Wt 148.3 kg (327 lb)   SpO2 91%   Breastfeeding No   BMI 57.93 kg/m    Body mass index is 57.93 kg/m .  Physical Exam   GENERAL: alert, no distress and obese  EYES: Eyes grossly normal to inspection, PERRL and conjunctivae and sclerae normal  HENT: ear canals and TM's normal, nose and mouth without ulcers or lesions  NECK: no adenopathy, no asymmetry, masses, or scars and thyroid normal to palpation  RESP: decreased breath sounds throughout  CV: regular rate and rhythm, " normal S1 S2, no S3 or S4, no murmur, click or rub, 1+ bipedal edema   MS: gait disturbance with use of walker   SKIN: red scaly rash in facial creases and bilateral brows; bilateral lower extremity erythema in gator distribution without ulcers   PSYCH: mentation appears normal, affect normal/bright    Diagnostic Test Results:  Labs reviewed in Epic  Results for orders placed or performed in visit on 12/09/19   HEMOGLOBIN A1C     Status: Abnormal   Result Value Ref Range    Hemoglobin A1C 8.8 (H) 0 - 5.6 %   CBC with platelets differential     Status: Abnormal   Result Value Ref Range    WBC 8.8 4.0 - 11.0 10e9/L    RBC Count 4.64 3.8 - 5.2 10e12/L    Hemoglobin 12.6 11.7 - 15.7 g/dL    Hematocrit 41.6 35.0 - 47.0 %    MCV 90 78 - 100 fl    MCH 27.2 26.5 - 33.0 pg    MCHC 30.3 (L) 31.5 - 36.5 g/dL    RDW 15.1 (H) 10.0 - 15.0 %    Platelet Count 244 150 - 450 10e9/L    % Neutrophils 64.5 %    % Lymphocytes 20.3 %    % Monocytes 9.0 %    % Eosinophils 5.7 %    % Basophils 0.5 %    Absolute Neutrophil 5.7 1.6 - 8.3 10e9/L    Absolute Lymphocytes 1.8 0.8 - 5.3 10e9/L    Absolute Monocytes 0.8 0.0 - 1.3 10e9/L    Absolute Eosinophils 0.5 0.0 - 0.7 10e9/L    Absolute Basophils 0.0 0.0 - 0.2 10e9/L    Diff Method Automated Method            Assessment & Plan     (J45.41) Moderate persistent asthma with acute exacerbation  (primary encounter diagnosis)  Plan: azithromycin (ZITHROMAX) 250 MG tablet,         predniSONE (DELTASONE) 20 MG tablet, CBC with         platelets differential        Discussed risks and benefits of this medication. Continue Advair and flonase. Follow-up with PCP     (L21.9) Seborrheic dermatitis  Plan: ketoconazole (NIZORAL) 2 % external cream          (E11.22,  N18.3) Type 2 diabetes mellitus with stage 3 chronic kidney disease, without long-term current use of insulin (H)  Comment: uncontrolled   Plan: Lipid panel reflex to direct LDL Fasting,         HEMOGLOBIN A1C, Comprehensive metabolic panel,     "     metFORMIN (GLUCOPHAGE-XR) 500 MG 24 hr tablet,         Albumin Random Urine Quantitative with Creat         Ratio   Increase metformin and follow-up with PCP     (I10) Essential hypertension with goal blood pressure less than 140/90  (Z68.43) BMI 50.0-59.9, adult (H)  Comment: Well controlled with medications without side effects.   Plan: Lipid panel reflex to direct LDL Fasting,         Comprehensive metabolic panel          (I87.2) Stasis dermatitis of both legs  Plan: triamcinolone (KENALOG) 0.1 % external cream        Lower extremity elevation, low-salt diet, compression stockings, and call or return to clinic as needed if these symptoms worsen or fail to improve      BMI:   Estimated body mass index is 57.93 kg/m  as calculated from the following:    Height as of this encounter: 1.6 m (5' 3\").    Weight as of this encounter: 148.3 kg (327 lb).   Weight management plan: Discussed healthy diet and exercise guidelines            Return in about 5 weeks (around 1/13/2020) for  , Wellness visit.    Michelle Gomez MD  [facility]    "

## 2019-12-10 ENCOUNTER — TELEPHONE (OUTPATIENT)
Dept: FAMILY MEDICINE | Facility: CLINIC | Age: 74
End: 2019-12-10

## 2019-12-10 DIAGNOSIS — N18.30 TYPE 2 DIABETES MELLITUS WITH STAGE 3 CHRONIC KIDNEY DISEASE, WITHOUT LONG-TERM CURRENT USE OF INSULIN (H): ICD-10-CM

## 2019-12-10 DIAGNOSIS — E78.5 HYPERLIPIDEMIA WITH TARGET LDL LESS THAN 100: Primary | ICD-10-CM

## 2019-12-10 DIAGNOSIS — E11.22 TYPE 2 DIABETES MELLITUS WITH STAGE 3 CHRONIC KIDNEY DISEASE, WITHOUT LONG-TERM CURRENT USE OF INSULIN (H): ICD-10-CM

## 2019-12-10 RX ORDER — ATORVASTATIN CALCIUM 80 MG/1
80 TABLET, FILM COATED ORAL DAILY
Qty: 90 TABLET | Refills: 3 | Status: SHIPPED | OUTPATIENT
Start: 2019-12-10 | End: 2020-12-20

## 2019-12-10 RX ORDER — GLIPIZIDE 10 MG/1
10 TABLET, FILM COATED, EXTENDED RELEASE ORAL DAILY
Qty: 90 TABLET | Refills: 0 | Status: SHIPPED | OUTPATIENT
Start: 2019-12-10 | End: 2020-05-13

## 2019-12-10 ASSESSMENT — ASTHMA QUESTIONNAIRES: ACT_TOTALSCORE: 7

## 2019-12-10 NOTE — TELEPHONE ENCOUNTER
Called patient but voicemail box was full, will try again another time.     Notes recorded by Michelle Gomez MD on 12/10/2019 at 7:57 AM CST  Please call patient:    Your blood glucose and cholesterol are uncontrolled. I recommend increasing the dose of your glipizide and atorvastatin medications and have sent new prescriptions to your pharmacy. Avoid eating sweets and drinking sweetened beverages, and consider seeing a diabetes educator for help with meal planning. Increase your exercise. Follow-up with your primary provider about your diabetes.     Kidney function is measured by blood work that measures creatinine and calculates estimated GFR (glomerular filtration rate).  As the eGFR reading has become more readily available with routine labs, there has been a renewed focus on managing chronic kidney disease in the medical community and at our clinic in particular.     By current criteria you do have stage 3 chronic kidney disease as your eGFR is < 60.  This is not something you should worry about as it is quite common, but it does mean we should be monitoring some labs on a regular basis and making sure we are controlling other risk factors that could cause worsening of your kidney function.  Avoid taking medications such as ibuprofen and aleve, which can cause kidney injury.      Michelle Gomez MD

## 2019-12-10 NOTE — RESULT ENCOUNTER NOTE
Please call patient:    Your blood glucose and cholesterol are uncontrolled. I recommend increasing the dose of your glipizide and atorvastatin medications and have sent new prescriptions to your pharmacy. Avoid eating sweets and drinking sweetened beverages, and consider seeing a diabetes educator for help with meal planning. Increase your exercise. Follow-up with your primary provider about your diabetes.     Kidney function is measured by blood work that measures creatinine and calculates estimated GFR (glomerular filtration rate).  As the eGFR reading has become more readily available with routine labs, there has been a renewed focus on managing chronic kidney disease in the medical community and at our clinic in particular.     By current criteria you do have stage 3 chronic kidney disease as your eGFR is < 60.  This is not something you should worry about as it is quite common, but it does mean we should be monitoring some labs on a regular basis and making sure we are controlling other risk factors that could cause worsening of your kidney function.  Avoid taking medications such as ibuprofen and aleve, which can cause kidney injury.      Michelle Gomez MD

## 2019-12-18 NOTE — TELEPHONE ENCOUNTER
Attempted to call patient (3rd attempt). Unable to LVM, mailbox full. Please mail letter to patient with lab result message from provider.    Called pt's daughter, Xin (has consent to communicate) LVM to call RN hotline ([phone]).

## 2020-01-16 DIAGNOSIS — Z76.89 HEALTH CARE HOME: Primary | ICD-10-CM

## 2020-01-17 ENCOUNTER — PATIENT OUTREACH (OUTPATIENT)
Dept: CARE COORDINATION | Facility: CLINIC | Age: 75
End: 2020-01-17

## 2020-01-17 NOTE — PROGRESS NOTES
Clinic Care Coordination Contact  [facility]/Voicemail    Referral Source: [facility]  Clinical Data: Care Coordinator Outreach  Outreach attempted x 1.  Mail box is full and not accepting new messages at this time.  Plan: Care Coordinator sent care coordination introduction letter on 2/11/19 via mail. Care Coordinator will try to reach patient again in 1-2 business days.    TINO Garcia RN Clinic Care Coordinator   [place], [place], [place]  Phone: [phone]

## 2020-01-17 NOTE — LETTER
[facility] CARE COORDINATION  [address] [facility] [address]    January 20, 2020    Catherine Ferreira  [address] [address]      Dear Catherine,    I am a clinic care coordinator who works with David Dotson MD at [facility]. I recently tried to call and was unable to reach you. I wanted to introduce myself and provide you with my contact information so that you can call me with questions or concerns about your health care. Below is a description of clinic care coordination and how I can further assist you.     The clinic care coordinator is a registered nurse and/or  who understand the health care system. The goal of clinic care coordination is to help you manage your health and improve access to the [facility] system in the most efficient manner. The registered nurse can assist you in meeting your health care goals by providing education, coordinating services, and strengthening the communication among your providers. The  can assist you with financial, behavioral, psychosocial, chemical dependency, counseling, and/or psychiatric resources.    Please feel free to contact me at [phone], with any questions or concerns. We at [facility] are focused on providing you with the highest-quality healthcare experience possible and that all starts with you.     Sincerely,   TINO Garcia RN Clinic Care Coordinator   [facility], [place], [place]  Phone: [phone]

## 2020-02-10 ENCOUNTER — TELEPHONE (OUTPATIENT)
Dept: FAMILY MEDICINE | Facility: CLINIC | Age: 75
End: 2020-02-10

## 2020-02-10 NOTE — TELEPHONE ENCOUNTER
Home care wondering if Dr Dotson would follow her for home care services. Please call with verbal orders or to advise. Caller informed that calls received after 3pm may not be returned same day.

## 2020-02-10 NOTE — TELEPHONE ENCOUNTER
Last ov with Dr. Dotson 6/13/19, pt no showed a 1/13/20.  To provider to advise.  Farida Silva BSN, RN

## 2020-02-11 PROBLEM — Z86.718 HISTORY OF DVT (DEEP VEIN THROMBOSIS): Chronic | Status: ACTIVE | Noted: 2020-02-11

## 2020-02-11 PROBLEM — G89.4 PAIN SYNDROME, CHRONIC: Chronic | Status: ACTIVE | Noted: 2020-02-11

## 2020-02-11 NOTE — PROGRESS NOTES
Subjective     Catherine Ferreira is a 74 year old female with a past medical history significant for HFpEF, COPD, asthma, s/p PPM, DVT on Eliquis, RODGER, hypertension, hyperlipidemia, diabetes, morbid obesity, GERD, fibromyalgia, anxiety, depression, and restless legs who presents to clinic today for the following health issues:    HPI   Follow up from hospital/ TCU- needs home care orders.     Admitted at [facility] 20-20 with back pain after fall at home.  Patient was obtunded with increased pCO2 on vbg. Noted to have hypercarbic respiratory failure and hypercarbic encephalopathy.  Patient much better after bipap treatment. Thought to be related to obesity hypoventilation syndrome.  Patient needs sleep study, not using CPAP at home.  Discharged to TCU, now home with home care.  Home care was initiated today but she needs face to face orders.  Home care is through [facility].  Will be receiving PT, OT, RN and HHA.  She is here with her daughter today.     Patient reports she is doing much better since being home.  Denies any concerns today.  Needs updated handicapped parking sticker-  2019.  She has a hard time walking very far without becoming short of breath.  Walks with walker.     Patient is due for f/u with PCP next month for diabetic check.  Her labs were stable while in the hospital, no recommendations for follow-up labs today.        The following issues were addressed during the hospitalization: (Per Care Everywhere)     Acute low back pain      -  Recent imaging was okay      -  Pain control avoiding narcotics given her obstructive sleep apnea      -  Therapies              Stable now      Hypercarbic respiratory failure, hypercarbic encephalopathy  Obstructive sleep apnea  Likely obesity hypoventilation syndrome      -  Patient obtunded with increased pCO2 on vbg.       -  Much improved after biPAP.       -  Goal oxygen saturations: 90%      -  Overall, she is much improved      -  States  that she does not have a home CPAP. She will need to schedule outpatient sleep study. She unfortunately missed her previously scheduled study due to weather.       -  Off oxygen now              she would need a sleep study as out-patient      COPD:      -  Does not seem to be in acute exacerbation      -  Continue home inhalers     Chronic kidney disease, likely stage 3      -  Creatinine at about baseline      -  Continue to follow     History of deep vein thrombosis:      -  Continue apixaban     Heart failure with preserved ejection fraction      -  Does not seem to be in acute exacerbation      -  lasix changed to oral       -  ACE, beta-blocker     Hypertension      -  Essential hypertension - chronic, controlled      -  Current home medications:  amLODIPine, furosemide, lisinopril, and metoprolol succinate      -  Adjustments made to home regimen during this hospital stay:               -  None      Diabetes mellitus Type 2      -  Associated with: nephropathy and cardiovascular disease      -  Monitor accucheck intensively, cover with corrective dose insulin as indicated      -  Home treatment regimen: glipiZIDE extended-release and metFORMIN      -  Adjustments made during hospital stay:               -  Hold oral medications for now              -  Corrective dose insulin       -  HbA1C = 7.6 (5/20/2016)     Chronic pain syndrome      -  Continue SSRI      -  Gabapentin       -  Wellbutrin XL      -  Hold tramadol given altered state currently      Restless leg syndrome:      -  Continue ropinirole      Gastroesophogeal reflux disease:      -  Continue proton pump inhibitor      PROCEDURES PERFORMED DURING HOSPITALIZATION: None     COMPLICATIONS IN HOSPITAL: None    See hospital discharge summary for further details.            Patient Active Problem List   Diagnosis     Hyperlipidemia with target LDL less than 100     Advanced directives, counseling/discussion     Pacemaker     Tendency toward bleeding  easily (H)     Health Care Home     Urinary incontinence     Rectocele     Cystocele, midline     Allergic rhinitis     Other constipation     Major depressive disorder, recurrent episode, moderate (H)     Essential hypertension with goal blood pressure less than 140/90     TEX (generalized anxiety disorder)     Type 2 diabetes mellitus with stage 3 chronic kidney disease, without long-term current use of insulin (H)     Gastroesophageal reflux disease with esophagitis     Moderate persistent asthma without complication     Fibromyalgia     BMI 50.0-59.9, adult (H)     Vitamin D deficiency     RLS (restless legs syndrome)     Stasis dermatitis of both legs     Seborrheic dermatitis     (HFpEF) heart failure with preserved ejection fraction (H)     History of DVT (deep vein thrombosis)     Pain syndrome, chronic     Past Surgical History:   Procedure Laterality Date     APPENDECTOMY       EP PACEMAKER  2008    Pacemaker     EP PACEMAKER  08/07/16    pacemaker replacement     HC REMOVAL GALLBLADDER       HYSTERECTOMY  1990            Social History     Tobacco Use     Smoking status: Never Smoker     Smokeless tobacco: Never Used   Substance Use Topics     Alcohol use: Yes     Comment: monthly, one beer     Family History   Problem Relation Age of Onset     Cancer Mother      Cancer Maternal Grandmother      Heart Disease Maternal Grandmother      Cancer Maternal Grandfather      Heart Disease Paternal Grandmother          Current Outpatient Medications   Medication Sig Dispense Refill     albuterol (PROVENTIL) (2.5 MG/3ML) 0.083% neb solution TAKE 1 VIAL BY NEBULIZATION EVERY 6 HOURS AS NEEDED FOR SHORTNESS OF BREATH, DYSPNEA OR WHEEZING 75 mL 1     albuterol (VENTOLIN HFA) 108 (90 Base) MCG/ACT inhaler INHALE 2 PUFFS BY MOUTH FOUR TIMES DAILY AS NEEDED FOR WHEEZING 3 Inhaler 0     amLODIPine (NORVASC) 5 MG tablet TAKE 1 TABLET BY MOUTH DAILY FOR BLOOD PRESSURE 90 tablet 1     apixaban ANTICOAGULANT (ELIQUIS) 5 MG  tablet Take 5 mg by mouth 2 times daily       ASPIRIN NOT PRESCRIBED (INTENTIONAL) Please choose reason not prescribed, below       atorvastatin (LIPITOR) 80 MG tablet Take 1 tablet (80 mg) by mouth daily 90 tablet 3     blood glucose monitoring (NO BRAND SPECIFIED) test strip Use to test blood sugars 2 times daily dispense one touch or brand per insurance or pt preference. 200 each 3     buPROPion (WELLBUTRIN SR) 150 MG 12 hr tablet Take 1 tablet (150 mg) by mouth daily (before lunch) For depression/energy and weight loss 90 tablet 1     CYANOCOBALAMIN PO Take 1,000 mcg by mouth daily       cyclobenzaprine (FLEXERIL) 5 MG tablet Take 1-2 tablets (5-10 mg) by mouth 3 times daily as needed for muscle spasms 40 tablet 1     fluticasone (FLONASE) 50 MCG/ACT nasal spray Spray 2 sprays into both nostrils daily as needed for rhinitis or allergies 16 g 11     fluticasone-salmeterol (ADVAIR DISKUS) 500-50 MCG/DOSE inhaler Inhale 1 puff into the lungs 2 times daily For asthma control.P 3 Inhaler 1     furosemide (LASIX) 20 MG tablet TAKE 2 TABLETS BY MOUTH DAILY WITH BREAKFAST FOR BLOOD PRESSURE 180 tablet 1     gabapentin (NEURONTIN) 300 MG capsule Take 300 mg by mouth At Bedtime       glipiZIDE (GLUCOTROL XL) 10 MG 24 hr tablet Take 1 tablet (10 mg) by mouth daily 90 tablet 0     ibuprofen (ADVIL,MOTRIN) 600 MG tablet Take 1 tablet (600 mg) by mouth every 6 hours as needed for moderate pain 60 tablet 0     ketoconazole (NIZORAL) 2 % external cream Apply to facial rash daily as needed 30 g 1     Lidocaine (LIDOCARE) 4 % Patch Place 1 patch onto the skin every 12 hours       [place] FINEPOINT LANCETS MISC Use to test blood sugars 2 times daily dispense one touch brand or brand per patient preference. 200 each 1     lisinopril (PRINIVIL/ZESTRIL) 20 MG tablet TAKE 1 TABLET BY MOUTH 2 TIMES DAILY FOR BLOOD PRESSURE 180 tablet 1     MELATONIN PO Take 5 mg by mouth nightly as needed       Menthol, Topical Analgesic, 4 % GEL  "Apply 4 applicators topically 4 times daily       metFORMIN (GLUCOPHAGE-XR) 500 MG 24 hr tablet Take 3 tablets (1,500 mg) by mouth daily (with dinner) 270 tablet 0     METOPROLOL SUCCINATE ER PO Take 25 mg by mouth daily       montelukast (SINGULAIR) 10 MG tablet Take 1 tablet (10 mg) by mouth daily For asthma/allergies 90 tablet 1     multivitamin, therapeutic with minerals (MULTI-VITAMIN) TABS Take 1 tablet by mouth daily       nystatin (MYCOSTATIN) 280477 UNIT/GM external powder Apply 1 strip topically 4 times daily       Omega-3 Fatty Acids (OMEGA-3 FISH OIL PO) Take 2 g by mouth daily       pantoprazole (PROTONIX) 40 MG EC tablet Take 1 tablet (40 mg) by mouth daily For heartburn. Take 30-60 minutes before a meal. 90 tablet 1     PARoxetine (PAXIL) 20 MG tablet TAKE 1 TABLET BY MOUTH AT BEDTIME FOR ANXIETY 90 tablet 0     polyethylene glycol (MIRALAX) powder Take 17 g by mouth daily as needed for constipation 510 g 1     rOPINIRole (REQUIP) 0.5 MG tablet Take 1 tablet (0.5 mg) by mouth daily (with dinner)       traMADol (ULTRAM) 50 MG tablet Take 50 mg by mouth every 8 hours       triamcinolone (KENALOG) 0.1 % external cream Apply to affected area sparingly twice daily as needed 60 g 0     vitamin D3 (CHOLECALCIFEROL) 2000 units tablet Take 1 tablet by mouth daily 90 tablet 1     Allergies   Allergen Reactions     Asa [Aspirin]      History of low platelets, told to avoid asa     Augmentin Hives     Chromium Other (See Comments)     ear infection     Levaquin Hives     Penicillins Other (See Comments)     Unsure of reaction       Reviewed and updated as needed this visit by Provider  Tobacco  Allergies  Meds  Problems  Med Hx  Surg Hx  Fam Hx         Review of Systems   ROS COMP: Constitutional, HEENT, cardiovascular, pulmonary, gi and gu systems are negative, except as otherwise noted.      Objective    /72   Pulse 89   Ht 1.6 m (5' 3\")   Wt 144.7 kg (319 lb)   SpO2 93%   BMI 56.51 kg/m  "   Body mass index is 56.51 kg/m .  Physical Exam   GENERAL: healthy, alert and no distress.  Obese habitus.   NECK: no adenopathy, no asymmetry, masses, or scars and thyroid normal to palpation  RESP: lungs clear to auscultation - no rales, rhonchi or wheezes  CV: regular rate and rhythm, normal S1 S2, no S3 or S4, no murmur, click or rub, no peripheral edema and peripheral pulses strong  ABDOMEN: soft, nontender, no hepatosplenomegaly, no masses and bowel sounds normal  MS: no gross musculoskeletal defects noted, no edema    Diagnostic Test Results:  Labs reviewed in Epic        Assessment & Plan     (Z09) Hospital discharge follow-up  (primary encounter diagnosis)  Comment: Home from TCU, needs home care orders.     Plan: Home care orders entered today.    (G47.33) RODGER (obstructive sleep apnea)  Comment: Currently untreated, needs sleep study.  Plan: SLEEP EVALUATION & MANAGEMENT REFERRAL - ADULT         -Other (Respond in commments), HOME CARE         NURSING REFERRAL        Sleep study ordered.  Patient wishes to get this done at location close to [facility], daughter will help arrange.    (M54.5) Midline low back pain without sciatica, unspecified chronicity  Comment: Pain has been improving with therapies.  Plan: HOME CARE NURSING REFERRAL        Home care referral for continued PT.  Would refrain from further Tramadol given hx of RODGER and recent respiratory failure.     (E11.22,  N18.3) Type 2 diabetes mellitus with stage 3 chronic kidney disease, without long-term current use of insulin (H)  Comment: Chronic, uncontrolled.  Patient needs diabetes education.  Plan: HOME CARE NURSING REFERRAL        She will return to see her PCP in March for diabetic check.  Home care orders.  Consider referral to diabetic education.    (I50.40) CHF (congestive heart failure), NYHA class I, unspecified failure chronicity, combined (H)  Comment: Chronic, stable.  Plan: HOME CARE NURSING REFERRAL        Continue lisinopril  and furosemide.  Home care orders.    (I10) Essential hypertension  Comment: Chronic, stable.  Plan: Continue medications without changes- amlodipine, furosemide, lisinopril, and metoprolol succinate    (Z68.43) BMI 50.0-59.9, adult (H)  Comment: Chronic, stable, on-going risk factor.  Makes ambulation difficult.  History of falls.  Plan: HOME CARE NURSING REFERRAL        Home care orders.    (F33.1) Major depressive disorder, recurrent episode, moderate (H)  Comment: Chronic, stable.  Plan: Continue Paxil and Wellbutrin.     (G89.4) Pain syndrome, chronic  Comment: Chronic, stable. Recent acute low back pain.   Plan: HOME CARE NURSING REFERRAL        Home care orders.     (Z86.718) History of DVT (deep vein thrombosis)  Comment: Chronic, stable.   Plan: Continue Eliquis.     (Z91.81) Personal history of fall  Comment: Recent fall with hospitalization, on-going risk for fall with age and comorbid conditions  Plan: HOME CARE NURSING REFERRAL        Home care orders.     See Patient Instructions  Patient Instructions   Home care orders entered today for PT, OT, RN and home health aid.    Order placed for sleep study.  Please call to schedule.        You are due to see Dr. Dotson for a diabetic check in March.   Please make an appointment on the way out.       Return in about 4 weeks (around 3/11/2020) for Diabetic check Dr. Dotson.    Celia Peña, [facility]

## 2020-02-11 NOTE — TELEPHONE ENCOUNTER
Patient is obviously my long time patient and ok to follow-up but will need md appointment to update medical issues. David Dotson MD

## 2020-02-12 ENCOUNTER — OFFICE VISIT (OUTPATIENT)
Dept: INTERNAL MEDICINE | Facility: CLINIC | Age: 75
End: 2020-02-12
Payer: COMMERCIAL

## 2020-02-12 VITALS
WEIGHT: 293 LBS | DIASTOLIC BLOOD PRESSURE: 72 MMHG | HEART RATE: 89 BPM | HEIGHT: 63 IN | BODY MASS INDEX: 51.91 KG/M2 | OXYGEN SATURATION: 93 % | SYSTOLIC BLOOD PRESSURE: 136 MMHG

## 2020-02-12 DIAGNOSIS — Z91.81 PERSONAL HISTORY OF FALL: ICD-10-CM

## 2020-02-12 DIAGNOSIS — Z09 HOSPITAL DISCHARGE FOLLOW-UP: Primary | ICD-10-CM

## 2020-02-12 DIAGNOSIS — Z86.718 HISTORY OF DVT (DEEP VEIN THROMBOSIS): Chronic | ICD-10-CM

## 2020-02-12 DIAGNOSIS — F33.1 MAJOR DEPRESSIVE DISORDER, RECURRENT EPISODE, MODERATE (H): ICD-10-CM

## 2020-02-12 DIAGNOSIS — M54.50 MIDLINE LOW BACK PAIN WITHOUT SCIATICA, UNSPECIFIED CHRONICITY: ICD-10-CM

## 2020-02-12 DIAGNOSIS — I10 ESSENTIAL HYPERTENSION WITH GOAL BLOOD PRESSURE LESS THAN 140/90: ICD-10-CM

## 2020-02-12 DIAGNOSIS — N18.30 TYPE 2 DIABETES MELLITUS WITH STAGE 3 CHRONIC KIDNEY DISEASE, WITHOUT LONG-TERM CURRENT USE OF INSULIN (H): ICD-10-CM

## 2020-02-12 DIAGNOSIS — G89.4 PAIN SYNDROME, CHRONIC: Chronic | ICD-10-CM

## 2020-02-12 DIAGNOSIS — I50.40 CHF (CONGESTIVE HEART FAILURE), NYHA CLASS I, UNSPECIFIED FAILURE CHRONICITY, COMBINED (H): ICD-10-CM

## 2020-02-12 DIAGNOSIS — E11.22 TYPE 2 DIABETES MELLITUS WITH STAGE 3 CHRONIC KIDNEY DISEASE, WITHOUT LONG-TERM CURRENT USE OF INSULIN (H): ICD-10-CM

## 2020-02-12 DIAGNOSIS — G47.33 OSA (OBSTRUCTIVE SLEEP APNEA): ICD-10-CM

## 2020-02-12 PROCEDURE — 99214 OFFICE O/P EST MOD 30 MIN: CPT | Performed by: NURSE PRACTITIONER

## 2020-02-12 RX ORDER — GABAPENTIN 300 MG/1
300 CAPSULE ORAL AT BEDTIME
COMMUNITY
Start: 2020-02-06 | End: 2020-04-05

## 2020-02-12 RX ORDER — TRAMADOL HYDROCHLORIDE 50 MG/1
50 TABLET ORAL EVERY 8 HOURS
COMMUNITY
Start: 2020-01-22 | End: 2020-05-04

## 2020-02-12 RX ORDER — LIDOCAINE 4 G/G
1 PATCH TOPICAL EVERY 12 HOURS
COMMUNITY
Start: 2020-01-29

## 2020-02-12 RX ORDER — NYSTATIN 100000 [USP'U]/G
1 POWDER TOPICAL 4 TIMES DAILY
COMMUNITY
End: 2021-03-01

## 2020-02-12 ASSESSMENT — MIFFLIN-ST. JEOR: SCORE: 1911.1

## 2020-02-12 NOTE — PATIENT INSTRUCTIONS
Home care orders entered today for PT, OT, RN and home health aid.    Order placed for sleep study.  Please call to schedule.        You are due to see Dr. Dotson for a diabetic check in March.   Please make an appointment on the way out.

## 2020-02-14 ENCOUNTER — MEDICAL CORRESPONDENCE (OUTPATIENT)
Dept: HEALTH INFORMATION MANAGEMENT | Facility: CLINIC | Age: 75
End: 2020-02-14

## 2020-02-14 DIAGNOSIS — I10 ESSENTIAL HYPERTENSION WITH GOAL BLOOD PRESSURE LESS THAN 140/90: ICD-10-CM

## 2020-02-14 RX ORDER — AMLODIPINE BESYLATE 5 MG/1
TABLET ORAL
Qty: 90 TABLET | Refills: 0 | Status: SHIPPED | OUTPATIENT
Start: 2020-02-14 | End: 2020-08-17

## 2020-02-14 NOTE — TELEPHONE ENCOUNTER
"Patient has upcoming/ pending appointment:    Next 5 appointments (look out 90 days)    Mar 16, 2020  3:45 PM CDT  SHORT with David Dotson MD  [facility] ([facility]) [address] [facility] [address] [phone]          Rx refilled per [facility] refill protocol.    Shelby PRINCEN, RN      Requested Prescriptions   Signed Prescriptions Disp Refills    amLODIPine (NORVASC) 5 MG tablet 90 tablet 0     Sig: TAKE 1 TABLET BY MOUTH DAILY FOR BLOOD PRESSURE       Calcium Channel Blockers Protocol  Failed - 2/14/2020 12:46 PM        Failed - Normal serum creatinine on file in past 12 months     Recent Labs   Lab Test 12/09/19  1238  04/22/15   CR 1.24*   < >  --    CRPOC  --   --  1.1    < > = values in this interval not displayed.             Passed - Blood pressure under 140/90 in past 12 months     BP Readings from Last 3 Encounters:   02/12/20 136/72   12/09/19 116/50   06/13/19 130/60                 Passed - Recent (12 mo) or future (30 days) visit within the authorizing provider's specialty     Patient has had an office visit with the authorizing provider or a provider within the authorizing providers department within the previous 12 mos or has a future within next 30 days. See \"Patient Info\" tab in inbasket, or \"Choose Columns\" in Meds & Orders section of the refill encounter.              Passed - Medication is active on med list        Passed - Patient is age 18 or older        Passed - No active pregnancy on record        Passed - No positive pregnancy test in past 12 months          "

## 2020-02-17 ENCOUNTER — MEDICAL CORRESPONDENCE (OUTPATIENT)
Dept: HEALTH INFORMATION MANAGEMENT | Facility: CLINIC | Age: 75
End: 2020-02-17

## 2020-02-18 ENCOUNTER — MEDICAL CORRESPONDENCE (OUTPATIENT)
Dept: HEALTH INFORMATION MANAGEMENT | Facility: CLINIC | Age: 75
End: 2020-02-18

## 2020-02-24 ENCOUNTER — MEDICAL CORRESPONDENCE (OUTPATIENT)
Dept: HEALTH INFORMATION MANAGEMENT | Facility: CLINIC | Age: 75
End: 2020-02-24

## 2020-02-27 ENCOUNTER — MEDICAL CORRESPONDENCE (OUTPATIENT)
Dept: HEALTH INFORMATION MANAGEMENT | Facility: CLINIC | Age: 75
End: 2020-02-27

## 2020-03-03 ENCOUNTER — DOCUMENTATION ONLY (OUTPATIENT)
Dept: OTHER | Facility: CLINIC | Age: 75
End: 2020-03-03

## 2020-03-05 ENCOUNTER — MEDICAL CORRESPONDENCE (OUTPATIENT)
Dept: HEALTH INFORMATION MANAGEMENT | Facility: CLINIC | Age: 75
End: 2020-03-05

## 2020-03-09 DIAGNOSIS — F33.1 MAJOR DEPRESSIVE DISORDER, RECURRENT EPISODE, MODERATE (H): ICD-10-CM

## 2020-03-11 DIAGNOSIS — I10 ESSENTIAL HYPERTENSION WITH GOAL BLOOD PRESSURE LESS THAN 140/90: ICD-10-CM

## 2020-03-11 DIAGNOSIS — J45.40 MODERATE PERSISTENT ASTHMA WITHOUT COMPLICATION: ICD-10-CM

## 2020-03-11 RX ORDER — MONTELUKAST SODIUM 10 MG/1
TABLET ORAL
Qty: 30 TABLET | Refills: 0 | Status: SHIPPED | OUTPATIENT
Start: 2020-03-11 | End: 2020-05-13

## 2020-03-11 RX ORDER — BUPROPION HYDROCHLORIDE 150 MG/1
TABLET, EXTENDED RELEASE ORAL
Qty: 90 TABLET | Refills: 0 | Status: SHIPPED | OUTPATIENT
Start: 2020-03-11 | End: 2020-07-13

## 2020-03-11 RX ORDER — LISINOPRIL 20 MG/1
TABLET ORAL
Qty: 60 TABLET | Refills: 0 | Status: SHIPPED | OUTPATIENT
Start: 2020-03-11 | End: 2020-05-13

## 2020-03-11 NOTE — TELEPHONE ENCOUNTER
Patient has upcoming/ pending appointment:  Next 5 appointments (look out 90 days)    Mar 16, 2020  3:45 PM CDT  SHORT with David Dotson MD  [facility] [address] [facility] [address] [phone]          Rx refilled per [facility] refill protocol.    Farida Silva BSN, RN

## 2020-03-11 NOTE — TELEPHONE ENCOUNTER
"Next 5 appointments (look out 90 days)    Mar 16, 2020  3:45 PM CDT  SHORT with David Dotson MD  [facility] ([facility]) [address] [facility] [address] [phone]        Requested Prescriptions   Signed Prescriptions Disp Refills    buPROPion (WELLBUTRIN SR) 150 MG 12 hr tablet 90 tablet 0     Sig: TAKE 1 TABLET BY MOUTH DAILY BEFORE LUNCH FOR DEPRESSION, ENERGY AND WEIGHT LOSS       SSRIs Protocol Failed - 3/9/2020  4:48 PM        Failed - PHQ-9 score less than 5 in past 6 months     Please review last PHQ-9 score.           Passed - Medication is Bupropion     If the medication is Bupropion (Wellbutrin), and the patient is taking for smoking cessation; OK to refill.          Passed - Medication is active on med list        Passed - Patient is age 18 or older        Passed - No active pregnancy on record        Passed - No positive pregnancy test in last 12 months        Passed - Recent (6 mo) or future (30 days) visit within the authorizing provider's specialty     Patient had office visit in the last 6 months or has a visit in the next 30 days with authorizing provider or within the authorizing provider's specialty.  See \"Patient Info\" tab in inbasket, or \"Choose Columns\" in Meds & Orders section of the refill encounter.                 "

## 2020-03-16 ENCOUNTER — MEDICAL CORRESPONDENCE (OUTPATIENT)
Dept: HEALTH INFORMATION MANAGEMENT | Facility: CLINIC | Age: 75
End: 2020-03-16

## 2020-03-17 ENCOUNTER — TELEPHONE (OUTPATIENT)
Dept: FAMILY MEDICINE | Facility: CLINIC | Age: 75
End: 2020-03-17

## 2020-03-17 DIAGNOSIS — F33.1 MAJOR DEPRESSIVE DISORDER, RECURRENT EPISODE, MODERATE (H): ICD-10-CM

## 2020-03-17 DIAGNOSIS — J45.40 MODERATE PERSISTENT ASTHMA WITHOUT COMPLICATION: ICD-10-CM

## 2020-03-17 DIAGNOSIS — E11.22 TYPE 2 DIABETES MELLITUS WITH STAGE 3 CHRONIC KIDNEY DISEASE, WITHOUT LONG-TERM CURRENT USE OF INSULIN (H): Primary | ICD-10-CM

## 2020-03-17 DIAGNOSIS — N18.30 TYPE 2 DIABETES MELLITUS WITH STAGE 3 CHRONIC KIDNEY DISEASE, WITHOUT LONG-TERM CURRENT USE OF INSULIN (H): Primary | ICD-10-CM

## 2020-03-17 DIAGNOSIS — I50.30 HEART FAILURE WITH PRESERVED EJECTION FRACTION, UNSPECIFIED HF CHRONICITY (H): ICD-10-CM

## 2020-03-17 NOTE — TELEPHONE ENCOUNTER
Wow. There is lot going ok here with patient and she missed appointment yesterday for uncontrolled diabetes. Hold norvasc (amlopodine) for now and I'm ok with care coordination to see (if ok with patient's insurance?). Need to be seen for tune up in clinic with multiple severe medical issues. David Dotson MD

## 2020-03-17 NOTE — TELEPHONE ENCOUNTER
"Patient c/o low blood pressure.  Checked bp btwn 12 and 1pm; 97/61 left arm heart rate 74, 107/57 right arm heart rate 64.,  Patient states that she is having some dizziness when she gets up from sitting to standing/walking. Doesn't feel like is blacking out.  states \"Just a little dizzy\".  No chest pain or shortness of breath. Speech is clear.  She is oriented x3.  States her normal blood pressures are 130's/80.  She is prone to falls.  States fell 7x last year and once this year.  She states is also a diabetic. Her BS was 229 fasting at 12 noon.  Had had 10 low fat cookies/wafers at 5am this morning.  She states usually doesn't go to bed til 5am and then sleeps til noon.  (watches movies all night)   She did not take her lisinopril 20mg today at 1pm; did take it last night.  Didn't take her amlodipine 5mg today; typically takes at 1pm.  She DID take her furosemide 40mg today at 1pm.   I had her recheck her bp now at 2:30.  It is 97/61 with HR 79 left arm sitting.   She had a healthy choice meal today at 1:30pm.  Is going to pascal herself some eggs.  States eats \"brunch\" around 1:30pm.  Takes her metformin 1000mg at 1:30pm (her morning) and 500mg at 10pm with food.  She does have a walker and will use it at this time with all ambulation with recent dizziness/low bp.  She lives in an assisted living but moving to [facility] to independent living.  She states has already ordered a medical alert button.    She states took her meds last night/early this morning at 3am.  She states doesn't have a \"normal time\" to take her meds.  States she takes them when she remembers and tries to make sure it's more than 6hrs apart.  She states that she had had a machine that her pills were set up in and it alarmed to let her know when pills were due (2 years ago)  States home care took it back with them when they were done.    Please advise as to her low bp concerns.  She is also interested in care coordination to help her with possibly " getting a schedule set and a way to remember to take her meds.   Please advise.  Denisha King RN

## 2020-03-17 NOTE — TELEPHONE ENCOUNTER
Patient informed of provider note and to stop the Amlodipine.   Follow up appointment scheduled with Dr. David Dotson 3/24    Mattie PRINCEN, RN, CPN

## 2020-03-17 NOTE — TELEPHONE ENCOUNTER
Patient calling with low blood pressure 61/97 in Left arm and 107/67 in R arm with Dizziness. Sending caller to triage.

## 2020-03-18 ENCOUNTER — PATIENT OUTREACH (OUTPATIENT)
Dept: CARE COORDINATION | Facility: CLINIC | Age: 75
End: 2020-03-18

## 2020-03-18 ASSESSMENT — ACTIVITIES OF DAILY LIVING (ADL): DEPENDENT_IADLS:: COOKING;LAUNDRY;SHOPPING;MEAL PREPARATION;TRANSPORTATION;CLEANING

## 2020-03-18 NOTE — PROGRESS NOTES
Clinic Care Coordination Contact    Care team referral: Complex Medical Concerns/Education: Compliance with medical treatment plan     Patient is currently open to [facility], phone: [phone].  is [place]. CC RN left a message and is waiting a return call from agency.     Plan: await return call from nurse . CC RN will try reaching out again in 1 business day.     TINO Garcia RN Clinic Care Coordinator   [place], [place], [place]  Phone: [phone]

## 2020-03-19 SDOH — ECONOMIC STABILITY: INCOME INSECURITY: HOW HARD IS IT FOR YOU TO PAY FOR THE VERY BASICS LIKE FOOD, HOUSING, MEDICAL CARE, AND HEATING?: NOT VERY HARD

## 2020-03-19 SDOH — ECONOMIC STABILITY: TRANSPORTATION INSECURITY
IN THE PAST 12 MONTHS, HAS LACK OF TRANSPORTATION KEPT YOU FROM MEETINGS, WORK, OR FROM GETTING THINGS NEEDED FOR DAILY LIVING?: NO

## 2020-03-19 SDOH — ECONOMIC STABILITY: FOOD INSECURITY: WITHIN THE PAST 12 MONTHS, THE FOOD YOU BOUGHT JUST DIDN'T LAST AND YOU DIDN'T HAVE MONEY TO GET MORE.: NEVER TRUE

## 2020-03-19 SDOH — ECONOMIC STABILITY: FOOD INSECURITY: WITHIN THE PAST 12 MONTHS, YOU WORRIED THAT YOUR FOOD WOULD RUN OUT BEFORE YOU GOT MONEY TO BUY MORE.: NEVER TRUE

## 2020-03-19 SDOH — HEALTH STABILITY: PHYSICAL HEALTH: ON AVERAGE, HOW MANY DAYS PER WEEK DO YOU ENGAGE IN MODERATE TO STRENUOUS EXERCISE (LIKE A BRISK WALK)?: 0 DAYS

## 2020-03-19 SDOH — ECONOMIC STABILITY: TRANSPORTATION INSECURITY
IN THE PAST 12 MONTHS, HAS THE LACK OF TRANSPORTATION KEPT YOU FROM MEDICAL APPOINTMENTS OR FROM GETTING MEDICATIONS?: NO

## 2020-03-19 NOTE — PROGRESS NOTES
Clinic Care Coordination Contact    Clinic Care Coordination Contact  OUTREACH    Referral Information:  Referral Source: Care Team    Primary Diagnosis: Other (include Comment box)(HTN)    Chief Complaint   Patient presents with     Care Team        [place] Utilization: Clinic Utilization  Difficulty keeping appointments:: No  Compliance Concerns: No  No-Show Concerns: No  No PCP office visit in Past Year: No  Utilization    Last refreshed: 3/19/2020 10:30 AM:  Hospital Admissions 0           Last refreshed: 3/19/2020 10:30 AM:  ED Visits 0           Last refreshed: 3/19/2020 10:30 AM:  No Show Count (past year) 3              Current as of: 3/19/2020 10:30 AM              Clinical Concerns:  Current Medical Concerns:  [facility] home care nurse called CC RN back. Patient was opened to home care 2/10/20. 2/12 OT was out to see patient for start of care, patient did not allow OT to enter, patient spoke with them through the door. After this visit patient declined further physical therapy or OT and requested to be discharged from those services. Nursing had visit for 2/18, they were not allowed to enter. Patients [facility] is currently under lock down due to novel COVID19 and home care skilled nursing visits are being denied by facility. S[facility] cannot schedule a nurse to go see patient at this time.   Called patient, she normally gets up at 11, check blood sugar and blood pressure, breakfast then pills. She sets up her pills in AM and PM pill boxes. She checks her home meds to her most recent med list from clinic. We discussed purchasing an automated pill dispenser, and MTM referral. Patient can use internet and will look into this. Patient interested in the [employer] phone is [phone]. Denied MTM, had this service in the past. Moving next month from [facility] to [facility]. Daughter  For support, and for transportation. Facility and family provide meals. Patient has  "appointment  With [facility] for next month as advised. Seeing [facility] heart and vascular every 6 months in person and sending in pacemaker checks every 3 months.   Patient denies low blood pressure and dizziness today after holding amlodipine. Blood pressure was \"127/70 something\" patient has elevated A1c, patient didn't want to change her diet. Once she moves, they offer both low salt and diabatic friendly meals.     Current Behavioral Concerns: as above    Education Provided to patient: care coordination services and as above.    Pain  Pain (GOAL):: Yes  Type: Chronic (>3mo)  Location of chronic pain:: legs  Radiating: No  Progression: Improving  Alleviating Factors: Rest, Pain Medication  Aggravating Factors: Activity  Health Maintenance Reviewed: Not assessed  Clinical Pathway: None    Medication Management:  PCP please see notes under plan for med discrepancy. With the addition of alarms to taking meds, patient verbalized understanding of her medications.      Functional Status:  Dependent ADLs:: Ambulation-walker  Dependent IADLs:: Cooking, Laundry, Shopping, Meal Preparation, Transportation, Cleaning  Bed or wheelchair confined:: No  Mobility Status: Independent w/Device    Living Situation:  Current living arrangement:: I live alone([facility])  Type of residence:: Independent Senior Living([facility])    Lifestyle & Psychosocial Needs:  Lifestyle     Physical activity     Days per week: 0 days     Minutes per session: Not on file     Stress: Not on file     Social Needs     Financial resource strain: Not very hard     Food insecurity     Worry: Never true     Inability: Never true     Transportation needs     Medical: No     Non-medical: No     Diet:: Diabetic diet(low sodium)  Tube Feeding: No  Transportation means:: Family     Yarsanism or spiritual beliefs that impact treatment:: No  Mental health DX:: No  Mental health management concern (GOAL):: " No  Informal Support system:: Family   Socioeconomic History     Marital status:      Spouse name: Not on file     Number of children: 3     Years of education: 12+     Highest education level: Not on file   Occupational History     Occupation: Banker     Comment: 2006     Occupation: RETIRED      Tobacco Use     Smoking status: Never Smoker     Smokeless tobacco: Never Used   Substance and Sexual Activity     Alcohol use: Yes     Comment: monthly, one beer     Drug use: No     Sexual activity: Not Currently     Partners: Male     Birth control/protection: Female Surgical, Post-menopausal     Comment: [facility]       Resources and Interventions:  Current Resources: care team  List of home care services:: Skilled Nursing  Community Resources: None  Supplies used at home:: Diabetic Supplies  Equipment Currently Used at Home: walker, rolling, glucometer            Goals: na      Patient/Caregiver understanding: yes       Future Appointments              In 5 days David Dotson MD [facility]    In 2 months Lorelei Keller MD [facility]          Plan:   1. Patient is taking ASA 81 mg per day. CC RN advised patient this is not on her med list. Patient will discontinue.  2. Patient is taking metformin and metoprolol differently, please review med list.   3. Patient will attend her follow up visit next week.  4. No further Care Coordination needs identified at this time. Patient may be referred to Care Coordination in the future if additional needs arise.  Pt encouraged to contact Care Coordinator through the clinic if situation changes and assistance is needed. No follow-up planned.    Thank you, TINO Garcia RN Clinic Care Coordinator   [place]  Phone: [phone]

## 2020-04-02 ENCOUNTER — MEDICAL CORRESPONDENCE (OUTPATIENT)
Dept: HEALTH INFORMATION MANAGEMENT | Facility: CLINIC | Age: 75
End: 2020-04-02

## 2020-04-05 DIAGNOSIS — M79.7 FIBROMYALGIA: Primary | ICD-10-CM

## 2020-04-05 DIAGNOSIS — I50.40 CHF (CONGESTIVE HEART FAILURE), NYHA CLASS I, UNSPECIFIED FAILURE CHRONICITY, COMBINED (H): ICD-10-CM

## 2020-04-05 DIAGNOSIS — F41.1 GAD (GENERALIZED ANXIETY DISORDER): ICD-10-CM

## 2020-04-05 DIAGNOSIS — I10 ESSENTIAL HYPERTENSION WITH GOAL BLOOD PRESSURE LESS THAN 140/90: ICD-10-CM

## 2020-04-05 RX ORDER — PAROXETINE 20 MG/1
TABLET, FILM COATED ORAL
Qty: 90 TABLET | Refills: 0 | Status: SHIPPED | OUTPATIENT
Start: 2020-04-05 | End: 2020-08-17

## 2020-04-05 RX ORDER — GABAPENTIN 300 MG/1
CAPSULE ORAL
Qty: 30 CAPSULE | Refills: 0 | Status: SHIPPED | OUTPATIENT
Start: 2020-04-05 | End: 2020-08-11

## 2020-04-05 RX ORDER — FUROSEMIDE 20 MG
TABLET ORAL
Qty: 60 TABLET | Refills: 0 | Status: SHIPPED | OUTPATIENT
Start: 2020-04-05 | End: 2020-05-13

## 2020-04-17 ENCOUNTER — MEDICAL CORRESPONDENCE (OUTPATIENT)
Dept: HEALTH INFORMATION MANAGEMENT | Facility: CLINIC | Age: 75
End: 2020-04-17

## 2020-04-29 ENCOUNTER — MEDICAL CORRESPONDENCE (OUTPATIENT)
Dept: HEALTH INFORMATION MANAGEMENT | Facility: CLINIC | Age: 75
End: 2020-04-29

## 2020-05-03 DIAGNOSIS — I87.2 STASIS DERMATITIS OF BOTH LEGS: ICD-10-CM

## 2020-05-04 ENCOUNTER — VIRTUAL VISIT (OUTPATIENT)
Dept: FAMILY MEDICINE | Facility: CLINIC | Age: 75
End: 2020-05-04
Payer: COMMERCIAL

## 2020-05-04 DIAGNOSIS — L21.9 SEBORRHEIC DERMATITIS: ICD-10-CM

## 2020-05-04 DIAGNOSIS — I87.2 STASIS DERMATITIS OF BOTH LEGS: ICD-10-CM

## 2020-05-04 DIAGNOSIS — E11.22 TYPE 2 DIABETES MELLITUS WITH STAGE 3 CHRONIC KIDNEY DISEASE, WITHOUT LONG-TERM CURRENT USE OF INSULIN (H): ICD-10-CM

## 2020-05-04 DIAGNOSIS — N18.30 TYPE 2 DIABETES MELLITUS WITH STAGE 3 CHRONIC KIDNEY DISEASE, WITHOUT LONG-TERM CURRENT USE OF INSULIN (H): ICD-10-CM

## 2020-05-04 DIAGNOSIS — J45.40 MODERATE PERSISTENT ASTHMA WITHOUT COMPLICATION: ICD-10-CM

## 2020-05-04 DIAGNOSIS — M54.9 BACK PAIN, UNSPECIFIED BACK LOCATION, UNSPECIFIED BACK PAIN LATERALITY, UNSPECIFIED CHRONICITY: Primary | ICD-10-CM

## 2020-05-04 PROCEDURE — 99214 OFFICE O/P EST MOD 30 MIN: CPT | Mod: 95 | Performed by: FAMILY MEDICINE

## 2020-05-04 RX ORDER — TRIAMCINOLONE ACETONIDE 1 MG/G
CREAM TOPICAL
Qty: 60 G | Refills: 0 | Status: SHIPPED | OUTPATIENT
Start: 2020-05-04 | End: 2021-03-01

## 2020-05-04 RX ORDER — TRIAMCINOLONE ACETONIDE 1 MG/G
CREAM TOPICAL
Qty: 60 G | Refills: 0 | OUTPATIENT
Start: 2020-05-04

## 2020-05-04 RX ORDER — KETOCONAZOLE 20 MG/G
CREAM TOPICAL
Qty: 30 G | Refills: 1 | Status: SHIPPED | OUTPATIENT
Start: 2020-05-04 | End: 2021-10-11

## 2020-05-04 RX ORDER — ALBUTEROL SULFATE 90 UG/1
AEROSOL, METERED RESPIRATORY (INHALATION)
Qty: 3 INHALER | Refills: 0 | Status: SHIPPED | OUTPATIENT
Start: 2020-05-04 | End: 2021-10-11

## 2020-05-04 NOTE — PROGRESS NOTES
"Catherine Ferreira is a 74 year old female who is being evaluated via a billable telephone visit.    Follow-up copd flare in hospital x2 days for low oxygen last week. Had normal chest xray and ekg. Normal cbc, renal panel, trop and negative corona. Normal hgba1c done and overdue for dm check. On oxygen at bedtime and has oxygen monitor. Patient overall improving and more hydrated now. No fevers or chills. No antibiotics but on prednisone - on prednisone 10mg x more week. Sugars was improving off prednisone. No feet changes.   Emotionally doing ok. Family supportive.   Per discharge summary:  BRIEF HOSPITAL COURSE: This 74 y.o. female who was admitted to [facility] complaining of shortness of breath. Please see the H&P for full details. The patient was ruled out for COVID. She was treated for a COPD exacerbation with inhalers and steroids. She improved well and was discharged to home. She did require supplemental oxygen particularly at night for oxygen desaturation <88%. She was discharged with supplemental oxygen and to complete a course of prednisone. Follow up with primary physician in 3-4 days.     The patient has been notified of following:     \"This telephone visit will be conducted via a call between you and your physician/provider. We have found that certain health care needs can be provided without the need for a physical exam.  This service lets us provide the care you need with a short phone conversation.  If a prescription is necessary we can send it directly to your pharmacy.  If lab work is needed we can place an order for that and you can then stop by our lab to have the test done at a later time.    Telephone visits are billed at different rates depending on your insurance coverage. During this emergency period, for some insurers they may be billed the same as an in-person visit.  Please reach out to your insurance provider with any questions.    If during the course of the call the physician/provider " "feels a telephone visit is not appropriate, you will not be charged for this service.\"    Patient has given verbal consent for Telephone visit?  Yes    What phone number would you like to be contacted at? [phone]    How would you like to obtain your AVS? Mail a copy    Subjective     Catherine Ferreira is a 74 year old female who presents to clinic today for the following health issues:    [facility] F/u for dizziness and SOB SX.       PROBLEMS TO ADD ON...    Patient Active Problem List   Diagnosis     Hyperlipidemia with target LDL less than 100     Pacemaker     Tendency toward bleeding easily (H)     Health Care Home     Urinary incontinence     Rectocele     Cystocele, midline     Allergic rhinitis     Other constipation     Major depressive disorder, recurrent episode, moderate (H)     Essential hypertension with goal blood pressure less than 140/90     TEX (generalized anxiety disorder)     Type 2 diabetes mellitus with stage 3 chronic kidney disease, without long-term current use of insulin (H)     Gastroesophageal reflux disease with esophagitis     Moderate persistent asthma without complication     Fibromyalgia     BMI 50.0-59.9, adult (H)     Vitamin D deficiency     RLS (restless legs syndrome)     Stasis dermatitis of both legs     Seborrheic dermatitis     (HFpEF) heart failure with preserved ejection fraction (H)     History of DVT (deep vein thrombosis)     Pain syndrome, chronic     Past Surgical History:   Procedure Laterality Date     APPENDECTOMY       EP PACEMAKER  2008    Pacemaker     EP PACEMAKER  08/07/16    pacemaker replacement     HC REMOVAL GALLBLADDER       HYSTERECTOMY  1990            Social History     Tobacco Use     Smoking status: Never Smoker     Smokeless tobacco: Never Used   Substance Use Topics     Alcohol use: Yes     Comment: monthly, one beer     Family History   Problem Relation Age of Onset     Cancer Mother      Cancer Maternal Grandmother      Heart Disease " Maternal Grandmother      Cancer Maternal Grandfather      Heart Disease Paternal Grandmother            Reviewed and updated as needed this visit by Provider         Review of Systems   All other ROS negative.       Objective   Reported vitals:  There were no vitals taken for this visit.   healthy, alert and no distress  PSYCH: Alert and oriented times 3; coherent speech, normal   rate and volume, able to articulate logical thoughts, able   to abstract reason, no tangential thoughts, no hallucinations   or delusions  Her affect is normal  RESP: No cough, no audible wheezing, able to talk in full sentences  Remainder of exam unable to be completed due to telephone visits    Diagnostic Test Results:  Labs reviewed in Epic        ASSESSMENT / PLAN:  (M54.9) Back pain, unspecified back location, unspecified back pain laterality, unspecified chronicity  (primary encounter diagnosis)  Comment: stable  Plan: Menthol, Topical Analgesic, 4 % GEL        Prn. Reveiwed risks and side effects of medication      (I87.2) Stasis dermatitis of both legs  Comment: stable  Plan: triamcinolone (KENALOG) 0.1 % external cream        Prn.     (J45.40) Moderate persistent asthma without complication  Comment: imroving overall  Plan: albuterol (VENTOLIN HFA) 108 (90 Base) MCG/ACT         inhaler        Continue oxygen at bedtime. Worsening shortness of breath to er. Finish prednisone    (L21.9) Seborrheic dermatitis  Comment: stable  Plan: ketoconazole (NIZORAL) 2 % external cream            (E11.22,  N18.3) Type 2 diabetes mellitus with stage 3 chronic kidney disease, without long-term current use of insulin (H)  Comment: worse with prednisone but was better  Plan: continue metformin/diet. Return to clinic in next month for repeat hgba1c/med check/etc. Continue monitor. Expected course and warning signs reviewed. Call/email with questions/concerns         Phone call duration:  12 minutes    David Dotson MD

## 2020-05-12 DIAGNOSIS — I10 ESSENTIAL HYPERTENSION WITH GOAL BLOOD PRESSURE LESS THAN 140/90: ICD-10-CM

## 2020-05-12 DIAGNOSIS — E11.22 TYPE 2 DIABETES MELLITUS WITH STAGE 3 CHRONIC KIDNEY DISEASE, WITHOUT LONG-TERM CURRENT USE OF INSULIN (H): ICD-10-CM

## 2020-05-12 DIAGNOSIS — I50.40 CHF (CONGESTIVE HEART FAILURE), NYHA CLASS I, UNSPECIFIED FAILURE CHRONICITY, COMBINED (H): ICD-10-CM

## 2020-05-12 DIAGNOSIS — N18.30 TYPE 2 DIABETES MELLITUS WITH STAGE 3 CHRONIC KIDNEY DISEASE, WITHOUT LONG-TERM CURRENT USE OF INSULIN (H): ICD-10-CM

## 2020-05-12 DIAGNOSIS — J45.40 MODERATE PERSISTENT ASTHMA WITHOUT COMPLICATION: ICD-10-CM

## 2020-05-12 NOTE — TELEPHONE ENCOUNTER
ACT Total Scores 9/25/2018 6/13/2019 12/9/2019   ACT TOTAL SCORE - - -   ASTHMA ER VISITS - - -   ASTHMA HOSPITALIZATIONS - - -   ACT TOTAL SCORE (Goal Greater than or Equal to 20) 9 8 7   In the past 12 months, how many times did you visit the emergency room for your asthma without being admitted to the hospital? 0 2 1   In the past 12 months, how many times were you hospitalized overnight because of your asthma? 1 0 1     Creatinine   Date Value Ref Range Status   12/09/2019 1.24 (H) 0.52 - 1.04 mg/dL Final     Health Maintenance Due   Topic Date Due     DEXA  1945     URINE DRUG SCREEN  1945     MEDICARE ANNUAL WELLNESS VISIT  03/28/2017     MICROALBUMIN  05/18/2017     EYE EXAM  02/15/2018     MAMMO SCREENING  04/21/2018     DIABETIC FOOT EXAM  09/25/2019     A1C  03/09/2020     BMP  06/09/2020     ASTHMA CONTROL TEST  06/09/2020     PHQ-9  06/09/2020     Please advise on refills.  Daniela Dewitt RN

## 2020-05-13 RX ORDER — MONTELUKAST SODIUM 10 MG/1
TABLET ORAL
Qty: 90 TABLET | Refills: 1 | Status: SHIPPED | OUTPATIENT
Start: 2020-05-13 | End: 2020-12-20

## 2020-05-13 RX ORDER — GLIPIZIDE 10 MG/1
TABLET, FILM COATED, EXTENDED RELEASE ORAL
Qty: 90 TABLET | Refills: 0 | Status: SHIPPED | OUTPATIENT
Start: 2020-05-13 | End: 2020-08-11

## 2020-05-13 RX ORDER — FUROSEMIDE 20 MG
TABLET ORAL
Qty: 60 TABLET | Refills: 0 | Status: SHIPPED | OUTPATIENT
Start: 2020-05-13 | End: 2020-08-11

## 2020-05-13 RX ORDER — LISINOPRIL 20 MG/1
TABLET ORAL
Qty: 180 TABLET | Refills: 0 | Status: SHIPPED | OUTPATIENT
Start: 2020-05-13 | End: 2020-08-11

## 2020-05-13 NOTE — TELEPHONE ENCOUNTER
"Routing refill request to provider for review/approval because:  Failed protocol - see below    Requested Prescriptions   Pending Prescriptions Disp Refills    glipiZIDE (GLUCOTROL XL) 10 MG 24 hr tablet [Pharmacy Med Name: GLIPIZIDE ER 10MG TABLETS] 90 tablet 0     Sig: TAKE 1 TABLET(10 MG) BY MOUTH DAILY       Sulfonylurea Agents Failed - 5/12/2020  3:41 PM        Failed - Patient has documented A1c within the specified period of time.     If HgbA1C is 8 or greater, it needs to be on file within the past 3 months.  If less than 8, must be on file within the past 6 months.     Recent Labs   Lab Test 12/09/19  1238   A1C 8.8*             Failed - Patient has a recent creatinine (normal) within the past 12 mos.     Recent Labs   Lab Test 12/09/19  1238  04/22/15   CR 1.24*   < >  --    CRPOC  --   --  1.1    < > = values in this interval not displayed.       Ok to refill medication if creatinine is low          Passed - Medication is active on med list        Passed - Patient is age 18 or older        Passed - No active pregnancy on record        Passed - Patient has not had a positive pregnancy test within the past 12 mos.        Passed - Recent (6 mo) or future (30 days) visit within the authorizing provider's specialty     Patient had office visit in the last 6 months or has a visit in the next 30 days with authorizing provider or within the authorizing provider's specialty.  See \"Patient Info\" tab in inbasket, or \"Choose Columns\" in Meds & Orders section of the refill encounter.               Gala Morris RN  "

## 2020-06-09 ENCOUNTER — PATIENT OUTREACH (OUTPATIENT)
Dept: CARE COORDINATION | Facility: CLINIC | Age: 75
End: 2020-06-09

## 2020-06-09 DIAGNOSIS — I50.1 PULMONARY EDEMA WITH CONGESTIVE HEART FAILURE (H): Primary | ICD-10-CM
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
## 2020-06-09 DIAGNOSIS — M25.511 ACUTE PAIN OF RIGHT SHOULDER: ICD-10-CM

## 2020-06-09 NOTE — LETTER
[facility] CARE COORDINATION  [address] [facility] [address]  June 11, 2020    Catherine Ferreira  [address]   [place] [address]      Dear Catherine,    I am a clinic community health worker who works with David Dotson MD at [facility]. I have been trying to reach you recently to introduce Clinic Care Coordination and to see if there was anything I could assist you with.  Below is a description of clinic care coordination and how I can further assist you.      The clinic care coordination team is made up of a registered nurse,  and community health worker who understand the health care system. The goal of clinic care coordination is to help you manage your health and improve access to the health care system in the most efficient manner. The team can assist you in meeting your health care goals by providing education, coordinating services, strengthening the communication among your providers and supporting you with any resource needs.    Please feel free to contact me at [phone] with any questions or concerns. We are focused on providing you with the highest-quality healthcare experience possible and that all starts with you.     Sincerely,     Jessi SIMS, Community Health Worker  Clinic Care Coordination  [facility] : [place]  Phone: [phone]

## 2020-06-09 NOTE — PROGRESS NOTES
Clinic Care Coordination Contact  [facility]/Voicemail       Clinical Data: CHW Outreach  Outreach attempted x 1.  Left message on patient's voicemail with call back information and requested return call.    Plan: CHW will try to reach patient again in 1-2 business days.    Jessi SIMS Community Health Worker  Clinic Care Coordination  [facility] : [place]  Phone: [phone]    Reason for Referral: Care Transition: ED to outpatient

## 2020-06-10 ENCOUNTER — TRANSFERRED RECORDS (OUTPATIENT)
Dept: HEALTH INFORMATION MANAGEMENT | Facility: CLINIC | Age: 75
End: 2020-06-10

## 2020-06-10 NOTE — PROGRESS NOTES
Clinic Care Coordination Contact  [facility]       Clinical Data: CHW Outreach  Outreach attempted x 2. Patient was busy and asked for CHW to call her tomorrow.    Plan: CHW will try to reach patient again in 1-2 business days.      Jessi SIMS Community Health Worker  Clinic Care Coordination  [facility] : [place], [place]  Phone: [phone]        Reason for Referral: Care Transition: ED to outpatient

## 2020-06-11 NOTE — PROGRESS NOTES
Clinic Care Coordination Contact  [facility] [[email or web address]]/Voicemail       Clinical Data: CHW Outreach  Outreach attempted x 3. Left message on patient's voicemail with call back information and requested return call.    Plan: CHW will send care coordination introduction letter with care coordinator contact information and explanation of care coordination services via mail.     CHW will do no further outreaches at this time.    Jessi SIMS Community Health Worker  Clinic Care Coordination  [facility] : Willian Alba  Phone: [phone]      Reason for Referral: Care Transition: ED to outpatient

## 2020-07-05 DIAGNOSIS — E11.22 TYPE 2 DIABETES MELLITUS WITH STAGE 3 CHRONIC KIDNEY DISEASE, WITHOUT LONG-TERM CURRENT USE OF INSULIN (H): ICD-10-CM

## 2020-07-05 DIAGNOSIS — N18.30 TYPE 2 DIABETES MELLITUS WITH STAGE 3 CHRONIC KIDNEY DISEASE, WITHOUT LONG-TERM CURRENT USE OF INSULIN (H): ICD-10-CM

## 2020-07-05 NOTE — LETTER
July 7, 2020    Catherine Ferreira  [address]   [place] [address]        Dear Catherine,       We recently received a refill request for metFORMIN (GLUCOPHAGE-XR) 500 MG 24 hr tablet .  We have refilled this for a one time supply only because you are due for a:    Diabetes office visit in the next 3-4 weeks, can do non-fasting labs at this appointment      Please call at your earliest convenience so that there will not be a delay with your future refills.          Thank you,   Your [facility] Team/[facility]  [phone]

## 2020-07-06 RX ORDER — METFORMIN HCL 500 MG
TABLET, EXTENDED RELEASE 24 HR ORAL
Qty: 90 TABLET | Refills: 0 | Status: SHIPPED | OUTPATIENT
Start: 2020-07-06 | End: 2020-08-20

## 2020-07-07 NOTE — TELEPHONE ENCOUNTER
Please help set-up md appointment in next 3-4 weeks. Can do non-fasting labs at appointment. David Dotson MD

## 2020-07-12 DIAGNOSIS — F33.1 MAJOR DEPRESSIVE DISORDER, RECURRENT EPISODE, MODERATE (H): ICD-10-CM

## 2020-07-13 RX ORDER — BUPROPION HYDROCHLORIDE 150 MG/1
TABLET, EXTENDED RELEASE ORAL
Qty: 90 TABLET | Refills: 0 | Status: SHIPPED | OUTPATIENT
Start: 2020-07-13 | End: 2020-11-23

## 2020-07-13 ASSESSMENT — PATIENT HEALTH QUESTIONNAIRE - PHQ9: SUM OF ALL RESPONSES TO PHQ QUESTIONS 1-9: 7

## 2020-07-13 NOTE — TELEPHONE ENCOUNTER
"PHQ 2/8/2019 12/9/2019 7/13/2020   PHQ-9 Total Score 0 15 7   Q9: Thoughts of better off dead/self-harm past 2 weeks Not at all Not at all Not at all       Patient reports feel like something coming on in her chest.  \"feels tighter and sinuses are filling up\".  Patient slept with windows open and attributes symptoms to this.   Patient generally receives prednisone when she calls.   Patient is requesting refill of Prednisone.  Patient has not used nebulizer.    Denies cough.  \"I can tell in my voice, has slight wheeze in my chest\"    Wheeze is intermittent, but patient reports if untreated will be a significantly worse wheeze.  Afebrile.  Speaking in full clear sentences.       Patient is aware she is overdue for an appointment, however only appointments available are for same day/clinic appointments.    Please advise on appointment and refill of Bupropion and Prednisone.   Daniela Dewitt RN       "

## 2020-08-07 ENCOUNTER — DOCUMENTATION ONLY (OUTPATIENT)
Dept: FAMILY MEDICINE | Facility: CLINIC | Age: 75
End: 2020-08-07

## 2020-08-07 DIAGNOSIS — E11.22 TYPE 2 DIABETES MELLITUS WITH STAGE 3 CHRONIC KIDNEY DISEASE, WITHOUT LONG-TERM CURRENT USE OF INSULIN (H): Primary | ICD-10-CM

## 2020-08-07 DIAGNOSIS — N18.30 TYPE 2 DIABETES MELLITUS WITH STAGE 3 CHRONIC KIDNEY DISEASE, WITHOUT LONG-TERM CURRENT USE OF INSULIN (H): Primary | ICD-10-CM

## 2020-08-07 NOTE — PROGRESS NOTES
This patient has a lab only appointment on 8/10/2020 but does not have future orders. Please review, associate diagnosis and sign pending lab orders for the upcoming appointment. [facility] is indicating this patient is also due for a drug screen. If you would like a drug screen performed please place an order in EPIC.    She has an appointment with Dr. Dotson on 8/17/2020.    Thank you,    Chery Fitch MLT ([facility])  [facility]

## 2020-08-10 DIAGNOSIS — M79.7 FIBROMYALGIA: ICD-10-CM

## 2020-08-10 DIAGNOSIS — E11.22 TYPE 2 DIABETES MELLITUS WITH STAGE 3 CHRONIC KIDNEY DISEASE, WITHOUT LONG-TERM CURRENT USE OF INSULIN (H): ICD-10-CM

## 2020-08-10 DIAGNOSIS — I50.40 CHF (CONGESTIVE HEART FAILURE), NYHA CLASS I, UNSPECIFIED FAILURE CHRONICITY, COMBINED (H): ICD-10-CM

## 2020-08-10 DIAGNOSIS — N18.30 TYPE 2 DIABETES MELLITUS WITH STAGE 3 CHRONIC KIDNEY DISEASE, WITHOUT LONG-TERM CURRENT USE OF INSULIN (H): ICD-10-CM

## 2020-08-10 DIAGNOSIS — I10 ESSENTIAL HYPERTENSION WITH GOAL BLOOD PRESSURE LESS THAN 140/90: ICD-10-CM

## 2020-08-10 LAB — HBA1C MFR BLD: 7.5 % (ref 0–5.6)

## 2020-08-10 PROCEDURE — 36415 COLL VENOUS BLD VENIPUNCTURE: CPT | Performed by: FAMILY MEDICINE

## 2020-08-10 PROCEDURE — 83036 HEMOGLOBIN GLYCOSYLATED A1C: CPT | Performed by: FAMILY MEDICINE

## 2020-08-10 PROCEDURE — 80048 BASIC METABOLIC PNL TOTAL CA: CPT | Performed by: FAMILY MEDICINE

## 2020-08-10 NOTE — LETTER
August 11, 2020      Catherine Ferreira  [address]   [place] [address]        Dear ,    Generally normal results except kidney tests a little high but blood sugars improving. Discuss labs, medications and check blood pressure at upcoming md appointment.    .David Dotson MD/amparo    Resulted Orders   Basic metabolic panel  (Ca, Cl, CO2, Creat, Gluc, K, Na, BUN)   Result Value Ref Range    Sodium 140 133 - 144 mmol/L    Potassium 4.1 3.4 - 5.3 mmol/L    Chloride 104 94 - 109 mmol/L    Carbon Dioxide 30 20 - 32 mmol/L    Anion Gap 6 3 - 14 mmol/L    Glucose 178 (H) 70 - 99 mg/dL      Comment:      Non Fasting    Urea Nitrogen 24 7 - 30 mg/dL    Creatinine 1.45 (H) 0.52 - 1.04 mg/dL    GFR Estimate 35 (L) >60 mL/min/[1.73_m2]      Comment:      Non  GFR Calc  Starting 12/18/2018, serum creatinine based estimated GFR (eGFR) will be   calculated using the Chronic Kidney Disease Epidemiology Collaboration   (CKD-EPI) equation.      GFR Estimate If Black 41 (L) >60 mL/min/[1.73_m2]      Comment:       GFR Calc  Starting 12/18/2018, serum creatinine based estimated GFR (eGFR) will be   calculated using the Chronic Kidney Disease Epidemiology Collaboration   (CKD-EPI) equation.      Calcium 8.9 8.5 - 10.1 mg/dL   Hemoglobin A1c   Result Value Ref Range    Hemoglobin A1C 7.5 (H) 0 - 5.6 %      Comment:      Normal <5.7% Prediabetes 5.7-6.4%  Diabetes 6.5% or higher - adopted from ADA   consensus guidelines.

## 2020-08-11 LAB
ANION GAP SERPL CALCULATED.3IONS-SCNC: 6 MMOL/L (ref 3–14)
BUN SERPL-MCNC: 24 MG/DL (ref 7–30)
CALCIUM SERPL-MCNC: 8.9 MG/DL (ref 8.5–10.1)
CHLORIDE SERPL-SCNC: 104 MMOL/L (ref 94–109)
CO2 SERPL-SCNC: 30 MMOL/L (ref 20–32)
CREAT SERPL-MCNC: 1.45 MG/DL (ref 0.52–1.04)
GFR SERPL CREATININE-BSD FRML MDRD: 35 ML/MIN/{1.73_M2}
GLUCOSE SERPL-MCNC: 178 MG/DL (ref 70–99)
POTASSIUM SERPL-SCNC: 4.1 MMOL/L (ref 3.4–5.3)
SODIUM SERPL-SCNC: 140 MMOL/L (ref 133–144)

## 2020-08-11 RX ORDER — LISINOPRIL 20 MG/1
TABLET ORAL
Qty: 180 TABLET | Refills: 0 | Status: SHIPPED | OUTPATIENT
Start: 2020-08-11 | End: 2020-12-20

## 2020-08-11 RX ORDER — FUROSEMIDE 20 MG
TABLET ORAL
Qty: 60 TABLET | Refills: 0 | Status: SHIPPED | OUTPATIENT
Start: 2020-08-11 | End: 2020-08-17

## 2020-08-11 RX ORDER — GABAPENTIN 300 MG/1
CAPSULE ORAL
Qty: 30 CAPSULE | Refills: 0 | Status: SHIPPED | OUTPATIENT
Start: 2020-08-11 | End: 2020-12-20

## 2020-08-11 RX ORDER — GLIPIZIDE 10 MG/1
TABLET, FILM COATED, EXTENDED RELEASE ORAL
Qty: 90 TABLET | Refills: 0 | Status: SHIPPED | OUTPATIENT
Start: 2020-08-11 | End: 2020-12-20

## 2020-08-11 NOTE — TELEPHONE ENCOUNTER
Routing refill request to provider for review/approval because:  Drug not on the [facility] refill protocol   Labs out of range:    Creatinine   Date Value Ref Range Status   08/10/2020 1.45 (H) 0.52 - 1.04 mg/dL Final     Daniela Dewitt RN

## 2020-08-14 ENCOUNTER — TELEPHONE (OUTPATIENT)
Dept: FAMILY MEDICINE | Facility: CLINIC | Age: 75
End: 2020-08-14

## 2020-08-17 ENCOUNTER — VIRTUAL VISIT (OUTPATIENT)
Dept: FAMILY MEDICINE | Facility: CLINIC | Age: 75
End: 2020-08-17
Payer: COMMERCIAL

## 2020-08-17 ENCOUNTER — TELEPHONE (OUTPATIENT)
Dept: FAMILY MEDICINE | Facility: CLINIC | Age: 75
End: 2020-08-17

## 2020-08-17 DIAGNOSIS — E11.22 TYPE 2 DIABETES MELLITUS WITH STAGE 3 CHRONIC KIDNEY DISEASE, WITHOUT LONG-TERM CURRENT USE OF INSULIN (H): ICD-10-CM

## 2020-08-17 DIAGNOSIS — F41.1 GAD (GENERALIZED ANXIETY DISORDER): ICD-10-CM

## 2020-08-17 DIAGNOSIS — I50.40 CHF (CONGESTIVE HEART FAILURE), NYHA CLASS I, UNSPECIFIED FAILURE CHRONICITY, COMBINED (H): ICD-10-CM

## 2020-08-17 DIAGNOSIS — I10 ESSENTIAL HYPERTENSION WITH GOAL BLOOD PRESSURE LESS THAN 140/90: ICD-10-CM

## 2020-08-17 DIAGNOSIS — M62.81 GENERALIZED MUSCLE WEAKNESS: ICD-10-CM

## 2020-08-17 DIAGNOSIS — I50.30 HEART FAILURE WITH PRESERVED EJECTION FRACTION, UNSPECIFIED HF CHRONICITY (H): Primary | ICD-10-CM

## 2020-08-17 DIAGNOSIS — N18.30 TYPE 2 DIABETES MELLITUS WITH STAGE 3 CHRONIC KIDNEY DISEASE, WITHOUT LONG-TERM CURRENT USE OF INSULIN (H): ICD-10-CM

## 2020-08-17 PROCEDURE — 99214 OFFICE O/P EST MOD 30 MIN: CPT | Mod: 95 | Performed by: FAMILY MEDICINE

## 2020-08-17 RX ORDER — FUROSEMIDE 20 MG
TABLET ORAL
Qty: 180 TABLET | Refills: 1 | Status: SHIPPED | OUTPATIENT
Start: 2020-08-17 | End: 2021-02-19

## 2020-08-17 RX ORDER — AMLODIPINE BESYLATE 5 MG/1
TABLET ORAL
Qty: 90 TABLET | Refills: 1 | Status: SHIPPED | OUTPATIENT
Start: 2020-08-17 | End: 2021-02-19

## 2020-08-17 RX ORDER — PAROXETINE 20 MG/1
TABLET, FILM COATED ORAL
Qty: 90 TABLET | Refills: 1 | Status: SHIPPED | OUTPATIENT
Start: 2020-08-17 | End: 2021-06-09

## 2020-08-17 NOTE — TELEPHONE ENCOUNTER
The patient had a telephone visit today 8/17/20 with Dr. Dotson and he wrote an DME order for a lift chair.  I spoke to the patient and she wants the order mailed to her home address.  I verified her home address and I mailed the DME order to her.  Gosia Lopez,

## 2020-08-17 NOTE — PROGRESS NOTES
"Catherine Ferreira is a 74 year old female who is being evaluated via a billable telephone visit.    Follow-up htn, dm, asthma, high cholesterol and depression/anxiety.  Outside blood pressure running. No fevers or chills. Blood sugars. Lives in senior [facility] and talking to children regularly on phone. Breathing overall stable except hot/humid weather. No blood sugars <50 or LOSS OF CONSCIENCE. No chest pain. No gerd symptoms. protonix once daily. No dysphagia. No black/bloody stools. No feet changes. Good support system and emotionally doing ok.   The patient has been notified of following:     \"This telephone visit will be conducted via a call between you and your physician/provider. We have found that certain health care needs can be provided without the need for a physical exam.  This service lets us provide the care you need with a short phone conversation.  If a prescription is necessary we can send it directly to your pharmacy.  If lab work is needed we can place an order for that and you can then stop by our lab to have the test done at a later time.    Telephone visits are billed at different rates depending on your insurance coverage. During this emergency period, for some insurers they may be billed the same as an in-person visit.  Please reach out to your insurance provider with any questions.    If during the course of the call the physician/provider feels a telephone visit is not appropriate, you will not be charged for this service.\"    Patient has given verbal consent for Telephone visit?  Yes    What phone number would you like to be contacted at? [phone]    How would you like to obtain your AVS? Mail a copy    Subjective     Catherine Ferreira is a 74 year old female who presents via phone visit today for the following health issues:    HPI  Recheck medication, refills as needed        Reviewed and updated as needed this visit by Provider         Review of Systems   All other ROS negative.       Objective "          Vitals:  No vitals were obtained today due to virtual visit.    healthy, alert and no distress  PSYCH: Alert and oriented times 3; coherent speech, normal   rate and volume, able to articulate logical thoughts, able   to abstract reason, no tangential thoughts, no hallucinations   or delusions  Her affect is normal  RESP: No cough, no audible wheezing, able to talk in full sentences  Remainder of exam unable to be completed due to telephone visits    Diagnostic Test Results:  Labs reviewed in Epic      ASSESSMENT / PLAN:  (I50.30) Heart failure with preserved ejection fraction, unspecified HF chronicity (H)  (primary encounter diagnosis)  Comment: stable  Plan: order for DME            (I10) Essential hypertension with goal blood pressure less than 140/90  Comment: stable  Plan: amLODIPine (NORVASC) 5 MG tablet, furosemide         (LASIX) 20 MG tablet        Continue self-monitor. Recheck in 4 months      (I50.40) CHF (congestive heart failure), NYHA class I, unspecified failure chronicity, combined (H)  Comment: stable  Plan: furosemide (LASIX) 20 MG tablet        Continue monitor weights. Reveiwed risks and side effects of medication      (Z68.43) BMI 50.0-59.9, adult (H)  Comment: needs help  Plan: diet.     (E11.22,  N18.3) Type 2 diabetes mellitus with stage 3 chronic kidney disease, without long-term current use of insulin (H)  Comment: stable  Plan: blood glucose monitoring (NO BRAND SPECIFIED)         meter device kit        Continue meds. Recheck in 4 months  New monitor.     (F41.1) TEX (generalized anxiety disorder)  Comment: stable  Plan: PARoxetine (PAXIL) 20 MG tablet        Reveiwed risks and side effects of medication  If SUICIAL IDEATION OR HOMOCIDAL IDEATION OR WAYNE TO ER    (M62.81) Generalized muscle weakness  Comment: needs lift chair with edema/chf and morbid obesity  Plan: order for DME        Might need p.t. but with covid it's difficult         Phone call duration:  23  sunita Dotson MD

## 2020-08-20 ENCOUNTER — TELEPHONE (OUTPATIENT)
Dept: FAMILY MEDICINE | Facility: CLINIC | Age: 75
End: 2020-08-20

## 2020-08-20 DIAGNOSIS — N18.30 TYPE 2 DIABETES MELLITUS WITH STAGE 3 CHRONIC KIDNEY DISEASE, WITHOUT LONG-TERM CURRENT USE OF INSULIN (H): ICD-10-CM

## 2020-08-20 DIAGNOSIS — E11.22 TYPE 2 DIABETES MELLITUS WITH STAGE 3 CHRONIC KIDNEY DISEASE, WITHOUT LONG-TERM CURRENT USE OF INSULIN (H): ICD-10-CM

## 2020-08-20 RX ORDER — METFORMIN HCL 500 MG
TABLET, EXTENDED RELEASE 24 HR ORAL
Qty: 270 TABLET | Refills: 0 | Status: SHIPPED | OUTPATIENT
Start: 2020-08-20 | End: 2020-12-07

## 2020-08-20 NOTE — TELEPHONE ENCOUNTER
Reason for Call:  Medication or medication refill:    Do you use a [facility]?  Name of the pharmacy and phone number for the current request: [facility] [phone]    Name of the medication requested: metformin        Can we leave a detailed message on this number? YES    Phone number patient can be reached at: Cell number on file:    Telephone Information:   Mobile [phone]       Best Time: any      Call taken on 8/20/2020 at 10:20 AM by Sommer Guzman

## 2020-08-20 NOTE — TELEPHONE ENCOUNTER
Left message on pt vm that refill was sent to pharmacy.  Refilled per RN refill protocol.  Farida PRINCEN, RN

## 2020-09-09 ENCOUNTER — TELEPHONE (OUTPATIENT)
Dept: FAMILY MEDICINE | Facility: CLINIC | Age: 75
End: 2020-09-09

## 2020-09-09 ENCOUNTER — VIRTUAL VISIT (OUTPATIENT)
Dept: FAMILY MEDICINE | Facility: CLINIC | Age: 75
End: 2020-09-09
Payer: COMMERCIAL

## 2020-09-09 DIAGNOSIS — F33.1 MAJOR DEPRESSIVE DISORDER, RECURRENT EPISODE, MODERATE (H): Primary | ICD-10-CM

## 2020-09-09 DIAGNOSIS — N30.00 ACUTE CYSTITIS WITHOUT HEMATURIA: Primary | ICD-10-CM

## 2020-09-09 PROCEDURE — 99213 OFFICE O/P EST LOW 20 MIN: CPT | Mod: 95 | Performed by: NURSE PRACTITIONER

## 2020-09-09 RX ORDER — NITROFURANTOIN 25; 75 MG/1; MG/1
100 CAPSULE ORAL 2 TIMES DAILY
Qty: 14 CAPSULE | Refills: 0 | Status: SHIPPED | OUTPATIENT
Start: 2020-09-09 | End: 2020-09-16

## 2020-09-09 ASSESSMENT — ENCOUNTER SYMPTOMS
DIFFICULTY URINATING: 0
FREQUENCY: 1
CHILLS: 0
DYSURIA: 1
FEVER: 0
HEMATURIA: 0

## 2020-09-09 NOTE — PROGRESS NOTES
"Catherine Ferreira is a 74 year old female who is being evaluated via a billable telephone visit.      The patient has been notified of following:     \"This telephone visit will be conducted via a call between you and your physician/provider. We have found that certain health care needs can be provided without the need for a physical exam.  This service lets us provide the care you need with a short phone conversation.  If a prescription is necessary we can send it directly to your pharmacy.  If lab work is needed we can place an order for that and you can then stop by our lab to have the test done at a later time.    Telephone visits are billed at different rates depending on your insurance coverage. During this emergency period, for some insurers they may be billed the same as an in-person visit.  Please reach out to your insurance provider with any questions.    If during the course of the call the physician/provider feels a telephone visit is not appropriate, you will not be charged for this service.\"    Patient has given verbal consent for Telephone visit?  Yes    What phone number would you like to be contacted at? [phone]     How would you like to obtain your AVS? Mail a copy    Subjective     Catherine Ferreira is a 74 year old female who presents via phone visit today for the following health issues:    HPI    Genitourinary - Female  Onset/Duration: 2 weeks  Description:   Painful urination (Dysuria): YES           Frequency: YES  Blood in urine (Hematuria): no  Delay in urine (Hesitency): no  Intensity: 8/10  Progression of Symptoms:  worsening and constant  Accompanying Signs & Symptoms:  Fever/chills: no  Flank pain: YES  Nausea and vomiting: no  Vaginal symptoms: none  Abdominal/Pelvic Pain: YES left side  History:   History of frequent UTI s: no  History of kidney stones: no  Sexually Active: no  Possibility of pregnancy: No  Precipitating or alleviating factors: None  Therapies tried and " outcome: Increase fluid intake           Review of Systems   Constitutional: Negative for chills and fever.   Genitourinary: Positive for dysuria and frequency. Negative for difficulty urinating, hematuria and pelvic pain.           Objective          Vitals:  No vitals were obtained today due to virtual visit.    healthy, alert and no distress  PSYCH: Alert and oriented times 3; coherent speech, normal   rate and volume, able to articulate logical thoughts, able   to abstract reason, no tangential thoughts, no hallucinations   or delusions  Her affect is normal  RESP: No cough, no audible wheezing, able to talk in full sentences  Remainder of exam unable to be completed due to telephone visits          Assessment/Plan:    Assessment & Plan     1. Acute cystitis without hematuria  - nitroFURantoin macrocrystal-monohydrate (MACROBID) 100 MG capsule; Take 1 capsule (100 mg) by mouth 2 times daily for 7 days  Dispense: 14 capsule; Refill: 0         Return in about 1 week (around 9/16/2020) for worsening symptoms or failure to improve.    DOUGIE Monterroso [facility]    Phone call duration:  5 minutes

## 2020-09-09 NOTE — TELEPHONE ENCOUNTER
Patient had virtual visit 8/17 with Dr. David Dotson  Patient taking Bupropion 150mg  Patient states doesn't get out of apartment, not supposed to leave her room.   Gets lonely, unable to leave or have visitors  Patient is concerned about winter coming and usually gets increased depression  She currently sleeps all day and and awake all night, states Paroxetine doesn't help    Does she need to be seen?   Increase one of her medications?    Please advise      Mattie PRINCEN, RN, CPN

## 2020-09-10 NOTE — TELEPHONE ENCOUNTER
Lets have patient seen psychology for help. Please notify and give # but I think they call her. David Dotson MD

## 2020-09-10 NOTE — TELEPHONE ENCOUNTER
Patient informed of provider note below and referral to psychology  As talking with patient, she stated she is having heaviness in her chest and audible wheezing. Patient did state she is still in bed, has been using her inhaler with no relief  Instructed patient needs to go to ED now for further evaluation      Mattie PRINCEN, RN, CPN

## 2020-09-21 ENCOUNTER — PATIENT OUTREACH (OUTPATIENT)
Dept: CARE COORDINATION | Facility: CLINIC | Age: 75
End: 2020-09-21

## 2020-09-21 DIAGNOSIS — J44.9 CHRONIC OBSTRUCTIVE PULMONARY DISEASE, UNSPECIFIED COPD TYPE (H): Primary | ICD-10-CM

## 2020-09-21 DIAGNOSIS — Z51.5 PALLIATIVE CARE BY SPECIALIST: ICD-10-CM

## 2020-09-21 DIAGNOSIS — I50.33 ACUTE ON CHRONIC DIASTOLIC HEART FAILURE (H): ICD-10-CM

## 2020-09-21 NOTE — PROGRESS NOTES
Clinic Care Coordination Contact  Care Coordination Transition Communication    Clinical Data:   [facility]   Admission Date: 9/10/2020  Discharge Date: 9/19/2020  Discharge Plan: Catherine Ferreira was discharged to transitional care unit.  Principal Diagnosis   Acute on chronic diastolic heart failure  Acute on chronic hypercarbic respiratory failure    Transition to Facility:              Facility Name: [facility] [phone]               Contact name and phone number/fax: Shazia BERG     Plan: RN/SW Care Coordinator will await notification from facility staff informing RN/SW Care Coordinator of patient's discharge plans/needs. RN/SW Care Coordinator will review chart and outreach to facility staff every 4 weeks and as needed.     TINO Garcia RN Clinic Care Coordinator   [place], [place], [place]  Phone: [phone]

## 2020-10-19 ENCOUNTER — PATIENT OUTREACH (OUTPATIENT)
Dept: CARE COORDINATION | Facility: CLINIC | Age: 75
End: 2020-10-19

## 2020-10-19 NOTE — PROGRESS NOTES
Clinic Care Coordination Contact  [facility]/Voicemail    Referral Source: [facility]  Upon chart review patient has been discharged from TCU back to independent senior living apartment with home care services.  Clinical Data: Care Coordinator Outreach  Outreach attempted x 1.  Left message on patient's voicemail with call back information and requested return call.  Plan: Care Coordinator will try to reach patient again in 1-2 business days.    Melissa Behl BSN, RN, PHN, CCM  Primary Care Clinical RN Care Coordinator  [facility]   [phone]

## 2020-10-19 NOTE — LETTER
[facility] CARE COORDINATION  [address] [place] [address]   October 21, 2020    Catherine Ferreira  [address]   [place] [address]      Dear Catherine,    I am a clinic care coordinator who works with David Dotson MD at [facility]. I recently tried to call and was unable to reach you. Below is a description of clinic care coordination and how I can further assist you.      The clinic care coordination team is made up of a registered nurse,  and community health worker who understand the health care system. The goal of clinic care coordination is to help you manage your health and improve access to the health care system in the most efficient manner. The team can assist you in meeting your health care goals by providing education, coordinating services, strengthening the communication among your providers and supporting you with any resource needs.    Please feel free to contact the Community Health Worker Jessi at [phone] with any questions or concerns. We are focused on providing you with the highest-quality healthcare experience possible and that all starts with you.     Sincerely,     Melissa Behl BSN, RN, PHN, CCM  Primary Care Clinical RN Care Coordinator  [facility]

## 2020-10-20 NOTE — PROGRESS NOTES
Clinic Care Coordination Contact  [facility]/Voicemail    Referral Source: [facility]  Patient left a voicemail for writer on 10/19/20 at 5:27 pm.  Clinical Data: Care Coordinator Outreach  Outreach attempted x 1.  Left message on patient's voicemail with call back information and requested return call.  Plan: Care Coordinator will try to reach patient again in 1-2 business days.    Melissa Behl BSN, RN, PHN, [facility]  Primary Care Clinical RN Care Coordinator  [facility]   [phone]

## 2020-10-21 NOTE — PROGRESS NOTES
Clinic Care Coordination Contact  [facility]/Voicemail    Referral Source: [facility]  Clinical Data: Care Coordinator Outreach  Outreach attempted x 2.  Left message on patient's voicemail with call back information and requested return call.  Plan: Care Coordinator will send care coordination introduction letter with care coordinator contact information and explanation of care coordination services via mail. Care Coordinator will do no further outreaches at this time.    Melissa Behl BSN, RN, PHN, CCM  Primary Care Clinical RN Care Coordinator  [facility]   [phone]

## 2020-10-27 ENCOUNTER — PATIENT OUTREACH (OUTPATIENT)
Dept: CARE COORDINATION | Facility: CLINIC | Age: 75
End: 2020-10-27

## 2020-10-27 NOTE — PROGRESS NOTES
Clinic Care Coordination Contact  [facility]/Voicemail    Referral Source: [facility]  RN CC received a call back from patient 10/26/20 at 6:30 pm.  Clinical Data: Care Coordinator Outreach  Outreach attempted x 1.  Left message on patient's voicemail with call back information and requested return call.  Plan: Care Coordinator sent care coordination introduction letter on 10/21/20 via mail. Care Coordinator will try to reach patient again in 10 business days.    Melissa Behl BSN, RN, PHN, CCM  Primary Care Clinical RN Care Coordinator  [facility]   [phone]

## 2020-11-12 ENCOUNTER — PATIENT OUTREACH (OUTPATIENT)
Dept: CARE COORDINATION | Facility: CLINIC | Age: 75
End: 2020-11-12

## 2020-11-12 NOTE — PROGRESS NOTES
Clinic Care Coordination Contact  [facility]/Voicemail       Clinical Data: Care Coordinator Outreach  Outreach attempted x 2.  Left message on patient's voicemail with call back information and requested return call.  Plan: Care Coordinator sent care coordination introduction letter on 10/21/20 via mail. Care Coordinator will do no further outreaches at this time.    Melissa Behl BSN, RN, PHN, CCM  Primary Care Clinical RN Care Coordinator  [facility]   [phone]

## 2020-11-23 DIAGNOSIS — F33.1 MAJOR DEPRESSIVE DISORDER, RECURRENT EPISODE, MODERATE (H): ICD-10-CM

## 2020-11-24 ENCOUNTER — VIRTUAL VISIT (OUTPATIENT)
Dept: FAMILY MEDICINE | Facility: CLINIC | Age: 75
End: 2020-11-24
Payer: COMMERCIAL

## 2020-11-24 ENCOUNTER — TELEPHONE (OUTPATIENT)
Dept: FAMILY MEDICINE | Facility: CLINIC | Age: 75
End: 2020-11-24

## 2020-11-24 DIAGNOSIS — J45.40 MODERATE PERSISTENT ASTHMA WITHOUT COMPLICATION: ICD-10-CM

## 2020-11-24 DIAGNOSIS — I50.30 HEART FAILURE WITH PRESERVED EJECTION FRACTION, UNSPECIFIED HF CHRONICITY (H): Primary | ICD-10-CM

## 2020-11-24 DIAGNOSIS — J45.41 MODERATE PERSISTENT ASTHMA WITH ACUTE EXACERBATION: ICD-10-CM

## 2020-11-24 DIAGNOSIS — J01.90 ACUTE NON-RECURRENT SINUSITIS, UNSPECIFIED LOCATION: ICD-10-CM

## 2020-11-24 PROCEDURE — 99442 PR PHYSICIAN TELEPHONE EVALUATION 11-20 MIN: CPT | Mod: 95 | Performed by: NURSE PRACTITIONER

## 2020-11-24 RX ORDER — ALBUTEROL SULFATE 0.83 MG/ML
SOLUTION RESPIRATORY (INHALATION)
Qty: 75 ML | Refills: 1 | Status: SHIPPED | OUTPATIENT
Start: 2020-11-24 | End: 2021-03-01

## 2020-11-24 RX ORDER — DOXYCYCLINE 100 MG/1
100 CAPSULE ORAL 2 TIMES DAILY
Qty: 10 CAPSULE | Refills: 0 | Status: SHIPPED | OUTPATIENT
Start: 2020-11-24 | End: 2021-03-01

## 2020-11-24 RX ORDER — BUPROPION HYDROCHLORIDE 150 MG/1
TABLET, EXTENDED RELEASE ORAL
Qty: 90 TABLET | Refills: 0 | Status: SHIPPED | OUTPATIENT
Start: 2020-11-24 | End: 2020-12-20

## 2020-11-24 RX ORDER — PREDNISONE 20 MG/1
20 TABLET ORAL DAILY
Qty: 5 TABLET | Refills: 0 | Status: SHIPPED | OUTPATIENT
Start: 2020-11-24 | End: 2020-11-29

## 2020-11-24 ASSESSMENT — ENCOUNTER SYMPTOMS
EYE ITCHING: 0
NAUSEA: 0
LIGHT-HEADEDNESS: 0
SHORTNESS OF BREATH: 0
FEVER: 0
COUGH: 1
DIAPHORESIS: 1
RHINORRHEA: 0
DIZZINESS: 0
CONSTIPATION: 0
WHEEZING: 1
SORE THROAT: 0
EYE DISCHARGE: 0
HEADACHES: 1
SINUS PRESSURE: 1
DIARRHEA: 0
CHILLS: 1
SINUS PAIN: 1
FATIGUE: 0

## 2020-11-24 NOTE — PROGRESS NOTES
"Catherine Ferreira is a 74 year old female who is being evaluated via a billable telephone visit.      The patient has been notified of following:     \"This telephone visit will be conducted via a call between you and your physician/provider. We have found that certain health care needs can be provided without the need for a physical exam.  This service lets us provide the care you need with a short phone conversation.  If a prescription is necessary we can send it directly to your pharmacy.  If lab work is needed we can place an order for that and you can then stop by our lab to have the test done at a later time.    Telephone visits are billed at different rates depending on your insurance coverage. During this emergency period, for some insurers they may be billed the same as an in-person visit.  Please reach out to your insurance provider with any questions.    If during the course of the call the physician/provider feels a telephone visit is not appropriate, you will not be charged for this service.\"    Patient has given verbal consent for Telephone visit?  Yes    What phone number would you like to be contacted at? [phone]    How would you like to obtain your AVS? Lexis    Subjective     Catherine Ferreira is a 74 year old female who presents via phone visit today for the following health issues:    HPI     Acute Illness  Acute illness concerns: sinus pain, wheezing, cough and headaches  Onset/Duration: 4 days  Symptoms:  Fever: no  Chills/Sweats: YES  Headache (location?): YES  Sinus Pressure: YES  Conjunctivitis:  no  Ear Pain: YES: bilateral  Rhinorrhea: no  Congestion: no  Sore Throat: no  Cough: YES-productive of clear sputum  Wheeze: YES  Decreased Appetite: no  Nausea: no  Vomiting: no  Diarrhea: no  Dysuria/Freq.: no  Dysuria or Hematuria: no  Fatigue/Achiness: no  Sick/Strep Exposure: no  Therapies tried and outcome: Mucinex       Phone call completed due to COVID-19 outbreak.     For the last 4 days has " been having sinus headaches. Has some shortness of breath, but is at baseline. Has pulse ox at home and has been 90%-92%. Is to wear oxygen at home but has not been wearing it because machine is broken. Checks blood pressure at home as well and has been normal. Is wheezing and that is worse. Has been eating and drinking more so has put on about 5 pounds in the last couple of weeks. Does not usually weigh self daily. Lives in senior apartments, this is independent living, but they check on her every AM and every PM. Has been staying in her apartment. There has not been anyone in the building with COVID. Pain to face, ears and teeth.     Review of Systems   Constitutional: Positive for chills and diaphoresis. Negative for fatigue and fever.   HENT: Positive for congestion, ear pain, sinus pressure and sinus pain. Negative for ear discharge, hearing loss, rhinorrhea and sore throat.    Eyes: Negative for discharge and itching.   Respiratory: Positive for cough and wheezing. Negative for shortness of breath.    Gastrointestinal: Negative for constipation, diarrhea and nausea.   Skin: Negative for rash.   Neurological: Positive for headaches. Negative for dizziness and light-headedness.          Objective          Vitals:  No vitals were obtained today due to virtual visit.    healthy, alert and mild distress  PSYCH: Alert and oriented times 3; coherent speech, normal   rate and volume, able to articulate logical thoughts, able   to abstract reason, no tangential thoughts, no hallucinations   or delusions  Her affect is normal and pleasant  RESP: No cough, no audible wheezing, able to talk in full sentences  Remainder of exam unable to be completed due to telephone visits          Assessment/Plan:    Assessment & Plan     Heart failure with preserved ejection fraction, unspecified HF chronicity (H)  Possible acute exacerbation.  Encouraged to weigh self daily and notify if gains 2 to 3 pounds over the next 5  days.    Moderate persistent asthma without complication  Slight audible wheezing noted during phone call.  Short burst of prednisone.  Educated on use.  Encouraged to call and get oxygen concentrator repaired.  Educated regarding warning signs to watch for and when would need to seek immediate medical attention.  - predniSONE (DELTASONE) 20 MG tablet; Take 1 tablet (20 mg) by mouth daily for 5 days  Dispense: 5 tablet; Refill: 0     Acute non-recurrent sinusitis, unspecified location  Educated on use of antibiotic.  Encouraged to take with food.  Notify if no improvement.  - doxycycline hyclate (VIBRAMYCIN) 100 MG capsule; Take 1 capsule (100 mg) by mouth 2 times daily  Dispense: 10 capsule; Refill: 0  - predniSONE (DELTASONE) 20 MG tablet; Take 1 tablet (20 mg) by mouth daily for 5 days  Dispense: 5 tablet; Refill: 0      No follow-ups on file.    DOUGIE Pathak [facility]    Phone call duration:  12 minutes

## 2020-11-24 NOTE — TELEPHONE ENCOUNTER
Routing refill request to provider for review/approval because:  PHQ-9 score:    PHQ 7/13/2020   PHQ-9 Total Score 7   Q9: Thoughts of better off dead/self-harm past 2 weeks Not at all                 Mattie PRINCEN, RN, CPN

## 2020-11-24 NOTE — TELEPHONE ENCOUNTER
Reason for Call:  Medication or medication refill:    Do you use a [facility]?  Name of the pharmacy and phone number for the current request:  [id number] - [place] - [address] [facility] AT [facility] [place]  [phone]    Name of the medication requested: albuterol (PROVENTIL) (2.5 MG/3ML) 0.083% neb solution    Other request:     Can we leave a detailed message on this number? YES    Phone number patient can be reached at: Home number on file [phone] (home)    Best Time:     Call taken on 11/24/2020 at 10:03 AM by Zenaida Menendez

## 2020-12-05 DIAGNOSIS — N18.30 TYPE 2 DIABETES MELLITUS WITH STAGE 3 CHRONIC KIDNEY DISEASE, WITHOUT LONG-TERM CURRENT USE OF INSULIN (H): ICD-10-CM

## 2020-12-05 DIAGNOSIS — E11.22 TYPE 2 DIABETES MELLITUS WITH STAGE 3 CHRONIC KIDNEY DISEASE, WITHOUT LONG-TERM CURRENT USE OF INSULIN (H): ICD-10-CM

## 2020-12-05 DIAGNOSIS — Z79.4 TYPE 2 DIABETES MELLITUS WITHOUT COMPLICATION, WITH LONG-TERM CURRENT USE OF INSULIN (H): Primary | ICD-10-CM

## 2020-12-05 DIAGNOSIS — E11.9 TYPE 2 DIABETES MELLITUS WITHOUT COMPLICATION, WITH LONG-TERM CURRENT USE OF INSULIN (H): Primary | ICD-10-CM

## 2020-12-07 RX ORDER — METFORMIN HCL 500 MG
TABLET, EXTENDED RELEASE 24 HR ORAL
Qty: 270 TABLET | Refills: 0 | Status: SHIPPED | OUTPATIENT
Start: 2020-12-07 | End: 2021-02-19

## 2020-12-07 NOTE — TELEPHONE ENCOUNTER
Routing refill request to provider for review/approval because:  Labs out of range:    Creatinine    Shelby Torres BSN, RN

## 2020-12-14 ENCOUNTER — TELEPHONE (OUTPATIENT)
Dept: FAMILY MEDICINE | Facility: CLINIC | Age: 75
End: 2020-12-14

## 2020-12-14 NOTE — TELEPHONE ENCOUNTER
This would be better for an md appointment to check lungs/oxgen state/etc. and if worsening shortness of breath to er. David Dotson MD

## 2020-12-14 NOTE — TELEPHONE ENCOUNTER
Patient has sinus congestion with draining. Would like a call to advise as prednisone is not working. Migdalia P -

## 2020-12-14 NOTE — TELEPHONE ENCOUNTER
Catherine is aware of Dr. David Dotson's instructions.  She states understanding that she is needing an office visit.  She states needs to talk with her daughter to see when she can bring her in.  She will then call back to set up appointment.  Ok to use Dr. David Dotson's same day or held visit times.  To call and leave message for  team if needing further assistance

## 2020-12-14 NOTE — TELEPHONE ENCOUNTER
Diagnosed with sinusitis/ shortness of breath 3 weeks ago and treated with prednisone and doxycycline.   Patient still has congestion in her sinuses and post nasal drip.  Patient wondering what to do next. Phone visit?    Shelby PRINCEN, RN

## 2020-12-18 DIAGNOSIS — F33.1 MAJOR DEPRESSIVE DISORDER, RECURRENT EPISODE, MODERATE (H): ICD-10-CM

## 2020-12-18 DIAGNOSIS — E11.9 TYPE 2 DIABETES MELLITUS WITHOUT COMPLICATION, UNSPECIFIED WHETHER LONG TERM INSULIN USE (H): ICD-10-CM

## 2020-12-18 DIAGNOSIS — K21.9 GASTROESOPHAGEAL REFLUX DISEASE WITHOUT ESOPHAGITIS: ICD-10-CM

## 2020-12-18 DIAGNOSIS — I10 ESSENTIAL HYPERTENSION WITH GOAL BLOOD PRESSURE LESS THAN 140/90: ICD-10-CM

## 2020-12-18 DIAGNOSIS — M79.7 FIBROMYALGIA: ICD-10-CM

## 2020-12-18 DIAGNOSIS — E11.22 TYPE 2 DIABETES MELLITUS WITH STAGE 3 CHRONIC KIDNEY DISEASE, WITHOUT LONG-TERM CURRENT USE OF INSULIN (H): ICD-10-CM

## 2020-12-18 DIAGNOSIS — E55.9 VITAMIN D DEFICIENCY: ICD-10-CM

## 2020-12-18 DIAGNOSIS — E78.5 HYPERLIPIDEMIA WITH TARGET LDL LESS THAN 100: ICD-10-CM

## 2020-12-18 DIAGNOSIS — J45.40 MODERATE PERSISTENT ASTHMA WITHOUT COMPLICATION: ICD-10-CM

## 2020-12-18 DIAGNOSIS — I50.9 CONGESTIVE HEART FAILURE, UNSPECIFIED HF CHRONICITY, UNSPECIFIED HEART FAILURE TYPE (H): Primary | ICD-10-CM

## 2020-12-18 DIAGNOSIS — N18.30 TYPE 2 DIABETES MELLITUS WITH STAGE 3 CHRONIC KIDNEY DISEASE, WITHOUT LONG-TERM CURRENT USE OF INSULIN (H): ICD-10-CM

## 2020-12-18 RX ORDER — OMEGA-3S/DHA/EPA/FISH OIL 300-1000MG
2 CAPSULE ORAL DAILY
Status: CANCELLED | OUTPATIENT
Start: 2020-12-18

## 2020-12-18 RX ORDER — CHOLECALCIFEROL (VITAMIN D3) 50 MCG
50 TABLET ORAL DAILY
Qty: 90 TABLET | Refills: 1 | Status: SHIPPED | OUTPATIENT
Start: 2020-12-18 | End: 2021-10-27

## 2020-12-18 RX ORDER — PANTOPRAZOLE SODIUM 40 MG/1
40 TABLET, DELAYED RELEASE ORAL DAILY
Qty: 90 TABLET | Refills: 1 | Status: SHIPPED | OUTPATIENT
Start: 2020-12-18 | End: 2021-07-06

## 2020-12-18 NOTE — TELEPHONE ENCOUNTER
**Fax also received from pharmacy with message:    Request for Pantoprazole 40mg every day, Omega 3 fatty acids every day, Melatonin 5mg at bedtime, Vitamin D3 2000 every day. Please make this [facility] her primary for we will be providing blister packs to her.     *Please pend/order Melatonin if approrpiate. NX

## 2020-12-18 NOTE — TELEPHONE ENCOUNTER
Routing refill request to provider for review/approval because:  Drug not on the [facility] refill protocol gabapentin  Labs not current:  Lipids, cr, platelets, cbc, alt  Medication is reported/historical 0mega 3, ropinirole, eliquis, metoprolol   Pt PHQ-9 and ACT are not up to date.  Farida Silva BSN, RN

## 2020-12-20 RX ORDER — GABAPENTIN 300 MG/1
CAPSULE ORAL
Qty: 30 CAPSULE | Refills: 0 | Status: SHIPPED | OUTPATIENT
Start: 2020-12-20 | End: 2021-03-29

## 2020-12-20 RX ORDER — ROPINIROLE 0.5 MG/1
TABLET, FILM COATED ORAL
Refills: 0 | OUTPATIENT
Start: 2020-12-20

## 2020-12-20 RX ORDER — APIXABAN 5 MG/1
TABLET, FILM COATED ORAL
Qty: 60 TABLET | Refills: 0 | Status: SHIPPED | OUTPATIENT
Start: 2020-12-20 | End: 2021-03-30

## 2020-12-20 RX ORDER — ATORVASTATIN CALCIUM 80 MG/1
TABLET, FILM COATED ORAL
Qty: 90 TABLET | Refills: 0 | Status: SHIPPED | OUTPATIENT
Start: 2020-12-20 | End: 2021-04-28

## 2020-12-20 RX ORDER — GLIPIZIDE 10 MG/1
TABLET, FILM COATED, EXTENDED RELEASE ORAL
Qty: 30 TABLET | Refills: 0 | Status: SHIPPED | OUTPATIENT
Start: 2020-12-20 | End: 2021-02-19

## 2020-12-20 RX ORDER — METOPROLOL SUCCINATE 50 MG/1
TABLET, EXTENDED RELEASE ORAL
Qty: 90 TABLET | Refills: 0 | Status: SHIPPED | OUTPATIENT
Start: 2020-12-20 | End: 2021-07-22

## 2020-12-20 RX ORDER — BUPROPION HYDROCHLORIDE 150 MG/1
TABLET, EXTENDED RELEASE ORAL
Qty: 90 TABLET | Refills: 0 | Status: SHIPPED | OUTPATIENT
Start: 2020-12-20 | End: 2021-03-01 | Stop reason: DRUGHIGH

## 2020-12-20 RX ORDER — MONTELUKAST SODIUM 10 MG/1
TABLET ORAL
Qty: 90 TABLET | Refills: 1 | Status: SHIPPED | OUTPATIENT
Start: 2020-12-20 | End: 2021-10-27

## 2020-12-20 RX ORDER — LISINOPRIL 20 MG/1
TABLET ORAL
Qty: 180 TABLET | Refills: 0 | Status: SHIPPED | OUTPATIENT
Start: 2020-12-20 | End: 2021-03-01

## 2020-12-20 NOTE — TELEPHONE ENCOUNTER
Please call patient and help set-up md appointment in next couple weeks. Patient is very overdue for med check/labs/etc. David Dotson MD

## 2020-12-21 NOTE — TELEPHONE ENCOUNTER
Left message on patient's voicemail to set up an appointment with Dr Dotson in the next couple of weeks, for medication check.Tamia Butt MA/TC

## 2020-12-22 ENCOUNTER — TELEPHONE (OUTPATIENT)
Dept: FAMILY MEDICINE | Facility: CLINIC | Age: 75
End: 2020-12-22

## 2020-12-22 DIAGNOSIS — J45.41 MODERATE PERSISTENT ASTHMA WITH ACUTE EXACERBATION: ICD-10-CM

## 2020-12-22 RX ORDER — ALBUTEROL SULFATE 0.83 MG/ML
SOLUTION RESPIRATORY (INHALATION)
OUTPATIENT
Start: 2020-12-22

## 2020-12-22 NOTE — TELEPHONE ENCOUNTER
Patient c/o legs and feet swelling and pain as well as they itch. Has been applying triamcinolone cream. No weeping. Legs are red. Not hot to touch  Has been elevating legs with no relief.  Onset x 4 days    Instructed patient needs to be seen. Patient to go to urgent care tonight  Patient agreed    Mattie PRINCEN, RN, CPN

## 2020-12-22 NOTE — TELEPHONE ENCOUNTER
Patient called the [facility] phone number, she sates she was transferred to that phone number, MILY Joseph took the call. Patient states she has been having leg selling and pain for 4 days. Please call to advise.Tamia Butt MA/RAYSHAWN

## 2021-02-08 ENCOUNTER — TELEPHONE (OUTPATIENT)
Dept: NURSING | Facility: CLINIC | Age: 76
End: 2021-02-08

## 2021-02-10 ENCOUNTER — TELEPHONE (OUTPATIENT)
Dept: FAMILY MEDICINE | Facility: CLINIC | Age: 76
End: 2021-02-10

## 2021-02-10 NOTE — TELEPHONE ENCOUNTER
Left provider message as written on Etienne knowles. Advised to call back if any questions.  Farida PRINCEN, RN

## 2021-02-10 NOTE — TELEPHONE ENCOUNTER
If they have somebody already in place to follow up with felipe that has been following her then stick with them. Otherwise I will follow but would like a video visit or office appointment since I've not seen in a long time. David Dotson MD

## 2021-02-10 NOTE — TELEPHONE ENCOUNTER
Reason for Call:  Other call back    Detailed comments: [facility] wants to know if Dr. Dotson can follow patient for home care.      Phone Number Patient can be reached at: na    Best Time: any    Can we leave a detailed message on this number? YES    Call taken on 2/10/2021 at 2:36 PM by Abi Mota

## 2021-02-18 ENCOUNTER — TELEPHONE (OUTPATIENT)
Dept: FAMILY MEDICINE | Facility: CLINIC | Age: 76
End: 2021-02-18

## 2021-02-18 DIAGNOSIS — G25.81 RLS (RESTLESS LEGS SYNDROME): Primary | ICD-10-CM

## 2021-02-18 DIAGNOSIS — E78.5 HYPERLIPIDEMIA WITH TARGET LDL LESS THAN 100: ICD-10-CM

## 2021-02-18 NOTE — TELEPHONE ENCOUNTER
"Home care is needing the dx code for her diabetes.  Patient is seeing Dr. David Dotson tomorrow for appointment.  Home care RN is needing to know the ICD10 codes for her diabetes as per her chart.  Chart reflects  N18.30.  She states that that is for type 2 diabetes mellitus with stage 3 chronic kidney disease; she is needing to know if she is stage 3a (GFR 45-59) or stage 3b (Gfr 30-44).  Current dx states \"unspecified\" and she needs more information for required documentation.  Please advise if able.  Denisha STROUD RN  "

## 2021-02-19 ENCOUNTER — OFFICE VISIT (OUTPATIENT)
Dept: FAMILY MEDICINE | Facility: CLINIC | Age: 76
End: 2021-02-19
Payer: COMMERCIAL

## 2021-02-19 VITALS
DIASTOLIC BLOOD PRESSURE: 79 MMHG | SYSTOLIC BLOOD PRESSURE: 137 MMHG | TEMPERATURE: 97.8 F | OXYGEN SATURATION: 92 % | WEIGHT: 293 LBS | HEART RATE: 82 BPM | BODY MASS INDEX: 52.97 KG/M2

## 2021-02-19 DIAGNOSIS — F41.1 GAD (GENERALIZED ANXIETY DISORDER): ICD-10-CM

## 2021-02-19 DIAGNOSIS — N18.30 TYPE 2 DIABETES MELLITUS WITH STAGE 3 CHRONIC KIDNEY DISEASE, WITHOUT LONG-TERM CURRENT USE OF INSULIN, UNSPECIFIED WHETHER STAGE 3A OR 3B CKD (H): Primary | ICD-10-CM

## 2021-02-19 DIAGNOSIS — I10 ESSENTIAL HYPERTENSION WITH GOAL BLOOD PRESSURE LESS THAN 140/90: ICD-10-CM

## 2021-02-19 DIAGNOSIS — E11.9 TYPE 2 DIABETES MELLITUS WITHOUT COMPLICATION, WITH LONG-TERM CURRENT USE OF INSULIN (H): ICD-10-CM

## 2021-02-19 DIAGNOSIS — Z79.4 TYPE 2 DIABETES MELLITUS WITHOUT COMPLICATION, WITH LONG-TERM CURRENT USE OF INSULIN (H): ICD-10-CM

## 2021-02-19 DIAGNOSIS — F33.1 MAJOR DEPRESSIVE DISORDER, RECURRENT EPISODE, MODERATE (H): ICD-10-CM

## 2021-02-19 DIAGNOSIS — E11.22 TYPE 2 DIABETES MELLITUS WITH STAGE 3 CHRONIC KIDNEY DISEASE, WITHOUT LONG-TERM CURRENT USE OF INSULIN, UNSPECIFIED WHETHER STAGE 3A OR 3B CKD (H): Primary | ICD-10-CM

## 2021-02-19 DIAGNOSIS — I50.30 HEART FAILURE WITH PRESERVED EJECTION FRACTION, UNSPECIFIED HF CHRONICITY (H): ICD-10-CM

## 2021-02-19 PROCEDURE — 99214 OFFICE O/P EST MOD 30 MIN: CPT | Performed by: FAMILY MEDICINE

## 2021-02-19 RX ORDER — BUMETANIDE 1 MG/1
1 TABLET ORAL DAILY
Qty: 1 TABLET | Refills: 0 | COMMUNITY
Start: 2021-02-19 | End: 2021-03-01 | Stop reason: DRUGHIGH

## 2021-02-19 RX ORDER — CEPHALEXIN 500 MG/1
CAPSULE ORAL
COMMUNITY
Start: 2021-01-01 | End: 2021-03-01

## 2021-02-19 RX ORDER — BUPROPION HYDROCHLORIDE 200 MG/1
200 TABLET, EXTENDED RELEASE ORAL
Qty: 90 TABLET | Refills: 1 | Status: SHIPPED | OUTPATIENT
Start: 2021-02-19 | End: 2021-10-27

## 2021-02-19 RX ORDER — ROPINIROLE 0.5 MG/1
0.5 TABLET, FILM COATED ORAL
Qty: 90 TABLET | Refills: 0 | Status: SHIPPED | OUTPATIENT
Start: 2021-02-19 | End: 2021-06-09

## 2021-02-19 RX ORDER — METFORMIN HCL 500 MG
TABLET, EXTENDED RELEASE 24 HR ORAL
Qty: 360 TABLET | Refills: 0 | Status: SHIPPED | OUTPATIENT
Start: 2021-02-19 | End: 2021-03-01

## 2021-02-19 RX ORDER — DOXYCYCLINE HYCLATE 100 MG
TABLET ORAL
COMMUNITY
Start: 2021-01-02 | End: 2021-03-01

## 2021-02-19 RX ORDER — AMLODIPINE BESYLATE 5 MG/1
TABLET ORAL
Qty: 90 TABLET | Refills: 1 | Status: SHIPPED | OUTPATIENT
Start: 2021-02-19 | End: 2021-06-11

## 2021-02-19 RX ORDER — ISOSORBIDE MONONITRATE 60 MG/1
60 TABLET, EXTENDED RELEASE ORAL
COMMUNITY
Start: 2021-02-08 | End: 2021-04-06

## 2021-02-19 ASSESSMENT — PATIENT HEALTH QUESTIONNAIRE - PHQ9
SUM OF ALL RESPONSES TO PHQ QUESTIONS 1-9: 10
5. POOR APPETITE OR OVEREATING: NOT AT ALL

## 2021-02-19 ASSESSMENT — ANXIETY QUESTIONNAIRES
5. BEING SO RESTLESS THAT IT IS HARD TO SIT STILL: NOT AT ALL
3. WORRYING TOO MUCH ABOUT DIFFERENT THINGS: NOT AT ALL
7. FEELING AFRAID AS IF SOMETHING AWFUL MIGHT HAPPEN: NOT AT ALL
2. NOT BEING ABLE TO STOP OR CONTROL WORRYING: NOT AT ALL
1. FEELING NERVOUS, ANXIOUS, OR ON EDGE: NOT AT ALL
6. BECOMING EASILY ANNOYED OR IRRITABLE: NOT AT ALL
IF YOU CHECKED OFF ANY PROBLEMS ON THIS QUESTIONNAIRE, HOW DIFFICULT HAVE THESE PROBLEMS MADE IT FOR YOU TO DO YOUR WORK, TAKE CARE OF THINGS AT HOME, OR GET ALONG WITH OTHER PEOPLE: NOT DIFFICULT AT ALL
GAD7 TOTAL SCORE: 0

## 2021-02-19 NOTE — TELEPHONE ENCOUNTER
Routing refill request to provider for review/approval because:  Labs not current:  CBC, ALT  No current BP's      Abel Mills RN, BSN, PHN

## 2021-02-19 NOTE — PROGRESS NOTES
SUBJECTIVE:  Catherine Ferreira, a 75 year old female scheduled an appointment to discuss the following issues:  Follow-up htn, dm, CHF, copd, a.fib, high cholesterol, morbid obesity and depression/anxiety  Using oxygen at bedtime.   Lives alone senior high risk and checked on BID. Daughter invovled in cares regularly and lives one mile away.   Average blood sugar 170. No blood sugars <50 or LOSS OF CONSCIENCE. On metformin 3 pills/daily. Not watching diet with carbs. Diet pop.   Breathing stable except with cold weather. No prednisone in past 3months.   No feet changes except swelling. Eye exam appointment set-up.   Emotionally doing. Patient had covid - 2 months ago.   Water intake needs more. Scale at home - not checking regularly. Weight stable from rehab. No hematuria/dysuria.   [facility].   Lasix changed bumex.   Medical, social, surgical, and family histories reviewed.    ROS:  All other ROS negative  OBJECTIVE:  /79   Pulse 82   Temp 97.8  F (36.6  C) (Tympanic)   Wt 135.6 kg (299 lb)   SpO2 92%   BMI 52.97 kg/m     EXAM:  GENERAL APPEARANCE: healthy, alert and no distress  EYES: EOMI,  PERRL  HENT: ear canals and TM's normal and nose and mouth without ulcers or lesions  NECK: no adenopathy, no asymmetry, masses, or scars and thyroid normal to palpation  RESP: lungs clear to auscultation - no rales, rhonchi or wheezes  CV: regular rates and rhythm, normal S1 S2, no S3 or S4 and no murmur, click or rub -  ABDOMEN:  soft, nontender, no HSM or masses and bowel sounds normal  MS: extremities normal- no gross deformities noted, no evidence of inflammation in joints, FROM in all extremities.  NEURO: Normal strength and tone, sensory exam grossly normal, mentation intact and speech normal  PSYCH: mentation appears normal and affect normal/bright    ASSESSMENT / PLAN:  (I50.30) Heart failure with preserved ejection fraction, unspecified HF chronicity (H)  (primary encounter diagnosis)  Comment:  stalbe  Plan: bumetanide (BUMEX) 1 MG tablet        Monitor weight and to ER if rapid weight gain/ shortness of breath.     (I10) Essential hypertension with goal blood pressure less than 140/90  Comment: stable  Plan: bumetanide (BUMEX) 1 MG tablet, amLODIPine         (NORVASC) 5 MG tablet        Self-monitor/limit sodium.     (F33.1) Major depressive disorder, recurrent episode, moderate (H)  Comment: needs help  Plan: buPROPion (WELLBUTRIN SR) 200 MG 12 hr tablet        Will increase from 150 to 200mg. Reveiwed risks and side effects of medication  If SUICIAL IDEATION OR HOMOCIDAL IDEATION OR WAYNE TO ER     (F41.1) TEX (generalized anxiety disorder)  Comment: stable  Plan: consider lower paxil dosage.     (E11.9,  Z79.4) Type 2 diabetes mellitus without complication, with long-term current use of insulin (H)  Comment: needs help  Plan: metFORMIN (GLUCOPHAGE-XR) 500 MG 24 hr tablet        Will max mefformin but lower if cr a lot worse and add glipizide. Patient needs to work on diet/ weight loss. Return to clinic 6 weeks recheck labs.     David Dotson MD

## 2021-02-20 ASSESSMENT — ANXIETY QUESTIONNAIRES: GAD7 TOTAL SCORE: 0

## 2021-02-21 ENCOUNTER — MEDICAL CORRESPONDENCE (OUTPATIENT)
Dept: HEALTH INFORMATION MANAGEMENT | Facility: CLINIC | Age: 76
End: 2021-02-21
Payer: COMMERCIAL

## 2021-02-26 ENCOUNTER — TELEPHONE (OUTPATIENT)
Dept: FAMILY MEDICINE | Facility: CLINIC | Age: 76
End: 2021-02-26

## 2021-02-26 NOTE — TELEPHONE ENCOUNTER
Reason for Call:  Form, our goal is to have forms completed with 72 hours, however, some forms may require a visit or additional information.    Type of letter, form or note:  Home Health Certification    Who is the form from?: Home care    Where did the form come from: form was faxed in    What clinic location was the form placed at?: [facility]    Where the form was placed: Given to MA/RN    What number is listed as a contact on the form?: [facility] [phone]       Additional comments: I handed the [facility] to an [facility] RN for review    Call taken on 2/26/2021 at 4:54 PM by Gosia Lopez

## 2021-03-01 RX ORDER — IPRATROPIUM BROMIDE AND ALBUTEROL SULFATE 2.5; .5 MG/3ML; MG/3ML
1 SOLUTION RESPIRATORY (INHALATION) EVERY 4 HOURS PRN
COMMUNITY
Start: 2021-03-01

## 2021-03-01 RX ORDER — BUMETANIDE 1 MG/1
1.5 TABLET ORAL
COMMUNITY
Start: 2021-02-08 | End: 2021-05-04

## 2021-03-01 RX ORDER — LOPERAMIDE HYDROCHLORIDE 2 MG/1
TABLET ORAL
COMMUNITY
Start: 2021-03-01

## 2021-03-02 DIAGNOSIS — Z53.9 DIAGNOSIS NOT YET DEFINED: Primary | ICD-10-CM

## 2021-03-02 PROCEDURE — G0180 MD CERTIFICATION HHA PATIENT: HCPCS | Performed by: FAMILY MEDICINE

## 2021-03-02 NOTE — TELEPHONE ENCOUNTER
[facility] faxed to [place] [employer] [facility] [employer] [facility]. At [phone].  Faxed to stat scanning.   RAYSHAWN Lorenz

## 2021-03-12 ENCOUNTER — TRANSFERRED RECORDS (OUTPATIENT)
Dept: HEALTH INFORMATION MANAGEMENT | Facility: CLINIC | Age: 76
End: 2021-03-12

## 2021-03-26 ENCOUNTER — MEDICAL CORRESPONDENCE (OUTPATIENT)
Dept: HEALTH INFORMATION MANAGEMENT | Facility: CLINIC | Age: 76
End: 2021-03-26

## 2021-03-29 DIAGNOSIS — M79.7 FIBROMYALGIA: ICD-10-CM

## 2021-03-29 RX ORDER — GABAPENTIN 300 MG/1
CAPSULE ORAL
Qty: 90 CAPSULE | Refills: 0 | Status: SHIPPED | OUTPATIENT
Start: 2021-03-29 | End: 2021-07-22

## 2021-03-29 NOTE — TELEPHONE ENCOUNTER
Routing refill request to provider for review/approval because:  Drug not on the [facility] refill protocol     Mattie Elliott RN, [facility] Triage

## 2021-03-30 DIAGNOSIS — I50.9 CONGESTIVE HEART FAILURE, UNSPECIFIED HF CHRONICITY, UNSPECIFIED HEART FAILURE TYPE (H): ICD-10-CM

## 2021-03-30 NOTE — TELEPHONE ENCOUNTER
Routing refill request to provider for review/approval because:  Labs not current:  Platelet count, creatinine    Shelby PRINCEN, RN

## 2021-04-03 ENCOUNTER — TELEPHONE (OUTPATIENT)
Dept: FAMILY MEDICINE | Facility: CLINIC | Age: 76
End: 2021-04-03

## 2021-04-03 NOTE — TELEPHONE ENCOUNTER
Message: Please send a new rx. We unit dose for this pt and need this refill to complete her medication packs. Thanks.    Glipizide 5mg tab  Sig: Take 1 tablet by mouth every day before a meal  #: 30

## 2021-04-05 DIAGNOSIS — J45.40 MODERATE PERSISTENT ASTHMA WITHOUT COMPLICATION: ICD-10-CM

## 2021-04-05 DIAGNOSIS — E11.22 TYPE 2 DIABETES MELLITUS WITH STAGE 3 CHRONIC KIDNEY DISEASE, WITHOUT LONG-TERM CURRENT USE OF INSULIN, UNSPECIFIED WHETHER STAGE 3A OR 3B CKD (H): Primary | ICD-10-CM

## 2021-04-05 DIAGNOSIS — N18.30 TYPE 2 DIABETES MELLITUS WITH STAGE 3 CHRONIC KIDNEY DISEASE, WITHOUT LONG-TERM CURRENT USE OF INSULIN, UNSPECIFIED WHETHER STAGE 3A OR 3B CKD (H): Primary | ICD-10-CM

## 2021-04-05 NOTE — TELEPHONE ENCOUNTER
[facility] calling regarding Glipizide. Dr Mcpherson and Dr Dotson saying the other to prescribe. Wants to know what to do  [facility] [phone]  [facility]  2nd Floor  Primary Care

## 2021-04-05 NOTE — TELEPHONE ENCOUNTER
See note from gerontology appointment 3/1/21 with Grisel Mcrae NP.   I spoke with pharmacy and they will send request to her.  Fairda Silva BSN, RN

## 2021-04-05 NOTE — TELEPHONE ENCOUNTER
See note from nursing home visit 3/1/21.  Refill sent to that provider who declined filling it and wanted Dr. Dotson to fill.   Med no on med list.    Glipizide 5 mg tab  Take one by mouth every day before a meal.   Farida PRINCEN, RN

## 2021-04-06 ENCOUNTER — MEDICAL CORRESPONDENCE (OUTPATIENT)
Dept: HEALTH INFORMATION MANAGEMENT | Facility: CLINIC | Age: 76
End: 2021-04-06

## 2021-04-06 ENCOUNTER — TELEPHONE (OUTPATIENT)
Dept: FAMILY MEDICINE | Facility: CLINIC | Age: 76
End: 2021-04-06

## 2021-04-06 DIAGNOSIS — I10 ESSENTIAL HYPERTENSION WITH GOAL BLOOD PRESSURE LESS THAN 140/90: Primary | ICD-10-CM

## 2021-04-06 RX ORDER — GLIPIZIDE 5 MG/1
5 TABLET ORAL DAILY
Qty: 30 TABLET | Refills: 1 | Status: SHIPPED | OUTPATIENT
Start: 2021-04-06 | End: 2021-06-09

## 2021-04-06 RX ORDER — ISOSORBIDE MONONITRATE 60 MG/1
60 TABLET, EXTENDED RELEASE ORAL DAILY
Qty: 90 TABLET | Refills: 0 | Status: SHIPPED | OUTPATIENT
Start: 2021-04-06 | End: 2021-07-26

## 2021-04-06 NOTE — TELEPHONE ENCOUNTER
I'm ok with refill x 2month if already currently taking but would like a follow-up md appointment/labs in next 2months to recheck hgba1c. David Dotson MD

## 2021-04-06 NOTE — TELEPHONE ENCOUNTER
Please call patient to schedule the lab and MD appts as per MD note below.  Then johanna RN for refill needs.  Thank you.  Denisha iKng RN

## 2021-04-06 NOTE — TELEPHONE ENCOUNTER
Mail order pharmacy needing prescription for patient's isosorbide sent to pharmacy asap today.  They need to get the pill packs ready and mail them out to her today.  Was previously ordered when in TCU.  She is no longer there and needs to have her PCP order her medication for her.  Med pended for refill need.

## 2021-04-06 NOTE — TELEPHONE ENCOUNTER
Patient returned call and confirmed that this was sent to the correct pharmacy.  [facility]  2nd Floor  Primary Care

## 2021-04-06 NOTE — TELEPHONE ENCOUNTER
This is a new prescription. To provider for prescription to be sent to pharmacy    Mattie Moreno BSN, RN

## 2021-04-06 NOTE — TELEPHONE ENCOUNTER
Prescription done to [facility] specialty pharmacy. If that's not the right one ok to what ever pharm. Needed. David Dotson MD

## 2021-04-09 ENCOUNTER — MEDICAL CORRESPONDENCE (OUTPATIENT)
Dept: HEALTH INFORMATION MANAGEMENT | Facility: CLINIC | Age: 76
End: 2021-04-09
Payer: COMMERCIAL

## 2021-04-20 ENCOUNTER — MEDICAL CORRESPONDENCE (OUTPATIENT)
Dept: HEALTH INFORMATION MANAGEMENT | Facility: CLINIC | Age: 76
End: 2021-04-20

## 2021-04-28 DIAGNOSIS — E78.5 HYPERLIPIDEMIA WITH TARGET LDL LESS THAN 100: ICD-10-CM

## 2021-04-28 RX ORDER — ATORVASTATIN CALCIUM 80 MG/1
TABLET, FILM COATED ORAL
Qty: 90 TABLET | Refills: 1 | Status: SHIPPED | OUTPATIENT
Start: 2021-04-28 | End: 2022-01-03

## 2021-04-28 NOTE — TELEPHONE ENCOUNTER
Routing refill request to provider for review/approval because:  Labs not current:  LDL    Carmen Perez RN

## 2021-05-03 DIAGNOSIS — I50.30 HEART FAILURE WITH PRESERVED EJECTION FRACTION, UNSPECIFIED HF CHRONICITY (H): Primary | ICD-10-CM

## 2021-05-03 NOTE — LETTER
May 4, 2021    Catherine Ferreira  [address]   [place] [address]    Dear Catherine,       We recently received a refill request for bumetanide (BUMEX) 1 MG tablet.  We have refilled this for a one time 30 day supply only because you are due for a:    Medication office visit and lab appointment      Please schedule this lab appointment 4-5 days prior to the office visit.     Please call at your earliest convenience so that there will not be a delay with your future refills.          Thank you,   Your [facility] Team/sp  [phone]

## 2021-05-04 RX ORDER — BUMETANIDE 1 MG/1
TABLET ORAL
Qty: 45 TABLET | Refills: 0 | Status: SHIPPED | OUTPATIENT
Start: 2021-05-04 | End: 2021-06-09

## 2021-05-04 NOTE — TELEPHONE ENCOUNTER
Routing refill request to provider for review/approval because:  Labs out of range:    Creatinine   Date Value Ref Range Status   08/10/2020 1.45 (H) 0.52 - 1.04 mg/dL Final       Medication is reported/historical  Daniela Dewitt RN

## 2021-05-25 ENCOUNTER — MEDICAL CORRESPONDENCE (OUTPATIENT)
Dept: HEALTH INFORMATION MANAGEMENT | Facility: CLINIC | Age: 76
End: 2021-05-25

## 2021-06-09 DIAGNOSIS — I50.30 HEART FAILURE WITH PRESERVED EJECTION FRACTION, UNSPECIFIED HF CHRONICITY (H): ICD-10-CM

## 2021-06-09 DIAGNOSIS — N18.30 TYPE 2 DIABETES MELLITUS WITH STAGE 3 CHRONIC KIDNEY DISEASE, WITHOUT LONG-TERM CURRENT USE OF INSULIN, UNSPECIFIED WHETHER STAGE 3A OR 3B CKD (H): ICD-10-CM

## 2021-06-09 DIAGNOSIS — I10 ESSENTIAL HYPERTENSION WITH GOAL BLOOD PRESSURE LESS THAN 140/90: ICD-10-CM

## 2021-06-09 DIAGNOSIS — F41.1 GAD (GENERALIZED ANXIETY DISORDER): ICD-10-CM

## 2021-06-09 DIAGNOSIS — Z79.4 TYPE 2 DIABETES MELLITUS WITHOUT COMPLICATION, WITH LONG-TERM CURRENT USE OF INSULIN (H): ICD-10-CM

## 2021-06-09 DIAGNOSIS — G25.81 RLS (RESTLESS LEGS SYNDROME): ICD-10-CM

## 2021-06-09 DIAGNOSIS — E11.9 TYPE 2 DIABETES MELLITUS WITHOUT COMPLICATION, WITH LONG-TERM CURRENT USE OF INSULIN (H): ICD-10-CM

## 2021-06-09 DIAGNOSIS — E11.22 TYPE 2 DIABETES MELLITUS WITH STAGE 3 CHRONIC KIDNEY DISEASE, WITHOUT LONG-TERM CURRENT USE OF INSULIN, UNSPECIFIED WHETHER STAGE 3A OR 3B CKD (H): ICD-10-CM

## 2021-06-09 RX ORDER — LISINOPRIL 20 MG/1
TABLET ORAL
Qty: 180 TABLET | Refills: 0 | Status: SHIPPED | OUTPATIENT
Start: 2021-06-09 | End: 2021-07-06

## 2021-06-09 RX ORDER — ROPINIROLE 0.5 MG/1
TABLET, FILM COATED ORAL
Qty: 90 TABLET | Refills: 0 | Status: SHIPPED | OUTPATIENT
Start: 2021-06-09 | End: 2021-10-27

## 2021-06-09 RX ORDER — METFORMIN HCL 500 MG
TABLET, EXTENDED RELEASE 24 HR ORAL
Qty: 360 TABLET | Refills: 0 | Status: SHIPPED | OUTPATIENT
Start: 2021-06-09 | End: 2021-07-06

## 2021-06-09 RX ORDER — PAROXETINE 20 MG/1
TABLET, FILM COATED ORAL
Qty: 30 TABLET | Refills: 0 | Status: SHIPPED | OUTPATIENT
Start: 2021-06-09 | End: 2021-07-06

## 2021-06-09 RX ORDER — GLIPIZIDE 5 MG/1
TABLET ORAL
Qty: 30 TABLET | Refills: 1 | Status: SHIPPED | OUTPATIENT
Start: 2021-06-09 | End: 2021-09-23

## 2021-06-09 RX ORDER — BUMETANIDE 1 MG/1
TABLET ORAL
Qty: 45 TABLET | Refills: 0 | Status: SHIPPED | OUTPATIENT
Start: 2021-06-09

## 2021-06-09 NOTE — TELEPHONE ENCOUNTER
"Routing refill request to provider for review/approval because:  Failed protocol.  No future appointment scheduled. Please advise. Thank you. Monet Olguin R.N.  Requested Prescriptions   Pending Prescriptions Disp Refills    bumetanide (BUMEX) 1 MG tablet [Pharmacy Med Name: BUMETANIDE 1MG TABLETS] 45 tablet 0     Sig: TAKE 1 AND 1/2 TABLETS BY MOUTH EVERY MORNING       Diuretics (Including Combos) Protocol Failed - 6/9/2021  9:42 AM        Failed - Normal serum creatinine on file in past 12 months     Recent Labs   Lab Test 08/10/20  1126   CR 1.45*              Passed - Blood pressure under 140/90 in past 12 months     BP Readings from Last 3 Encounters:   02/19/21 137/79   02/12/20 136/72   12/09/19 116/50                 Passed - Recent (12 mo) or future (30 days) visit within the authorizing provider's specialty     Patient has had an office visit with the authorizing provider or a provider within the authorizing providers department within the previous 12 mos or has a future within next 30 days. See \"Patient Info\" tab in inbasket, or \"Choose Columns\" in Meds & Orders section of the refill encounter.              Passed - Medication is active on med list        Passed - Patient is age 18 or older        Passed - No active pregancy on record        Passed - Normal serum potassium on file in past 12 months     Recent Labs   Lab Test 08/10/20  1126   POTASSIUM 4.1                    Passed - Normal serum sodium on file in past 12 months     Recent Labs   Lab Test 08/10/20  1126                 Passed - No positive pregnancy test in past 12 months          glipiZIDE (GLUCOTROL) 5 MG tablet [Pharmacy Med Name: GLIPIZIDE 5MG TABLETS] 30 tablet 1     Sig: TAKE 1 TABLET(5 MG) BY MOUTH DAILY BEFORE A MEAL       Sulfonylurea Agents Failed - 6/9/2021  9:42 AM        Failed - Patient has documented A1c within the specified period of time.     If HgbA1C is 8 or greater, it needs to be on file within the past 3 months. " " If less than 8, must be on file within the past 6 months.     Recent Labs   Lab Test 08/10/20  1126   A1C 7.5*             Failed - Patient has a recent creatinine (normal) within the past 12 mos.     Recent Labs   Lab Test 08/10/20  1126 04/22/15  0000 04/22/15   CR 1.45*   < >  --    CRPOC  --   --  1.1    < > = values in this interval not displayed.       Ok to refill medication if creatinine is low          Passed - Medication is active on med list        Passed - Patient is age 18 or older        Passed - No active pregnancy on record        Passed - Patient has not had a positive pregnancy test within the past 12 mos.        Passed - Recent (6 mo) or future (30 days) visit within the authorizing provider's specialty     Patient had office visit in the last 6 months or has a visit in the next 30 days with authorizing provider or within the authorizing provider's specialty.  See \"Patient Info\" tab in inbasket, or \"Choose Columns\" in Meds & Orders section of the refill encounter.              metFORMIN (GLUCOPHAGE-XR) 500 MG 24 hr tablet [Pharmacy Med Name: METFORMIN ER 500MG 24HR TABS] 360 tablet 0     Sig: TAKE 2 TABLETS BY MOUTH TWICE DAILY WITH BREAKFAST AND WITH DINNER       Biguanide Agents Failed - 6/9/2021  9:42 AM        Failed - Patient has documented A1c within the specified period of time.     If HgbA1C is 8 or greater, it needs to be on file within the past 3 months.  If less than 8, must be on file within the past 6 months.     Recent Labs   Lab Test 08/10/20  1126   A1C 7.5*             Failed - Patient's CR is NOT>1.4 OR Patient's EGFR is NOT<45 within past 12 mos.     Recent Labs   Lab Test 08/10/20  1126 04/22/15  0000 04/22/15   GFR  --   --  49   GFRB  --   --  51   GFRESTIMATED 35*   < >  --    GFRESTBLACK 41*   < >  --     < > = values in this interval not displayed.       Recent Labs   Lab Test 08/10/20  1126 04/22/15  0000 04/22/15   CR 1.45*   < >  --    CRPOC  --   --  1.1    < > = " "values in this interval not displayed.             Failed - Medication is active on med list        Passed - Patient is age 10 or older        Passed - Patient does NOT have a diagnosis of CHF.        Passed - Patient is not pregnant        Passed - Patient has not had a positive pregnancy test within the past 12 mos.         Passed - Recent (6 mo) or future (30 days) visit within the authorizing provider's specialty     Patient had office visit in the last 6 months or has a visit in the next 30 days with authorizing provider or within the authorizing provider's specialty.  See \"Patient Info\" tab in inbasket, or \"Choose Columns\" in Meds & Orders section of the refill encounter.              lisinopril (ZESTRIL) 20 MG tablet [Pharmacy Med Name: LISINOPRIL 20MG TABLETS] 180 tablet 0     Sig: TAKE 1 TABLET BY MOUTH TWICE DAILY FOR BLOOD PRESSURE       ACE Inhibitors (Including Combos) Protocol Failed - 6/9/2021  9:42 AM        Failed - Medication is active on med list        Failed - Normal serum creatinine on file in past 12 months     Recent Labs   Lab Test 08/10/20  1126 04/22/15  0000 04/22/15   CR 1.45*   < >  --    CRPOC  --   --  1.1    < > = values in this interval not displayed.       Ok to refill medication if creatinine is low          Passed - Blood pressure under 140/90 in past 12 months     BP Readings from Last 3 Encounters:   02/19/21 137/79   02/12/20 136/72   12/09/19 116/50                 Passed - Recent (12 mo) or future (30 days) visit within the authorizing provider's specialty     Patient has had an office visit with the authorizing provider or a provider within the authorizing providers department within the previous 12 mos or has a future within next 30 days. See \"Patient Info\" tab in inbasket, or \"Choose Columns\" in Meds & Orders section of the refill encounter.              Passed - Patient is age 18 or older        Passed - No active pregnancy on record        Passed - Normal serum " "potassium on file in past 12 months     Recent Labs   Lab Test 08/10/20  1126   POTASSIUM 4.1             Passed - No positive pregnancy test within past 12 months          rOPINIRole (REQUIP) 0.5 MG tablet [Pharmacy Med Name: ROPINIROLE 0.5MG TABLETS] 90 tablet 0     Sig: TAKE 1 TABLET(0.5 MG) BY MOUTH DAILY WITH DINNER       Antiparkinson's Agents Protocol Failed - 6/9/2021  9:42 AM        Failed - CBC on record in past 12 months     Recent Labs   Lab Test 12/09/19  1238   WBC 8.8   RBC 4.64   HGB 12.6   HCT 41.6                    Failed - ALT on record in past 12 months         Recent Labs   Lab Test 12/09/19  1238   ALT 22             Passed - Blood pressure under 140/90 in past 12 months     BP Readings from Last 3 Encounters:   02/19/21 137/79   02/12/20 136/72   12/09/19 116/50                 Passed - Serum Creatinine on file in past 12 months     Recent Labs   Lab Test 08/10/20  1126 04/22/15  0000 04/22/15   CR 1.45*   < >  --    CRPOC  --   --  1.1    < > = values in this interval not displayed.       Ok to refill medication if creatinine is low          Passed - Medication is active on med list        Passed - Patient is age 18 or older        Passed - No active pregnancy on record        Passed - No positive pregnancy test in the past 12 months        Passed - Recent (6 mo) or future (30 days) visit within the authorizing provider's specialty     Patient had office visit in the last 6 months or has a visit in the next 30 days with authorizing provider or within the authorizing provider's specialty.  See \"Patient Info\" tab in inbasket, or \"Choose Columns\" in Meds & Orders section of the refill encounter.             Signed Prescriptions Disp Refills    PARoxetine (PAXIL) 20 MG tablet 30 tablet 0     Sig: TAKE 1 TABLET BY MOUTH AT BEDTIME FOR ANXIETY       SSRIs Protocol Passed - 6/9/2021  9:42 AM        Passed - Recent (12 mo) or future (30 days) visit within the authorizing provider's specialty " "    Patient has had an office visit with the authorizing provider or a provider within the authorizing providers department within the previous 12 mos or has a future within next 30 days. See \"Patient Info\" tab in inbasket, or \"Choose Columns\" in Meds & Orders section of the refill encounter.              Passed - Medication is active on med list        Passed - Patient is age 18 or older        Passed - No active pregnancy on record        Passed - No positive pregnancy test in last 12 months                 "

## 2021-06-09 NOTE — TELEPHONE ENCOUNTER
Refilled x 1 month per protocol.  Patient needs to be seen for further refills. Thank you. Monet Olguin R.N.

## 2021-06-09 NOTE — LETTER
June 9, 2021    Catherine Ferreira  [address]   [place] [facility] [place] [address]    Dear Catherine,       We recently received a refill request for lisinopril and metformin.  We have refilled this for a one time 90 day supply only because you are due for a:    Medication check office visit and fasting lab appointment-needed in the next month per Dr Dotson.      Please schedule this lab appointment 4-5 days prior to the office visit.     Please call at your earliest convenience so that there will not be a delay with your future refills.          Thank you,   Your [facility] Team/  [phone]

## 2021-06-10 DIAGNOSIS — I50.9 CONGESTIVE HEART FAILURE, UNSPECIFIED HF CHRONICITY, UNSPECIFIED HEART FAILURE TYPE (H): ICD-10-CM

## 2021-06-10 RX ORDER — APIXABAN 5 MG/1
TABLET, FILM COATED ORAL
Qty: 60 TABLET | Refills: 1 | Status: SHIPPED | OUTPATIENT
Start: 2021-06-10 | End: 2021-09-23

## 2021-06-10 NOTE — TELEPHONE ENCOUNTER
Routing refill request to provider for review/approval because:  Labs out of range:  platelets  Labs not current:  Fredy Silva BSN, RN

## 2021-06-11 DIAGNOSIS — I10 ESSENTIAL HYPERTENSION WITH GOAL BLOOD PRESSURE LESS THAN 140/90: ICD-10-CM

## 2021-06-11 RX ORDER — AMLODIPINE BESYLATE 5 MG/1
TABLET ORAL
Qty: 90 TABLET | Refills: 1 | Status: SHIPPED | OUTPATIENT
Start: 2021-06-11 | End: 2022-01-28

## 2021-06-11 NOTE — TELEPHONE ENCOUNTER
Routing refill request to provider for review/approval because:  Labs out of range:  Cr  Farida Silva BSN, RN

## 2021-06-29 DIAGNOSIS — I50.30 HEART FAILURE WITH PRESERVED EJECTION FRACTION, UNSPECIFIED HF CHRONICITY (H): Primary | ICD-10-CM

## 2021-06-29 RX ORDER — BUMETANIDE 2 MG/1
TABLET ORAL
Qty: 60 TABLET | Refills: 1 | Status: SHIPPED | OUTPATIENT
Start: 2021-06-29 | End: 2021-09-28

## 2021-06-30 ENCOUNTER — DOCUMENTATION ONLY (OUTPATIENT)
Dept: LAB | Facility: CLINIC | Age: 76
End: 2021-06-30

## 2021-06-30 DIAGNOSIS — N18.30 TYPE 2 DIABETES MELLITUS WITH STAGE 3 CHRONIC KIDNEY DISEASE, WITHOUT LONG-TERM CURRENT USE OF INSULIN, UNSPECIFIED WHETHER STAGE 3A OR 3B CKD (H): ICD-10-CM

## 2021-06-30 DIAGNOSIS — I10 ESSENTIAL HYPERTENSION WITH GOAL BLOOD PRESSURE LESS THAN 140/90: Primary | ICD-10-CM

## 2021-06-30 DIAGNOSIS — D69.9 TENDENCY TOWARD BLEEDING EASILY (H): ICD-10-CM

## 2021-06-30 DIAGNOSIS — E11.22 TYPE 2 DIABETES MELLITUS WITH STAGE 3 CHRONIC KIDNEY DISEASE, WITHOUT LONG-TERM CURRENT USE OF INSULIN, UNSPECIFIED WHETHER STAGE 3A OR 3B CKD (H): ICD-10-CM

## 2021-06-30 NOTE — PROGRESS NOTES
.Catherine Ferreira has an upcoming lab appointment:    Future Appointments   Date Time Provider Department [place]   7/3/2021  5:00 PM [facility]   7/6/2021 11:15 AM David Dotson [facility]     Patient is scheduled for the following lab(s): ***    Patient either has no future order, or has Health Maintenance labs due. Please review and place either future orders or HMPO (Review of Health Maintenance Protocol Orders), as appropriate.    Health Maintenance Due   Topic     ANNUAL REVIEW OF HM ORDERS      HEPATITIS C SCREENING      MICROALBUMIN      A1C      ALT      LIPID      CBC      BMP      Tiffanie Germain

## 2021-07-05 DIAGNOSIS — E11.22 TYPE 2 DIABETES MELLITUS WITH STAGE 3 CHRONIC KIDNEY DISEASE, WITHOUT LONG-TERM CURRENT USE OF INSULIN, UNSPECIFIED WHETHER STAGE 3A OR 3B CKD (H): ICD-10-CM

## 2021-07-05 DIAGNOSIS — D69.9 TENDENCY TOWARD BLEEDING EASILY (H): ICD-10-CM

## 2021-07-05 DIAGNOSIS — N18.30 TYPE 2 DIABETES MELLITUS WITH STAGE 3 CHRONIC KIDNEY DISEASE, WITHOUT LONG-TERM CURRENT USE OF INSULIN, UNSPECIFIED WHETHER STAGE 3A OR 3B CKD (H): ICD-10-CM

## 2021-07-05 DIAGNOSIS — I10 ESSENTIAL HYPERTENSION WITH GOAL BLOOD PRESSURE LESS THAN 140/90: ICD-10-CM

## 2021-07-05 LAB
ERYTHROCYTE [DISTWIDTH] IN BLOOD BY AUTOMATED COUNT: 18.9 % (ref 10–15)
HBA1C MFR BLD: 6.3 % (ref 0–5.6)
HCT VFR BLD AUTO: 37.2 % (ref 35–47)
HGB BLD-MCNC: 10.9 G/DL (ref 11.7–15.7)
MCH RBC QN AUTO: 25.6 PG (ref 26.5–33)
MCHC RBC AUTO-ENTMCNC: 29.3 G/DL (ref 31.5–36.5)
MCV RBC AUTO: 87 FL (ref 78–100)
PLATELET # BLD AUTO: 206 10E9/L (ref 150–450)
RBC # BLD AUTO: 4.26 10E12/L (ref 3.8–5.2)
WBC # BLD AUTO: 9.4 10E9/L (ref 4–11)

## 2021-07-05 PROCEDURE — 80069 RENAL FUNCTION PANEL: CPT | Performed by: FAMILY MEDICINE

## 2021-07-05 PROCEDURE — 36415 COLL VENOUS BLD VENIPUNCTURE: CPT | Performed by: FAMILY MEDICINE

## 2021-07-05 PROCEDURE — 83036 HEMOGLOBIN GLYCOSYLATED A1C: CPT | Performed by: FAMILY MEDICINE

## 2021-07-05 PROCEDURE — 85027 COMPLETE CBC AUTOMATED: CPT | Performed by: FAMILY MEDICINE

## 2021-07-06 ENCOUNTER — TELEPHONE (OUTPATIENT)
Dept: FAMILY MEDICINE | Facility: CLINIC | Age: 76
End: 2021-07-06

## 2021-07-06 ENCOUNTER — OFFICE VISIT (OUTPATIENT)
Dept: FAMILY MEDICINE | Facility: CLINIC | Age: 76
End: 2021-07-06
Payer: COMMERCIAL

## 2021-07-06 VITALS
OXYGEN SATURATION: 93 % | TEMPERATURE: 98 F | SYSTOLIC BLOOD PRESSURE: 104 MMHG | HEART RATE: 87 BPM | DIASTOLIC BLOOD PRESSURE: 47 MMHG | WEIGHT: 290 LBS | BODY MASS INDEX: 51.38 KG/M2 | HEIGHT: 63 IN

## 2021-07-06 DIAGNOSIS — F33.1 MAJOR DEPRESSIVE DISORDER, RECURRENT EPISODE, MODERATE (H): ICD-10-CM

## 2021-07-06 DIAGNOSIS — N18.30 TYPE 2 DIABETES MELLITUS WITH STAGE 3 CHRONIC KIDNEY DISEASE, WITHOUT LONG-TERM CURRENT USE OF INSULIN, UNSPECIFIED WHETHER STAGE 3A OR 3B CKD (H): ICD-10-CM

## 2021-07-06 DIAGNOSIS — F41.1 GAD (GENERALIZED ANXIETY DISORDER): ICD-10-CM

## 2021-07-06 DIAGNOSIS — E11.9 TYPE 2 DIABETES MELLITUS WITHOUT COMPLICATION, WITH LONG-TERM CURRENT USE OF INSULIN (H): ICD-10-CM

## 2021-07-06 DIAGNOSIS — Z79.4 TYPE 2 DIABETES MELLITUS WITHOUT COMPLICATION, WITH LONG-TERM CURRENT USE OF INSULIN (H): ICD-10-CM

## 2021-07-06 DIAGNOSIS — I50.30 HEART FAILURE WITH PRESERVED EJECTION FRACTION, UNSPECIFIED HF CHRONICITY (H): ICD-10-CM

## 2021-07-06 DIAGNOSIS — K21.9 GASTROESOPHAGEAL REFLUX DISEASE WITHOUT ESOPHAGITIS: ICD-10-CM

## 2021-07-06 DIAGNOSIS — E11.22 TYPE 2 DIABETES MELLITUS WITH STAGE 3 CHRONIC KIDNEY DISEASE, WITHOUT LONG-TERM CURRENT USE OF INSULIN, UNSPECIFIED WHETHER STAGE 3A OR 3B CKD (H): ICD-10-CM

## 2021-07-06 DIAGNOSIS — B37.2 YEAST DERMATITIS: Primary | ICD-10-CM

## 2021-07-06 DIAGNOSIS — I10 ESSENTIAL HYPERTENSION WITH GOAL BLOOD PRESSURE LESS THAN 140/90: ICD-10-CM

## 2021-07-06 LAB
ALBUMIN SERPL-MCNC: 3.3 G/DL (ref 3.4–5)
ANION GAP SERPL CALCULATED.3IONS-SCNC: 8 MMOL/L (ref 3–14)
BUN SERPL-MCNC: 96 MG/DL (ref 7–30)
CALCIUM SERPL-MCNC: 8.9 MG/DL (ref 8.5–10.1)
CHLORIDE SERPL-SCNC: 111 MMOL/L (ref 94–109)
CO2 SERPL-SCNC: 24 MMOL/L (ref 20–32)
CREAT SERPL-MCNC: 3.73 MG/DL (ref 0.52–1.04)
GFR SERPL CREATININE-BSD FRML MDRD: 11 ML/MIN/{1.73_M2}
GLUCOSE SERPL-MCNC: 47 MG/DL (ref 70–99)
PHOSPHATE SERPL-MCNC: 3.7 MG/DL (ref 2.5–4.5)
POTASSIUM SERPL-SCNC: 5.1 MMOL/L (ref 3.4–5.3)
SODIUM SERPL-SCNC: 143 MMOL/L (ref 133–144)

## 2021-07-06 PROCEDURE — 99214 OFFICE O/P EST MOD 30 MIN: CPT | Performed by: FAMILY MEDICINE

## 2021-07-06 RX ORDER — PANTOPRAZOLE SODIUM 40 MG/1
40 TABLET, DELAYED RELEASE ORAL DAILY
Qty: 90 TABLET | Refills: 1 | Status: SHIPPED | OUTPATIENT
Start: 2021-07-06 | End: 2021-11-02

## 2021-07-06 RX ORDER — METFORMIN HCL 500 MG
TABLET, EXTENDED RELEASE 24 HR ORAL
Qty: 1 TABLET | Refills: 0 | Status: SHIPPED | OUTPATIENT
Start: 2021-07-06 | End: 2021-08-24

## 2021-07-06 RX ORDER — PAROXETINE 20 MG/1
TABLET, FILM COATED ORAL
Qty: 90 TABLET | Refills: 1 | Status: SHIPPED | OUTPATIENT
Start: 2021-07-06 | End: 2021-07-22

## 2021-07-06 RX ORDER — NYSTATIN 100000 [USP'U]/G
POWDER TOPICAL 3 TIMES DAILY
Qty: 60 G | Refills: 1 | Status: SHIPPED | OUTPATIENT
Start: 2021-07-06 | End: 2021-07-06

## 2021-07-06 RX ORDER — PANTOPRAZOLE SODIUM 40 MG/1
40 TABLET, DELAYED RELEASE ORAL DAILY
Qty: 90 TABLET | Refills: 1 | Status: SHIPPED | OUTPATIENT
Start: 2021-07-06 | End: 2021-07-06

## 2021-07-06 RX ORDER — PAROXETINE 20 MG/1
TABLET, FILM COATED ORAL
Qty: 90 TABLET | Refills: 1 | Status: SHIPPED | OUTPATIENT
Start: 2021-07-06 | End: 2021-07-06

## 2021-07-06 RX ORDER — LISINOPRIL 20 MG/1
TABLET ORAL
Qty: 1 TABLET | Refills: 0 | Status: SHIPPED | OUTPATIENT
Start: 2021-07-06 | End: 2021-08-24

## 2021-07-06 RX ORDER — NYSTATIN 100000 [USP'U]/G
POWDER TOPICAL 3 TIMES DAILY
Qty: 60 G | Refills: 1 | Status: SHIPPED | OUTPATIENT
Start: 2021-07-06

## 2021-07-06 ASSESSMENT — MIFFLIN-ST. JEOR: SCORE: 1774.56

## 2021-07-06 NOTE — TELEPHONE ENCOUNTER
Left a message to return a call to [phone].  Monet Olguin R.N.      Staff:  Whomever speaks with Catherine please relay the message from Dr. Dotson verbatim and document appropriately.  If further support is needed please take down what is needed and route the message back to Dr. Dotson  Thank you. Monet Olguin R.N.    Below is from the reference section of the patient's chart:      Low Blood Sugar (Hypoglycemia)      What you may notice  If you have low blood sugar, you may have one or more of these symptoms:    Shakiness or dizziness    Cold, clammy skin or sweating    Feeling hungry    Headache    Nervousness    A hard, fast heartbeat    Weakness    Confusion or irritability    Blurred eyesight    Having nightmares or waking up confused or sweating  Numbness or tingling in the lips or tongue    What you should do  Here are tips to follow if you have hypoglycemia:     First check your blood sugar. If it's too low (out of your target range), eat or drink 15 to 20 grams of fast-acting sugar. This may be 3 to 4 glucose tablets, 4 ounces (half a cup) of fruit juice or regular (nondiet) soda, or 1 tablespoon of honey. Don t take more than this, or your blood sugar may go too high.    Don't eat foods high in protein such as milk or nuts to treat hypoglycemia. Protein may increase your insulin response. It may lower your blood sugar even more.    Wait 15 minutes. Then recheck your blood sugar if you can.    If your blood sugar is still too low, repeat the steps above and check your blood sugar again. If your blood sugar still has not gone back to your target range, contact your healthcare provider. Or seek emergency care.    Once your blood sugar is back at target range, eat a snack or meal.        6064-4213 [employer]. All rights reserved. This information is not intended as a substitute for professional medical care. Always follow your healthcare professional's instructions.

## 2021-07-06 NOTE — TELEPHONE ENCOUNTER
Patient to monitor sugar and lower metformin (1/2 dosage)  to help raise sugar and lower kidney tests as discussed. Will need repeat non-fasting labs in 2 months. Keep sugar - candy/pop/etc avaiable if too low or LIGHTHEADED to ER. David Dotson MD

## 2021-07-06 NOTE — TELEPHONE ENCOUNTER
Provider:  Please place future lab orders for this patient.  Her blood sugars now are 224.  Thank you. Monet Olguin R.N.    She checks her blood sugars daily, but didn't today as she had to run out the door to an appointment.  Her current blood sugar, at 5:20 pm, is 224.  They are usually in the 140s and she had a big lunch at about noon. She feels fine at this time.      Nursing action:  She was given the message from Dr. Dotson and the information below from the reference section of her chart. She declines support in making a lab appointment at this time as her daughter has to bring her.  She will call back to schedule this. She was informed she always has access to a nurse by calling [phone].    Patient verbalizes good understanding, agrees with plan and states she needs no further support. Monet Olguin R.N.

## 2021-07-06 NOTE — PROGRESS NOTES
"SUBJECTIVE:  Catherine Ferreira, a 75 year old female scheduled an appointment to discuss the following issues:  Follow-up htn, dm, CHF, copd, a.fib, high cholesterol, morbid obesity and depression/anxiety  Using oxygen at bedtime.   Glipizide added and metformin maxed out in spring.   Was in hospital for acute on chronic CHF. Seen nephrology  Blood sugars running ok. Not <50 or >300.   No insulin. Water intake ok. Daily weights. Breathing bad with humid weather. History covid and shot.  No nausea, vomiting or diarrhea or black/bloody stools. No urine changes or hematuria.   No nephrology follow-up   Off oxygen.   Breast rash - history yeast - nystatin helpful in past.   Medical, social, surgical, and family histories reviewed.    ROS:  All other ROS negative   OBJECTIVE:  /47   Pulse 87   Temp 98  F (36.7  C) (Tympanic)   Ht 1.6 m (5' 3\")   Wt 131.5 kg (290 lb)   SpO2 93%   BMI 51.37 kg/m    EXAM:  GENERAL APPEARANCE: healthy, alert and no distress  EYES: EOMI,  PERRL  HENT: ear canals and TM's normal and nose and mouth without ulcers or lesions  NECK: no adenopathy, no asymmetry, masses, or scars and thyroid normal to palpation  RESP: mild diffuse exp wheezing. Good overall airflow  CV: regular rates and rhythm, normal S1 S2, no S3 or S4 and no murmur, click or rub -  ABDOMEN:  soft, nontender, no HSM or masses and bowel sounds normal  MS: extremities normal- no gross deformities noted, no evidence of inflammation in joints, FROM in all extremities.  SKIN: red/moist rash under left breast  NEURO: Normal strength and tone, sensory exam grossly normal, mentation intact and speech normal  PSYCH: mentation appears normal and affect normal/bright    ASSESSMENT / PLAN:  (B37.2) Yeast dermatitis  (primary encounter diagnosis)  Plan: nystatin (MYCOSTATIN) 305492 UNIT/GM external         powder        Keep area dry    (I10) Essential hypertension with goal blood pressure less than 140/90  Comment: over treatment "   Plan: lisinopril (ZESTRIL) 20 MG tablet        Will 1/2 lisinorpil from BID to once in AM.       (E11.9,  Z79.4) Type 2 diabetes mellitus without complication, with long-term current use of insulin (H)  Comment: overtreatment  Plan: metFORMIN (GLUCOPHAGE-XR) 500 MG 24 hr tablet        Will 1/2 metformin dosage. Continue diet/ weight loss. Recheck in 4 months      (F41.1) TEX (generalized anxiety disorder)  Comment: stable  Plan: PARoxetine (PAXIL) 20 MG tablet        If SUICIAL IDEATION OR HOMOCIDAL IDEATION OR WAYNE TO ER     (K21.9) Gastroesophageal reflux disease without esophagitis  Comment: stable  Plan: pantoprazole (PROTONIX) 40 MG EC tablet        egd if worse    (I50.30) Heart failure with preserved ejection fraction, unspecified HF chronicity (H)  Comment: stable  Plan: continue bumex and can go down to ONCE daily if weight and swelling and blood pressure stable. To er if rapid weight gain. Limit sodium    (E11.22,  N18.30) Type 2 diabetes mellitus with stage 3 chronic kidney disease, without long-term current use of insulin, unspecified whether stage 3a or 3b CKD (H)  Plan: back to nephrology if cr a lot worse. Lowering metformin/lisinopril should help cr.     (F33.1) Major depressive disorder, recurrent episode, moderate (H)  Comment: stable  Plan: continue paxil.       David Dotson MD

## 2021-07-06 NOTE — TELEPHONE ENCOUNTER
Lab calling with critical lab value: Glucose is 47 this was a non fasting specimen.  Drawn yesterday 7/5/2021 at 5:14 pm.  Patient was seen today.  Routing to the provider to address.  May have been addressed at the visit today.  Thank you. Monet Olguin R.N.

## 2021-07-07 ENCOUNTER — TELEPHONE (OUTPATIENT)
Dept: FAMILY MEDICINE | Facility: CLINIC | Age: 76
End: 2021-07-07

## 2021-07-07 NOTE — TELEPHONE ENCOUNTER
Left message on answering machine for patient to call back to [phone].    RN please give patient provider message as written.  Farida PRINCEN, RN

## 2021-07-07 NOTE — TELEPHONE ENCOUNTER
David Dotson MD  [facility]             Please call patient. Kidney tests high. 1/2 metformin and 1/2 lisinopril should help., repeat in 6 weeks with NON-fasting labs and virtual visit. Please help set-up.     Patient:   Catherine Ferreira   [phone] (home) none (work)     Provider:   David Dotson MD MD   Please call/evisit with questions or concerns.

## 2021-07-07 NOTE — TELEPHONE ENCOUNTER
Patient returned call to clinic, sent to RN line.  Spoke with patient, relayed results as noted below.  Patient verbalized understanding.  Patient noted she was actually going to call the clinic this morning anyway, if having some UTI or bladder infection symptoms and has been awake most of the night.    Started in middle of night, having burning with urination, frequent urination and incontinence. Wearing Adult diaper, changing very frequently. Can't control urine.   Denies any blood, no flank or low back pain. Denies fever, nausea, vomiting, malodorous urine.  Has pressure that builds in bladder.    Was in office yesterday to see PCP and did not have these symptoms at time of visit. Started suddenly last night.  Patient notes she just woke up, did not sleep well at all last night, was up most of the night going to the bathroom frequently.    Has not checked her BG level as of yet, has not had any breakfast or any morning medications. Does check BP at home, has not done this as of yet either. Patient had a very low glucose level at time of visit yesterday. Team did call patient later in day and had recheck BG and had come up to normal value (see TE in Chart Review from yesterday). BP reading from yesterday, diastolic reading was low in office as well (104/47).    Writer advised for patient to RTC today for evaluation of her urinary symptoms. No openings with primary care. Urgent Care was recommended. Patient agreed with plan, she will call daughter and ask for help with a ride. Will walk in to  today at [facility]. Hours of operation given. Patient will also check her BG level before coming in to office.      Yajaira Durham RN  [facility]

## 2021-07-20 ENCOUNTER — TELEPHONE (OUTPATIENT)
Dept: FAMILY MEDICINE | Facility: CLINIC | Age: 76
End: 2021-07-20

## 2021-07-20 NOTE — TELEPHONE ENCOUNTER
LM informing Denisha of provider message. Gave direct call back number for questions. Migdalia SR -

## 2021-07-20 NOTE — TELEPHONE ENCOUNTER
Patient would like to start home care to [facility] until she can find an assisted living facility. Migdalia SR -

## 2021-07-21 ENCOUNTER — NURSE TRIAGE (OUTPATIENT)
Dept: NURSING | Facility: CLINIC | Age: 76
End: 2021-07-21
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
## 2021-07-22 DIAGNOSIS — F41.1 GAD (GENERALIZED ANXIETY DISORDER): ICD-10-CM

## 2021-07-22 DIAGNOSIS — I10 ESSENTIAL HYPERTENSION WITH GOAL BLOOD PRESSURE LESS THAN 140/90: ICD-10-CM

## 2021-07-22 DIAGNOSIS — M79.7 FIBROMYALGIA: ICD-10-CM

## 2021-07-22 RX ORDER — GABAPENTIN 300 MG/1
CAPSULE ORAL
Qty: 90 CAPSULE | Refills: 0 | Status: SHIPPED | OUTPATIENT
Start: 2021-07-22 | End: 2021-08-24

## 2021-07-22 RX ORDER — METOPROLOL SUCCINATE 50 MG/1
TABLET, EXTENDED RELEASE ORAL
Qty: 90 TABLET | Refills: 0 | Status: SHIPPED | OUTPATIENT
Start: 2021-07-22 | End: 2021-11-25

## 2021-07-22 RX ORDER — PAROXETINE 20 MG/1
TABLET, FILM COATED ORAL
Qty: 90 TABLET | Refills: 1 | Status: SHIPPED | OUTPATIENT
Start: 2021-07-22 | End: 2022-03-09

## 2021-07-22 NOTE — TELEPHONE ENCOUNTER
"Routing refill request to provider for review/approval because:  Drug not on the [facility] refill protocol and the patient was seen within the last 30 days.  Please review the medication being requested and refill if you find appropriate.  Thank you. Monet Olguin R.N.    Requested Prescriptions   Pending Prescriptions Disp Refills    metoprolol succinate ER (TOPROL-XL) 50 MG 24 hr tablet [Pharmacy Med Name: METOPROLOL ER SUCCINATE 50MG TABS] 90 tablet 0     Sig: TAKE 1 TABLET BY MOUTH EVERY DAY        Beta-Blockers Protocol Passed - 7/22/2021  2:56 PM        Passed - Blood pressure under 140/90 in past 12 months       BP Readings from Last 3 Encounters:   07/06/21 104/47   02/19/21 137/79   02/12/20 136/72                 Passed - Patient is age 6 or older        Passed - Recent (12 mo) or future (30 days) visit within the authorizing provider's specialty     Patient has had an office visit with the authorizing provider or a provider within the authorizing providers department within the previous 12 mos or has a future within next 30 days. See \"Patient Info\" tab in inbasket, or \"Choose Columns\" in Meds & Orders section of the refill encounter.              Passed - Medication is active on med list           gabapentin (NEURONTIN) 300 MG capsule [Pharmacy Med Name: GABAPENTIN 300MG CAPSULES] 90 capsule 0     Sig: TAKE 1 CAPSULE BY MOUTH AT BEDTIME AS NEEDED        There is no refill protocol information for this order       Signed Prescriptions Disp Refills    PARoxetine (PAXIL) 20 MG tablet 90 tablet 1     Sig: TAKE 1 TABLET BY MOUTH AT BEDTIME FOR ANXIETY        SSRIs Protocol Passed - 7/22/2021  2:56 PM        Passed - Recent (12 mo) or future (30 days) visit within the authorizing provider's specialty     Patient has had an office visit with the authorizing provider or a provider within the authorizing providers department within the previous 12 mos or has a future within next 30 days. See \"Patient Info\" tab in " "inbasket, or \"Choose Columns\" in Meds & Orders section of the refill encounter.              Passed - Medication is active on med list        Passed - Patient is age 18 or older        Passed - No active pregnancy on record        Passed - No positive pregnancy test in last 12 months                "

## 2021-07-26 DIAGNOSIS — I10 ESSENTIAL HYPERTENSION WITH GOAL BLOOD PRESSURE LESS THAN 140/90: ICD-10-CM

## 2021-07-26 RX ORDER — ISOSORBIDE MONONITRATE 60 MG/1
TABLET, EXTENDED RELEASE ORAL
Qty: 90 TABLET | Refills: 1 | Status: SHIPPED | OUTPATIENT
Start: 2021-07-26 | End: 2022-03-08

## 2021-08-04 ENCOUNTER — TELEPHONE (OUTPATIENT)
Dept: FAMILY MEDICINE | Facility: CLINIC | Age: 76
End: 2021-08-04

## 2021-08-04 NOTE — TELEPHONE ENCOUNTER
"Denisha calling requesting the verbal orders given by Dr. Dotson on 7/20/2021 now need to be a written order stating, \"Patient needing Home Care Nursing Services.\" Please fax to Lisa Fragan: [facility] # [phone].    Willian Rockwell    "

## 2021-08-04 NOTE — LETTER
[facility],    Patient is needing Home Care Nursing Services.      David Dotson MD

## 2021-08-05 ENCOUNTER — MEDICAL CORRESPONDENCE (OUTPATIENT)
Dept: HEALTH INFORMATION MANAGEMENT | Facility: CLINIC | Age: 76
End: 2021-08-05

## 2021-08-05 NOTE — TELEPHONE ENCOUNTER
Orders letter faxed to Lisa  Attn: [facility] # [phone].    Willian Rockwell

## 2021-08-05 NOTE — TELEPHONE ENCOUNTER
To Provider,  Please see the message taken. I have pended a letter in Epic. If in agreeance, please sign and route back to the TC team to fax.   Thank you,  Willian Rockwell

## 2021-08-05 NOTE — TELEPHONE ENCOUNTER
Hello,   You had already gave verbal orders back on 7/20/2021. They want the written one now. I had pended the letter. Do you want to sign it or?  Thank you,  Willian Rockwell

## 2021-08-06 ENCOUNTER — TELEPHONE (OUTPATIENT)
Dept: FAMILY MEDICINE | Facility: CLINIC | Age: 76
End: 2021-08-06

## 2021-08-06 NOTE — TELEPHONE ENCOUNTER
Left a message to return a call to [phone].  Monet Olguin R.N.    Staff:  Whomever speaks with Kathryn please give delay of care orders as written by Dr. Dotson.  was not identified as secure. Thank you. Monet Olguin R.N.

## 2021-08-06 NOTE — TELEPHONE ENCOUNTER
Stella with [facility] calling back to get Dx for HC.   Reviewed the Diagnoses below with Stella.  She verbalized understanding and had no further questions    Shila Fry RN  [facility]

## 2021-08-06 NOTE — TELEPHONE ENCOUNTER
Stella with [facility] calling back to get Dx for HC- this was addressed in a separate phone encounter.  Also updated her on verbal approval for delay in HC, to start on 8/9/2021  She stated that another nurse already gave her those orders and this has been addressed.    Shila Fry RN  [facility]

## 2021-08-06 NOTE — TELEPHONE ENCOUNTER
[facility] Nurse, reports Patient is still enrolled Palliative care, however just received an order for Skilled Nursing.    1.  Need diagnosis.   2.  Is this in addition to Palliative Care.    Please advise  Daniela Dewitt RN

## 2021-08-06 NOTE — TELEPHONE ENCOUNTER
Left message on answering machine for nurse to call back to call back to [phone].    See 7/20/21 phone message. Provider gave verbal order to start home care to bridge until she can find an assisted living facility.     Pt has copd, CHF, DM, hypertension, morbid obesity, depression/anxiety. And is using oxygen. From ov on 7/6/21.    See other message also if nurse calls back.  Farida PRINCEN, RN

## 2021-08-06 NOTE — TELEPHONE ENCOUNTER
Home care calling.  They would like to have order for delay in start of care to 8/9/21.  Please call okay to nurses at [phone].    Farida PRINCEN, RN

## 2021-08-21 ENCOUNTER — MEDICAL CORRESPONDENCE (OUTPATIENT)
Dept: HEALTH INFORMATION MANAGEMENT | Facility: CLINIC | Age: 76
End: 2021-08-21

## 2021-08-23 ENCOUNTER — TRANSFERRED RECORDS (OUTPATIENT)
Dept: HEALTH INFORMATION MANAGEMENT | Facility: CLINIC | Age: 76
End: 2021-08-23

## 2021-08-24 ENCOUNTER — TELEPHONE (OUTPATIENT)
Dept: FAMILY MEDICINE | Facility: CLINIC | Age: 76
End: 2021-08-24

## 2021-08-24 NOTE — TELEPHONE ENCOUNTER
Returned call to Orly and left voicemail approving requested orders.  This RN remove the 3 mentioned medications.    Shelby Torres BSN, RN

## 2021-08-24 NOTE — TELEPHONE ENCOUNTER
Home Care is calling for orders.    PT 2 visits in the next 6 days    2 visit a week for 3 weeks    1 visit a week for 3 weeks    Nursing visit in the next 3 days    OT 1 visit in the next 10 days    Home Health aid-1 visit a week for 8 weeks    FYI-Patient's oxygen is not working. She is not using oxygen or CPAP at nighttime.    FYI-last hospital stay, these medications were discontinued-    Lisinopril  Metformin  Gabapentin    Patient needs a hospital F/U with DR Dotson.    Tamia Butt MA/TC

## 2021-08-26 ENCOUNTER — MEDICAL CORRESPONDENCE (OUTPATIENT)
Dept: HEALTH INFORMATION MANAGEMENT | Facility: CLINIC | Age: 76
End: 2021-08-26

## 2021-08-26 ENCOUNTER — TELEPHONE (OUTPATIENT)
Dept: FAMILY MEDICINE | Facility: CLINIC | Age: 76
End: 2021-08-26

## 2021-08-27 NOTE — TELEPHONE ENCOUNTER
Miles RN notified of provider message as written.  Miles RN verbalized understanding.  Farida Silva BSN, RN

## 2021-08-30 ENCOUNTER — MEDICAL CORRESPONDENCE (OUTPATIENT)
Dept: HEALTH INFORMATION MANAGEMENT | Facility: CLINIC | Age: 76
End: 2021-08-30

## 2021-08-31 ENCOUNTER — MEDICAL CORRESPONDENCE (OUTPATIENT)
Dept: HEALTH INFORMATION MANAGEMENT | Facility: CLINIC | Age: 76
End: 2021-08-31

## 2021-09-03 ENCOUNTER — TRANSFERRED RECORDS (OUTPATIENT)
Dept: HEALTH INFORMATION MANAGEMENT | Facility: CLINIC | Age: 76
End: 2021-09-03

## 2021-09-04 ENCOUNTER — MEDICAL CORRESPONDENCE (OUTPATIENT)
Dept: HEALTH INFORMATION MANAGEMENT | Facility: CLINIC | Age: 76
End: 2021-09-04

## 2021-09-05 ENCOUNTER — MEDICAL CORRESPONDENCE (OUTPATIENT)
Dept: HEALTH INFORMATION MANAGEMENT | Facility: CLINIC | Age: 76
End: 2021-09-05

## 2021-09-07 ENCOUNTER — MEDICAL CORRESPONDENCE (OUTPATIENT)
Dept: HEALTH INFORMATION MANAGEMENT | Facility: CLINIC | Age: 76
End: 2021-09-07

## 2021-09-23 DIAGNOSIS — E11.22 TYPE 2 DIABETES MELLITUS WITH STAGE 3 CHRONIC KIDNEY DISEASE, WITHOUT LONG-TERM CURRENT USE OF INSULIN, UNSPECIFIED WHETHER STAGE 3A OR 3B CKD (H): ICD-10-CM

## 2021-09-23 DIAGNOSIS — N18.30 TYPE 2 DIABETES MELLITUS WITH STAGE 3 CHRONIC KIDNEY DISEASE, WITHOUT LONG-TERM CURRENT USE OF INSULIN, UNSPECIFIED WHETHER STAGE 3A OR 3B CKD (H): ICD-10-CM

## 2021-09-23 DIAGNOSIS — I50.9 CONGESTIVE HEART FAILURE, UNSPECIFIED HF CHRONICITY, UNSPECIFIED HEART FAILURE TYPE (H): ICD-10-CM

## 2021-09-23 RX ORDER — GLIPIZIDE 5 MG/1
TABLET ORAL
Qty: 30 TABLET | Refills: 1 | Status: SHIPPED | OUTPATIENT
Start: 2021-09-23 | End: 2021-11-25

## 2021-09-23 RX ORDER — APIXABAN 5 MG/1
TABLET, FILM COATED ORAL
Qty: 60 TABLET | Refills: 1 | Status: SHIPPED | OUTPATIENT
Start: 2021-09-23 | End: 2021-11-25

## 2021-09-23 NOTE — TELEPHONE ENCOUNTER
Requested Prescriptions   Pending Prescriptions Disp Refills     ELIQUIS ANTICOAGULANT 5 MG tablet [Pharmacy Med Name: ELIQUIS 5MG TABLETS] 60 tablet 1     Sig: TAKE 1 TABLET(5 MG) BY MOUTH TWICE DAILY       Direct Oral Anticoagulant Agents Failed - 9/23/2021 11:55 AM        Failed - Serum creatinine less than or equal to 1.4 on file in past 12 mos     Recent Labs   Lab Test 07/05/21  1713 04/29/15  1520 04/22/15  0000   CR 3.73*   < >  --    CRPOC  --   --  1.1    < > = values in this interval not displayed.       Ok to refill medication if creatinine is low          Passed - Normal Platelets on file in past 12 months     Recent Labs   Lab Test 07/05/21  1713                  Passed - Medication is active on med list        Passed - Patient is 18-79 years of age        Passed - Weight is greater than 60 kg for the past year     Wt Readings from Last 3 Encounters:   07/06/21 131.5 kg (290 lb)   02/19/21 135.6 kg (299 lb)   02/12/20 144.7 kg (319 lb)             Passed - No active pregnancy on record        Passed - No positive pregnancy test within past 12 months        Passed - Recent (6 mo) or future (30 days) visit within the authorizing provider's specialty           glipiZIDE (GLUCOTROL) 5 MG tablet [Pharmacy Med Name: GLIPIZIDE 5MG TABLETS] 30 tablet 1     Sig: TAKE 1 TABLET(5 MG) BY MOUTH DAILY BEFORE A MEAL       Sulfonylurea Agents Failed - 9/23/2021 11:55 AM        Failed - Patient has a recent creatinine (normal) within the past 12 mos.     Recent Labs   Lab Test 07/05/21  1713 04/29/15  1520 04/22/15  0000   CR 3.73*   < >  --    CRPOC  --   --  1.1    < > = values in this interval not displayed.       Ok to refill medication if creatinine is low          Passed - Patient has documented A1c within the specified period of time.     If HgbA1C is 8 or greater, it needs to be on file within the past 3 months.  If less than 8, must be on file within the past 6 months.     Recent Labs   Lab Test  "07/05/21  1713   A1C 6.3*             Passed - Medication is active on med list        Passed - Patient is age 18 or older        Passed - No active pregnancy on record        Passed - Patient has not had a positive pregnancy test within the past 12 mos.        Passed - Recent (6 mo) or future (30 days) visit within the authorizing provider's specialty     Patient had office visit in the last 6 months or has a visit in the next 30 days with authorizing provider or within the authorizing provider's specialty.  See \"Patient Info\" tab in inbasket, or \"Choose Columns\" in Meds & Orders section of the refill encounter.               Shelby PRINCEN, RN    "

## 2021-09-28 DIAGNOSIS — I50.30 HEART FAILURE WITH PRESERVED EJECTION FRACTION, UNSPECIFIED HF CHRONICITY (H): ICD-10-CM

## 2021-09-28 RX ORDER — BUMETANIDE 2 MG/1
TABLET ORAL
Qty: 60 TABLET | Refills: 1 | Status: SHIPPED | OUTPATIENT
Start: 2021-09-28 | End: 2022-01-03

## 2021-10-10 ENCOUNTER — MEDICAL CORRESPONDENCE (OUTPATIENT)
Dept: HEALTH INFORMATION MANAGEMENT | Facility: CLINIC | Age: 76
End: 2021-10-10

## 2021-10-10 DIAGNOSIS — L21.9 SEBORRHEIC DERMATITIS: ICD-10-CM

## 2021-10-10 DIAGNOSIS — J45.40 MODERATE PERSISTENT ASTHMA WITHOUT COMPLICATION: ICD-10-CM

## 2021-10-11 RX ORDER — KETOCONAZOLE 20 MG/G
CREAM TOPICAL
Qty: 30 G | Refills: 1 | Status: SHIPPED | OUTPATIENT
Start: 2021-10-11

## 2021-10-11 RX ORDER — ALBUTEROL SULFATE 90 UG/1
AEROSOL, METERED RESPIRATORY (INHALATION)
Qty: 25.5 G | Refills: 1 | Status: SHIPPED | OUTPATIENT
Start: 2021-10-11 | End: 2023-01-01

## 2021-10-27 DIAGNOSIS — F33.1 MAJOR DEPRESSIVE DISORDER, RECURRENT EPISODE, MODERATE (H): ICD-10-CM

## 2021-10-27 DIAGNOSIS — J45.40 MODERATE PERSISTENT ASTHMA WITHOUT COMPLICATION: ICD-10-CM

## 2021-10-27 DIAGNOSIS — G25.81 RLS (RESTLESS LEGS SYNDROME): ICD-10-CM

## 2021-10-27 DIAGNOSIS — E55.9 VITAMIN D DEFICIENCY: ICD-10-CM

## 2021-10-27 RX ORDER — MONTELUKAST SODIUM 10 MG/1
TABLET ORAL
Qty: 90 TABLET | Refills: 1 | Status: SHIPPED | OUTPATIENT
Start: 2021-10-27 | End: 2022-01-01

## 2021-10-27 RX ORDER — BUPROPION HYDROCHLORIDE 200 MG/1
TABLET, EXTENDED RELEASE ORAL
Qty: 90 TABLET | Refills: 1 | Status: SHIPPED | OUTPATIENT
Start: 2021-10-27 | End: 2022-01-01

## 2021-10-27 RX ORDER — CHOLECALCIFEROL (VITAMIN D3) 50 MCG
TABLET ORAL
Qty: 90 TABLET | Refills: 1 | Status: SHIPPED | OUTPATIENT
Start: 2021-10-27 | End: 2022-01-01

## 2021-10-27 RX ORDER — ROPINIROLE 0.5 MG/1
TABLET, FILM COATED ORAL
Qty: 90 TABLET | Refills: 0 | Status: SHIPPED | OUTPATIENT
Start: 2021-10-27 | End: 2022-01-28

## 2021-11-02 DIAGNOSIS — K21.9 GASTROESOPHAGEAL REFLUX DISEASE WITHOUT ESOPHAGITIS: ICD-10-CM

## 2021-11-02 RX ORDER — PANTOPRAZOLE SODIUM 40 MG/1
40 TABLET, DELAYED RELEASE ORAL DAILY
Qty: 90 TABLET | Refills: 1 | Status: SHIPPED | OUTPATIENT
Start: 2021-11-02 | End: 2022-01-01

## 2021-11-24 ENCOUNTER — TELEPHONE (OUTPATIENT)
Dept: FAMILY MEDICINE | Facility: CLINIC | Age: 76
End: 2021-11-24
Payer: COMMERCIAL

## 2021-11-24 DIAGNOSIS — I10 ESSENTIAL HYPERTENSION WITH GOAL BLOOD PRESSURE LESS THAN 140/90: ICD-10-CM

## 2021-11-24 DIAGNOSIS — N18.30 TYPE 2 DIABETES MELLITUS WITH STAGE 3 CHRONIC KIDNEY DISEASE, WITHOUT LONG-TERM CURRENT USE OF INSULIN, UNSPECIFIED WHETHER STAGE 3A OR 3B CKD (H): ICD-10-CM

## 2021-11-24 DIAGNOSIS — E11.22 TYPE 2 DIABETES MELLITUS WITH STAGE 3 CHRONIC KIDNEY DISEASE, WITHOUT LONG-TERM CURRENT USE OF INSULIN, UNSPECIFIED WHETHER STAGE 3A OR 3B CKD (H): ICD-10-CM

## 2021-11-24 DIAGNOSIS — I50.9 CONGESTIVE HEART FAILURE, UNSPECIFIED HF CHRONICITY, UNSPECIFIED HEART FAILURE TYPE (H): ICD-10-CM

## 2021-11-24 NOTE — TELEPHONE ENCOUNTER
Patient having increased arthritis pain with the increase cold weather.   Arthritis is in all joints  Wondering what else she can take besides Tylenol      Was recently taken off Gabapenitn and experiencing numbness in lower legs and feet. States cannot recall who took her off but has been off for months  Wondering if can go back on the Gabapentin    To provider to advise      Mattie PRINCEN, RN

## 2021-11-24 NOTE — TELEPHONE ENCOUNTER
"Requested Prescriptions   Pending Prescriptions Disp Refills     metoprolol succinate ER (TOPROL-XL) 50 MG 24 hr tablet [Pharmacy Med Name: METOPROLOL ER SUCCINATE 50MG TABS] 90 tablet 0     Sig: TAKE 1 TABLET BY MOUTH EVERY DAY       Beta-Blockers Protocol Passed - 11/24/2021  9:17 AM        Passed - Blood pressure under 140/90 in past 12 months     BP Readings from Last 3 Encounters:   07/06/21 104/47   02/19/21 137/79   02/12/20 136/72                 Passed - Patient is age 6 or older        Passed - Recent (12 mo) or future (30 days) visit within the authorizing provider's specialty     Patient has had an office visit with the authorizing provider or a provider within the authorizing providers department within the previous 12 mos or has a future within next 30 days. See \"Patient Info\" tab in inbasket, or \"Choose Columns\" in Meds & Orders section of the refill encounter.              Passed - Medication is active on med list           ELIQUIS ANTICOAGULANT 5 MG tablet [Pharmacy Med Name: ELIQUIS 5MG TABLETS] 60 tablet 1     Sig: TAKE 1 TABLET(5 MG) BY MOUTH TWICE DAILY       Direct Oral Anticoagulant Agents Failed - 11/24/2021  9:17 AM        Failed - Serum creatinine less than or equal to 1.4 on file in past 12 mos     Recent Labs   Lab Test 07/05/21  1713 04/29/15  1520 04/22/15  0000   CR 3.73*   < >  --    CRPOC  --   --  1.1    < > = values in this interval not displayed.       Ok to refill medication if creatinine is low          Passed - Normal Platelets on file in past 12 months     Recent Labs   Lab Test 07/05/21  1713                  Passed - Medication is active on med list        Passed - Patient is 18-79 years of age        Passed - Weight is greater than 60 kg for the past year     Wt Readings from Last 3 Encounters:   07/06/21 131.5 kg (290 lb)   02/19/21 135.6 kg (299 lb)   02/12/20 144.7 kg (319 lb)             Passed - No active pregnancy on record        Passed - No positive pregnancy " "test within past 12 months        Passed - Recent (6 mo) or future (30 days) visit within the authorizing provider's specialty           glipiZIDE (GLUCOTROL) 5 MG tablet [Pharmacy Med Name: GLIPIZIDE 5MG TABLETS] 30 tablet 1     Sig: TAKE 1 TABLET(5 MG) BY MOUTH DAILY BEFORE A MEAL       Sulfonylurea Agents Failed - 11/24/2021  9:17 AM        Failed - Patient has a recent creatinine (normal) within the past 12 mos.     Recent Labs   Lab Test 07/05/21  1713 04/29/15  1520 04/22/15  0000   CR 3.73*   < >  --    CRPOC  --   --  1.1    < > = values in this interval not displayed.       Ok to refill medication if creatinine is low          Passed - Patient has documented A1c within the specified period of time.     If HgbA1C is 8 or greater, it needs to be on file within the past 3 months.  If less than 8, must be on file within the past 6 months.     Recent Labs   Lab Test 07/05/21 1713   A1C 6.3*             Passed - Medication is active on med list        Passed - Patient is age 18 or older        Passed - No active pregnancy on record        Passed - Patient has not had a positive pregnancy test within the past 12 mos.        Passed - Recent (6 mo) or future (30 days) visit within the authorizing provider's specialty     Patient had office visit in the last 6 months or has a visit in the next 30 days with authorizing provider or within the authorizing provider's specialty.  See \"Patient Info\" tab in inbasket, or \"Choose Columns\" in Meds & Orders section of the refill encounter.               Shelby JIMÉNEZ, RN    "

## 2021-11-24 NOTE — TELEPHONE ENCOUNTER
We can do a telephone visit to discuss pain next week. Tylenol is the only over the counter safe med to use. David Dotson MD

## 2021-11-24 NOTE — TELEPHONE ENCOUNTER
Patient informed of provider note as below.   Appointment for telephone visit scheduled 11/30 with Dr. David Moreno BSN, RN

## 2021-11-25 RX ORDER — APIXABAN 5 MG/1
TABLET, FILM COATED ORAL
Qty: 180 TABLET | Refills: 0 | Status: SHIPPED | OUTPATIENT
Start: 2021-11-25 | End: 2022-03-08

## 2021-11-25 RX ORDER — METOPROLOL SUCCINATE 50 MG/1
TABLET, EXTENDED RELEASE ORAL
Qty: 90 TABLET | Refills: 0 | Status: SHIPPED | OUTPATIENT
Start: 2021-11-25 | End: 2022-03-08

## 2021-11-25 RX ORDER — GLIPIZIDE 5 MG/1
TABLET ORAL
Qty: 90 TABLET | Refills: 0 | Status: SHIPPED | OUTPATIENT
Start: 2021-11-25 | End: 2022-02-01

## 2021-11-30 ENCOUNTER — VIRTUAL VISIT (OUTPATIENT)
Dept: FAMILY MEDICINE | Facility: CLINIC | Age: 76
End: 2021-11-30
Payer: COMMERCIAL

## 2021-11-30 DIAGNOSIS — M79.7 FIBROMYALGIA: ICD-10-CM

## 2021-11-30 DIAGNOSIS — E78.5 HYPERLIPIDEMIA WITH TARGET LDL LESS THAN 100: ICD-10-CM

## 2021-11-30 DIAGNOSIS — E13.49 OTHER DIABETIC NEUROLOGICAL COMPLICATION ASSOCIATED WITH OTHER SPECIFIED DIABETES MELLITUS (H): Primary | ICD-10-CM

## 2021-11-30 PROCEDURE — 99442 PR PHYSICIAN TELEPHONE EVALUATION 11-20 MIN: CPT | Mod: 95 | Performed by: FAMILY MEDICINE

## 2021-11-30 RX ORDER — GABAPENTIN 100 MG/1
CAPSULE ORAL
Qty: 60 CAPSULE | Refills: 3 | Status: SHIPPED | OUTPATIENT
Start: 2021-11-30

## 2021-11-30 NOTE — PROGRESS NOTES
Catherine is a 75 year old who is being evaluated via a billable telephone visit.    Both feet/leg - numbness x1 month after taken off gabapentin. Not sure why stopped gabapentin - was working well and no side effects. Kidney specialist ok with gabapentin.  Memory currently ok. Gabapentin didn't make too tired.   Follow-up htn, dm, CHF, copd, a.fib, high cholesterol, morbid obesity and depression/anxiety  What phone number would you like to be contacted at? [phone]  How would you like to obtain your AVS? 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 Gabapentin ok in past. Not sure why stopped  Plan: gabapentin (NEURONTIN) 100 MG capsule        Will restart at lower dosage because helpful in past. Reveiwed risks and side effects of medication. Follow-up md appointment in next 1-2 months. Expected course and warning signs reviewed. Stopped if side effects/ too sedating.     (E78.5) Hyperlipidemia with target LDL less than 100  Comment: on lipitor  Plan: follow-up labs/med/ blood pressure check in next 1-2 months.           Subjective   Catherine is a 75 year old who presents for the following health issues    HPI     Both feet/leg - numbness x1 month after taken off gabapentin     Review of Systems   All other ROS negative.       Objective           Vitals:  No vitals were obtained today due to virtual visit.    Physical Exam   healthy, alert and no distress  PSYCH: Alert and oriented times 3; coherent speech, normal   rate and volume, able to articulate logical thoughts, able   to abstract reason, no tangential thoughts, no hallucinations   or delusions  Her affect is normal  RESP: No cough, no audible wheezing, able to talk in full sentences  Remainder of exam unable to be completed due to telephone visits          Phone call duration: 11 minutes

## 2022-01-01 ENCOUNTER — TELEPHONE (OUTPATIENT)
Dept: PHARMACY | Facility: CLINIC | Age: 77
End: 2022-01-01

## 2022-01-01 ENCOUNTER — TELEPHONE (OUTPATIENT)
Dept: FAMILY MEDICINE | Facility: CLINIC | Age: 77
End: 2022-01-01

## 2022-01-01 ENCOUNTER — MEDICAL CORRESPONDENCE (OUTPATIENT)
Dept: HEALTH INFORMATION MANAGEMENT | Facility: CLINIC | Age: 77
End: 2022-01-01

## 2022-01-01 ENCOUNTER — NURSE TRIAGE (OUTPATIENT)
Dept: NURSING | Facility: CLINIC | Age: 77
End: 2022-01-01

## 2022-01-01 DIAGNOSIS — K21.9 GASTROESOPHAGEAL REFLUX DISEASE WITHOUT ESOPHAGITIS: ICD-10-CM

## 2022-01-01 DIAGNOSIS — N18.30 TYPE 2 DIABETES MELLITUS WITH STAGE 3 CHRONIC KIDNEY DISEASE, WITHOUT LONG-TERM CURRENT USE OF INSULIN, UNSPECIFIED WHETHER STAGE 3A OR 3B CKD (H): ICD-10-CM

## 2022-01-01 DIAGNOSIS — E11.22 TYPE 2 DIABETES MELLITUS WITH STAGE 3 CHRONIC KIDNEY DISEASE, WITHOUT LONG-TERM CURRENT USE OF INSULIN, UNSPECIFIED WHETHER STAGE 3A OR 3B CKD (H): ICD-10-CM

## 2022-01-01 DIAGNOSIS — E55.9 VITAMIN D DEFICIENCY: ICD-10-CM

## 2022-01-01 DIAGNOSIS — G25.81 RLS (RESTLESS LEGS SYNDROME): ICD-10-CM

## 2022-01-01 DIAGNOSIS — I50.9 CONGESTIVE HEART FAILURE, UNSPECIFIED HF CHRONICITY, UNSPECIFIED HEART FAILURE TYPE (H): ICD-10-CM

## 2022-01-01 DIAGNOSIS — I50.30 HEART FAILURE WITH PRESERVED EJECTION FRACTION, UNSPECIFIED HF CHRONICITY (H): ICD-10-CM

## 2022-01-01 DIAGNOSIS — J45.40 MODERATE PERSISTENT ASTHMA WITHOUT COMPLICATION: ICD-10-CM

## 2022-01-01 DIAGNOSIS — F33.1 MAJOR DEPRESSIVE DISORDER, RECURRENT EPISODE, MODERATE (H): ICD-10-CM

## 2022-01-01 DIAGNOSIS — I10 ESSENTIAL HYPERTENSION WITH GOAL BLOOD PRESSURE LESS THAN 140/90: ICD-10-CM

## 2022-01-01 RX ORDER — GLIPIZIDE 5 MG/1
TABLET ORAL
Qty: 30 TABLET | Refills: 1 | Status: SHIPPED | OUTPATIENT
Start: 2022-01-01 | End: 2022-01-01

## 2022-01-01 RX ORDER — METOPROLOL SUCCINATE 50 MG/1
50 TABLET, EXTENDED RELEASE ORAL DAILY
Qty: 90 TABLET | Refills: 1 | Status: SHIPPED | OUTPATIENT
Start: 2022-01-01

## 2022-01-01 RX ORDER — BUMETANIDE 2 MG/1
TABLET ORAL
Qty: 60 TABLET | Refills: 1 | Status: SHIPPED | OUTPATIENT
Start: 2022-01-01 | End: 2022-01-01

## 2022-01-01 RX ORDER — GLIPIZIDE 5 MG/1
TABLET ORAL
Qty: 30 TABLET | Refills: 1 | Status: SHIPPED | OUTPATIENT
Start: 2022-01-01

## 2022-01-01 RX ORDER — BUPROPION HYDROCHLORIDE 200 MG/1
TABLET, EXTENDED RELEASE ORAL
Qty: 90 TABLET | Refills: 1 | Status: SHIPPED | OUTPATIENT
Start: 2022-01-01

## 2022-01-01 RX ORDER — ROPINIROLE 0.5 MG/1
TABLET, FILM COATED ORAL
Qty: 90 TABLET | Refills: 1 | Status: SHIPPED | OUTPATIENT
Start: 2022-01-01

## 2022-01-01 RX ORDER — BUMETANIDE 2 MG/1
TABLET ORAL
Qty: 60 TABLET | Refills: 1 | Status: SHIPPED | OUTPATIENT
Start: 2022-01-01

## 2022-01-01 RX ORDER — PANTOPRAZOLE SODIUM 40 MG/1
TABLET, DELAYED RELEASE ORAL
Qty: 90 TABLET | Refills: 1 | Status: SHIPPED | OUTPATIENT
Start: 2022-01-01 | End: 2022-01-01

## 2022-01-01 RX ORDER — CHOLECALCIFEROL (VITAMIN D3) 50 MCG
TABLET ORAL
Qty: 90 TABLET | Refills: 1 | Status: SHIPPED | OUTPATIENT
Start: 2022-01-01

## 2022-01-01 RX ORDER — APIXABAN 5 MG/1
TABLET, FILM COATED ORAL
Qty: 180 TABLET | Refills: 0 | Status: SHIPPED | OUTPATIENT
Start: 2022-01-01

## 2022-01-01 RX ORDER — AMLODIPINE BESYLATE 5 MG/1
TABLET ORAL
Qty: 90 TABLET | Refills: 1 | Status: SHIPPED | OUTPATIENT
Start: 2022-01-01

## 2022-01-01 RX ORDER — ISOSORBIDE MONONITRATE 60 MG/1
60 TABLET, EXTENDED RELEASE ORAL DAILY
Qty: 90 TABLET | Refills: 1 | Status: SHIPPED | OUTPATIENT
Start: 2022-01-01

## 2022-01-01 RX ORDER — BUMETANIDE 2 MG/1
TABLET ORAL
Qty: 60 TABLET | Refills: 1 | OUTPATIENT
Start: 2022-01-01

## 2022-01-01 RX ORDER — MONTELUKAST SODIUM 10 MG/1
TABLET ORAL
Qty: 90 TABLET | Refills: 1 | Status: SHIPPED | OUTPATIENT
Start: 2022-01-01

## 2022-01-03 DIAGNOSIS — I50.30 HEART FAILURE WITH PRESERVED EJECTION FRACTION, UNSPECIFIED HF CHRONICITY (H): ICD-10-CM

## 2022-01-03 DIAGNOSIS — E78.5 HYPERLIPIDEMIA WITH TARGET LDL LESS THAN 100: ICD-10-CM

## 2022-01-03 RX ORDER — ATORVASTATIN CALCIUM 80 MG/1
TABLET, FILM COATED ORAL
Qty: 90 TABLET | Refills: 3 | Status: SHIPPED | OUTPATIENT
Start: 2022-01-03

## 2022-01-03 RX ORDER — BUMETANIDE 2 MG/1
TABLET ORAL
Qty: 60 TABLET | Refills: 1 | Status: SHIPPED | OUTPATIENT
Start: 2022-01-03 | End: 2022-04-18

## 2022-01-03 NOTE — TELEPHONE ENCOUNTER
"Requested Prescriptions   Pending Prescriptions Disp Refills    atorvastatin (LIPITOR) 80 MG tablet [Pharmacy Med Name: ATORVASTATIN 80MG TABLETS] 90 tablet 1     Sig: TAKE 1 TABLET BY MOUTH DAILY        Statins Protocol Failed - 1/3/2022  9:05 AM        Failed - LDL on file in past 12 months     Recent Labs   Lab Test 12/09/19  1238   *               Passed - No abnormal creatine kinase in past 12 months     No lab results found.             Passed - Recent (12 mo) or future (30 days) visit within the authorizing provider's specialty     Patient has had an office visit with the authorizing provider or a provider within the authorizing providers department within the previous 12 mos or has a future within next 30 days. See \"Patient Info\" tab in inbasket, or \"Choose Columns\" in Meds & Orders section of the refill encounter.              Passed - Medication is active on med list        Passed - Patient is age 18 or older        Passed - No active pregnancy on record        Passed - No positive pregnancy test in past 12 months           bumetanide (BUMEX) 2 MG tablet [Pharmacy Med Name: BUMETANIDE 2MG TABLETS] 60 tablet 1     Sig: TAKE 1 TABLET BY MOUTH TWICE DAILY        Diuretics (Including Combos) Protocol Failed - 1/3/2022  9:05 AM        Failed - Normal serum creatinine on file in past 12 months       Recent Labs   Lab Test 07/05/21  1713   CR 3.73*              Passed - Blood pressure under 140/90 in past 12 months       BP Readings from Last 3 Encounters:   07/06/21 104/47   02/19/21 137/79   02/12/20 136/72                 Passed - Recent (12 mo) or future (30 days) visit within the authorizing provider's specialty     Patient has had an office visit with the authorizing provider or a provider within the authorizing providers department within the previous 12 mos or has a future within next 30 days. See \"Patient Info\" tab in inbasket, or \"Choose Columns\" in Meds & Orders section of the refill encounter.  "             Passed - Medication is active on med list        Passed - Patient is age 18 or older        Passed - No active pregancy on record        Passed - Normal serum potassium on file in past 12 months       Recent Labs   Lab Test 07/05/21  1713   POTASSIUM 5.1                    Passed - Normal serum sodium on file in past 12 months       Recent Labs   Lab Test 07/05/21  1713                 Passed - No positive pregnancy test in past 12 months

## 2022-01-28 DIAGNOSIS — I10 ESSENTIAL HYPERTENSION WITH GOAL BLOOD PRESSURE LESS THAN 140/90: ICD-10-CM

## 2022-01-28 DIAGNOSIS — G25.81 RLS (RESTLESS LEGS SYNDROME): ICD-10-CM

## 2022-01-28 RX ORDER — ROPINIROLE 0.5 MG/1
TABLET, FILM COATED ORAL
Qty: 90 TABLET | Refills: 0 | Status: SHIPPED | OUTPATIENT
Start: 2022-01-28 | End: 2022-01-01

## 2022-01-28 RX ORDER — AMLODIPINE BESYLATE 5 MG/1
TABLET ORAL
Qty: 90 TABLET | Refills: 1 | Status: SHIPPED | OUTPATIENT
Start: 2022-01-28 | End: 2022-01-01

## 2022-01-28 NOTE — TELEPHONE ENCOUNTER
"Requested Prescriptions   Pending Prescriptions Disp Refills    amLODIPine (NORVASC) 5 MG tablet [Pharmacy Med Name: AMLODIPINE BESYLATE 5MG TABLETS] 90 tablet 1     Sig: TAKE 1 TABLET BY MOUTH DAILY FOR BLOOD PRESSURE        Calcium Channel Blockers Protocol  Failed - 1/28/2022  2:28 PM        Failed - Normal serum creatinine on file in past 12 months     Recent Labs   Lab Test 07/05/21  1713 04/29/15  1520 04/22/15  0000   CR 3.73*   < >  --    CRPOC  --   --  1.1    < > = values in this interval not displayed.       Ok to refill medication if creatinine is low          Passed - Blood pressure under 140/90 in past 12 months       BP Readings from Last 3 Encounters:   07/06/21 104/47   02/19/21 137/79   02/12/20 136/72                 Passed - Recent (12 mo) or future (30 days) visit within the authorizing provider's specialty     Patient has had an office visit with the authorizing provider or a provider within the authorizing providers department within the previous 12 mos or has a future within next 30 days. See \"Patient Info\" tab in inbasket, or \"Choose Columns\" in Meds & Orders section of the refill encounter.              Passed - Medication is active on med list        Passed - Patient is age 18 or older        Passed - No active pregnancy on record        Passed - No positive pregnancy test in past 12 months           rOPINIRole (REQUIP) 0.5 MG tablet [Pharmacy Med Name: ROPINIROLE 0.5MG TABLETS] 90 tablet 0     Sig: TAKE 1 TABLET(0.5 MG) BY MOUTH DAILY WITH DINNER        Antiparkinson's Agents Protocol Failed - 1/28/2022  2:28 PM        Failed - ALT on record in past 12 months         Recent Labs   Lab Test 12/09/19  1238   ALT 22               Passed - Blood pressure under 140/90 in past 12 months       BP Readings from Last 3 Encounters:   07/06/21 104/47   02/19/21 137/79   02/12/20 136/72                 Passed - CBC on record in past 12 months     Recent Labs   Lab Test 07/05/21  1713   WBC 9.4   RBC " "4.26   HGB 10.9*   HCT 37.2                      Passed - Serum Creatinine on file in past 12 months     Recent Labs   Lab Test 07/05/21  1713 04/29/15  1520 04/22/15  0000   CR 3.73*   < >  --    CRPOC  --   --  1.1    < > = values in this interval not displayed.       Ok to refill medication if creatinine is low          Passed - Medication is active on med list        Passed - Patient is age 18 or older        Passed - No active pregnancy on record        Passed - No positive pregnancy test in the past 12 months        Passed - Recent (6 mo) or future (30 days) visit within the authorizing provider's specialty     Patient had office visit in the last 6 months or has a visit in the next 30 days with authorizing provider or within the authorizing provider's specialty.  See \"Patient Info\" tab in inbasket, or \"Choose Columns\" in Meds & Orders section of the refill encounter.                  "

## 2022-02-01 DIAGNOSIS — N18.30 TYPE 2 DIABETES MELLITUS WITH STAGE 3 CHRONIC KIDNEY DISEASE, WITHOUT LONG-TERM CURRENT USE OF INSULIN, UNSPECIFIED WHETHER STAGE 3A OR 3B CKD (H): ICD-10-CM

## 2022-02-01 DIAGNOSIS — E11.22 TYPE 2 DIABETES MELLITUS WITH STAGE 3 CHRONIC KIDNEY DISEASE, WITHOUT LONG-TERM CURRENT USE OF INSULIN, UNSPECIFIED WHETHER STAGE 3A OR 3B CKD (H): ICD-10-CM

## 2022-02-01 RX ORDER — GLIPIZIDE 5 MG/1
TABLET ORAL
Qty: 30 TABLET | Refills: 0 | Status: SHIPPED | OUTPATIENT
Start: 2022-02-01 | End: 2022-04-18

## 2022-02-01 NOTE — LETTER
February 2, 2022    Catherine Ferreira  [address]    [place] [address]    Dear Catherine,       We recently received a refill request for glipiZIDE (GLUCOTROL) 5 MG tablet.  We have refilled this for a one time 30 day supply only because you are due for a:    [place] office visit and lab appointment      Please schedule this lab appointment 4-5 days prior to the office visit.     Please call at your earliest convenience so that there will not be a delay with your future refills.          Thank you,   Your [facility] Team/  [phone]

## 2022-02-01 NOTE — TELEPHONE ENCOUNTER
"Requested Prescriptions   Pending Prescriptions Disp Refills    glipiZIDE (GLUCOTROL) 5 MG tablet [Pharmacy Med Name: GLIPIZIDE 5MG TABLETS] 90 tablet 0     Sig: TAKE 1 TABLET(5 MG) BY MOUTH DAILY BEFORE A MEAL        Sulfonylurea Agents Failed - 2/1/2022 10:00 AM        Failed - Patient has documented A1c within the specified period of time.     If HgbA1C is 8 or greater, it needs to be on file within the past 3 months.  If less than 8, must be on file within the past 6 months.     Recent Labs   Lab Test 07/05/21  1713   A1C 6.3*             Failed - Patient has a recent creatinine (normal) within the past 12 mos.     Recent Labs   Lab Test 07/05/21  1713 04/29/15  1520 04/22/15  0000   CR 3.73*   < >  --    CRPOC  --   --  1.1    < > = values in this interval not displayed.       Ok to refill medication if creatinine is low          Passed - Medication is active on med list        Passed - Patient is age 18 or older        Passed - No active pregnancy on record        Passed - Patient has not had a positive pregnancy test within the past 12 mos.        Passed - Recent (6 mo) or future (30 days) visit within the authorizing provider's specialty     Patient had office visit in the last 6 months or has a visit in the next 30 days with authorizing provider or within the authorizing provider's specialty.  See \"Patient Info\" tab in inbasket, or \"Choose Columns\" in Meds & Orders section of the refill encounter.                  "

## 2022-03-08 DIAGNOSIS — I50.9 CONGESTIVE HEART FAILURE, UNSPECIFIED HF CHRONICITY, UNSPECIFIED HEART FAILURE TYPE (H): ICD-10-CM

## 2022-03-08 DIAGNOSIS — I10 ESSENTIAL HYPERTENSION WITH GOAL BLOOD PRESSURE LESS THAN 140/90: ICD-10-CM

## 2022-03-08 RX ORDER — ISOSORBIDE MONONITRATE 60 MG/1
TABLET, EXTENDED RELEASE ORAL
Qty: 90 TABLET | Refills: 0 | Status: SHIPPED | OUTPATIENT
Start: 2022-03-08 | End: 2022-01-01

## 2022-03-08 RX ORDER — METOPROLOL SUCCINATE 50 MG/1
TABLET, EXTENDED RELEASE ORAL
Qty: 90 TABLET | Refills: 0 | Status: SHIPPED | OUTPATIENT
Start: 2022-03-08 | End: 2022-01-01

## 2022-03-08 RX ORDER — APIXABAN 5 MG/1
TABLET, FILM COATED ORAL
Qty: 180 TABLET | Refills: 0 | Status: SHIPPED | OUTPATIENT
Start: 2022-03-08 | End: 2022-01-01

## 2022-03-09 DIAGNOSIS — F41.1 GAD (GENERALIZED ANXIETY DISORDER): ICD-10-CM

## 2022-03-09 RX ORDER — PAROXETINE 20 MG/1
TABLET, FILM COATED ORAL
Qty: 90 TABLET | Refills: 1 | Status: SHIPPED | OUTPATIENT
Start: 2022-03-09 | End: 2022-01-01

## 2022-03-22 ENCOUNTER — TELEPHONE (OUTPATIENT)
Dept: FAMILY MEDICINE | Facility: CLINIC | Age: 77
End: 2022-03-22
Payer: COMMERCIAL

## 2022-03-22 NOTE — TELEPHONE ENCOUNTER
Reason for Call:  Form, our goal is to have forms completed with 72 hours, however, some forms may require a visit or additional information.    Type of letter, form or note:  disability    Who is the form from?: Patient - [employer]    Where did the form come from: Patient or family brought in       What clinic location was the form placed at?: [place]    Where the form was placed: Given to physician    What number is listed as a contact on the form?: [place] [employer] - [phone]       Additional comments: placed in your basket to complete and route back to the TC.  TC's please mail the completed for to the patient's home address.    Call taken on 3/22/2022 at 4:38 PM by Saniya Dickinson

## 2022-03-24 NOTE — TELEPHONE ENCOUNTER
Does patient still drive. If so I'm not comfortable with putting doing driving ability.  David Dotson MD

## 2022-03-29 NOTE — TELEPHONE ENCOUNTER
Dr Dotson completed the form.Mailed Application for Parking Disability form to patient's home.Tamia Butt MA/RAYSHAWN

## 2022-04-18 DIAGNOSIS — I50.30 HEART FAILURE WITH PRESERVED EJECTION FRACTION, UNSPECIFIED HF CHRONICITY (H): ICD-10-CM

## 2022-04-18 DIAGNOSIS — E11.22 TYPE 2 DIABETES MELLITUS WITH STAGE 3 CHRONIC KIDNEY DISEASE, WITHOUT LONG-TERM CURRENT USE OF INSULIN, UNSPECIFIED WHETHER STAGE 3A OR 3B CKD (H): ICD-10-CM

## 2022-04-18 DIAGNOSIS — N18.30 TYPE 2 DIABETES MELLITUS WITH STAGE 3 CHRONIC KIDNEY DISEASE, WITHOUT LONG-TERM CURRENT USE OF INSULIN, UNSPECIFIED WHETHER STAGE 3A OR 3B CKD (H): ICD-10-CM

## 2022-04-18 RX ORDER — BUMETANIDE 2 MG/1
2 TABLET ORAL 2 TIMES DAILY
Qty: 60 TABLET | Refills: 1 | Status: SHIPPED | OUTPATIENT
Start: 2022-04-18 | End: 2022-01-01

## 2022-04-18 RX ORDER — GLIPIZIDE 5 MG/1
TABLET ORAL
Qty: 30 TABLET | Refills: 1 | Status: SHIPPED | OUTPATIENT
Start: 2022-04-18 | End: 2022-01-01

## 2022-04-18 NOTE — TELEPHONE ENCOUNTER
"Requested Prescriptions   Pending Prescriptions Disp Refills    glipiZIDE (GLUCOTROL) 5 MG tablet [Pharmacy Med Name: GLIPIZIDE 5MG TABLETS] 30 tablet 0     Sig: TAKE 1 TABLET(5 MG) BY MOUTH DAILY BEFORE A MEAL        Sulfonylurea Agents Failed - 4/18/2022 10:33 AM        Failed - Patient has documented A1c within the specified period of time.     If HgbA1C is 8 or greater, it needs to be on file within the past 3 months.  If less than 8, must be on file within the past 6 months.     Recent Labs   Lab Test 07/05/21  1713   A1C 6.3*             Failed - Patient has a recent creatinine (normal) within the past 12 mos.     Recent Labs   Lab Test 07/05/21  1713 04/29/15  1520 04/22/15  0000   CR 3.73*   < >  --    CRPOC  --   --  1.1    < > = values in this interval not displayed.       Ok to refill medication if creatinine is low          Passed - Medication is active on med list        Passed - Patient is age 18 or older        Passed - No active pregnancy on record        Passed - Patient has not had a positive pregnancy test within the past 12 mos.        Passed - Recent (6 mo) or future (30 days) visit within the authorizing provider's specialty     Patient had office visit in the last 6 months or has a visit in the next 30 days with authorizing provider or within the authorizing provider's specialty.  See \"Patient Info\" tab in inbasket, or \"Choose Columns\" in Meds & Orders section of the refill encounter.                  "

## 2022-04-18 NOTE — LETTER
April 18, 2022    Catherine Ferreira  [address]    [facility] [address]    Dear Catherine,       We recently received a refill request for glipiZIDE (GLUCOTROL) 5 MG tablet.  We have refilled this for a one time 30 day supply only because you are due for a:    Diabetes office visit      Please call at your earliest convenience so that there will not be a delay with your future refills.          Thank you,   Your [facility] Team/  [phone]

## 2022-04-18 NOTE — TELEPHONE ENCOUNTER
"Requested Prescriptions   Pending Prescriptions Disp Refills    bumetanide (BUMEX) 2 MG tablet 60 tablet 1     Sig: Take 1 tablet (2 mg) by mouth 2 times daily        Diuretics (Including Combos) Protocol Failed - 4/18/2022  3:26 PM        Failed - Normal serum creatinine on file in past 12 months       Recent Labs   Lab Test 07/05/21  1713   CR 3.73*              Passed - Blood pressure under 140/90 in past 12 months       BP Readings from Last 3 Encounters:   07/06/21 104/47   02/19/21 137/79   02/12/20 136/72                 Passed - Recent (12 mo) or future (30 days) visit within the authorizing provider's specialty     Patient has had an office visit with the authorizing provider or a provider within the authorizing providers department within the previous 12 mos or has a future within next 30 days. See \"Patient Info\" tab in inbasket, or \"Choose Columns\" in Meds & Orders section of the refill encounter.              Passed - Medication is active on med list        Passed - Patient is age 18 or older        Passed - No active pregancy on record        Passed - Normal serum potassium on file in past 12 months       Recent Labs   Lab Test 07/05/21  1713   POTASSIUM 5.1                    Passed - Normal serum sodium on file in past 12 months       Recent Labs   Lab Test 07/05/21  1713                 Passed - No positive pregnancy test in past 12 months              "

## 2022-05-17 NOTE — TELEPHONE ENCOUNTER
Routing refill request to provider for review/approval because:  ACT Total Scores 9/25/2018 6/13/2019 12/9/2019   ACT TOTAL SCORE - - -   ASTHMA ER VISITS - - -   ASTHMA HOSPITALIZATIONS - - -   ACT TOTAL SCORE (Goal Greater than or Equal to 20) 9 8 7   In the past 12 months, how many times did you visit the emergency room for your asthma without being admitted to the hospital? 0 2 1   In the past 12 months, how many times were you hospitalized overnight because of your asthma? 1 0 1     PHQ 12/9/2019 7/13/2020 2/19/2021   PHQ-9 Total Score 15 7 10   Q9: Thoughts of better off dead/self-harm past 2 weeks Not at all Not at all Not at all     Health Maintenance Due   Topic Date Due     DEXA  Never done     URINE DRUG SCREEN  Never done     ANNUAL REVIEW OF HM ORDERS  Never done     HEPATITIS C SCREENING  Never done     ZOSTER IMMUNIZATION (2 of 3) 01/11/2017     MEDICARE ANNUAL WELLNESS VISIT  03/28/2017     MICROALBUMIN  05/18/2017     EYE EXAM  02/15/2018     DIABETIC FOOT EXAM  09/25/2019     ASTHMA CONTROL TEST  06/09/2020     ASTHMA ACTION PLAN  06/18/2020     ALT  12/09/2020     LIPID  12/09/2020     COVID-19 Vaccine (3 - Booster for Moderna series) 07/22/2021     PHQ-9  08/19/2021     HF ACTION PLAN  09/25/2021     A1C  10/05/2021     BMP  01/05/2022     HEMOGLOBIN  07/05/2022       Daniela Dewitt, RN

## 2022-06-22 NOTE — TELEPHONE ENCOUNTER
"Requested Prescriptions   Pending Prescriptions Disp Refills    bumetanide (BUMEX) 2 MG tablet [Pharmacy Med Name: BUMETANIDE 2MG TABLETS] 60 tablet 1     Sig: TAKE 1 TABLET(2 MG) BY MOUTH TWICE DAILY        Diuretics (Including Combos) Protocol Failed - 6/22/2022 11:10 AM        Failed - Normal serum creatinine on file in past 12 months       Recent Labs   Lab Test 07/05/21  1713   CR 3.73*              Passed - Blood pressure under 140/90 in past 12 months       BP Readings from Last 3 Encounters:   07/06/21 104/47   02/19/21 137/79   02/12/20 136/72                 Passed - Recent (12 mo) or future (30 days) visit within the authorizing provider's specialty     Patient has had an office visit with the authorizing provider or a provider within the authorizing providers department within the previous 12 mos or has a future within next 30 days. See \"Patient Info\" tab in inbasket, or \"Choose Columns\" in Meds & Orders section of the refill encounter.              Passed - Medication is active on med list        Passed - Patient is age 18 or older        Passed - No active pregancy on record        Passed - Normal serum potassium on file in past 12 months       Recent Labs   Lab Test 07/05/21  1713   POTASSIUM 5.1                    Passed - Normal serum sodium on file in past 12 months       Recent Labs   Lab Test 07/05/21  1713                 Passed - No positive pregnancy test in past 12 months           glipiZIDE (GLUCOTROL) 5 MG tablet [Pharmacy Med Name: GLIPIZIDE 5MG TABLETS] 30 tablet 1     Sig: TAKE 1 TABLET(5 MG) BY MOUTH DAILY BEFORE A MEAL        Sulfonylurea Agents Failed - 6/22/2022 11:10 AM        Failed - Patient has documented A1c within the specified period of time.     If HgbA1C is 8 or greater, it needs to be on file within the past 3 months.  If less than 8, must be on file within the past 6 months.     Recent Labs   Lab Test 07/05/21  1713   A1C 6.3*             Failed - Patient has a " "recent creatinine (normal) within the past 12 mos.     Recent Labs   Lab Test 07/05/21  1713 04/29/15  1520 04/22/15  0000   CR 3.73*   < >  --    CRPOC  --   --  1.1    < > = values in this interval not displayed.       Ok to refill medication if creatinine is low          Failed - Recent (6 mo) or future (30 days) visit within the authorizing provider's specialty     Patient had office visit in the last 6 months or has a visit in the next 30 days with authorizing provider or within the authorizing provider's specialty.  See \"Patient Info\" tab in inbasket, or \"Choose Columns\" in Meds & Orders section of the refill encounter.            Passed - Medication is active on med list        Passed - Patient is age 18 or older        Passed - No active pregnancy on record        Passed - Patient has not had a positive pregnancy test within the past 12 mos.              "

## 2022-06-23 NOTE — TELEPHONE ENCOUNTER
Patient was referred by her [facility] insurance plan.  Called patient to schedule appointment. Left voicemail with [facility] Scheduling Line ([phone]) for patient to call and schedule.    Wilner Ellis, KristenD, [facility]  Medication Therapy Management Pharmacist  Pager: [phone]

## 2022-08-17 NOTE — TELEPHONE ENCOUNTER
Covid screening completed    Right leg swollen 2 weeks of it swelling. Swelling to knee. Toes look like piggies.    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  Pressure on ears, inside of face hurts.    Going to urgent care today for leg swelling and face pain.    Celia Cagle RN  [facility] Nurse Advisor    Reason for Disposition    SEVERE pain (e.g., excruciating, unable to walk) and not improved after 2 hours of pain medicine    Additional Information    Negative: Chest pain    Negative: Followed an ankle injury    Negative: Followed a bee sting and has localized swelling (e.g., small area of puffy or swollen skin)    Negative: Followed an insect bite and has localized swelling (e.g., small area of puffy or swollen skin)    Negative: Ankle pain is main symptom    Negative: Swelling of both ankles (i.e., pedal edema)    Negative: Swelling of calf or leg is main symptom    Negative: Difficulty breathing    Negative: Entire foot is cool or blue in comparison to other side    Negative: Ankle pain and fever    Negative: Ankle redness and fever    Negative: Patient sounds very sick or weak to the triager    Protocols used: ANKLE SWELLING-A-OH

## 2022-08-19 NOTE — TELEPHONE ENCOUNTER
Patient is calling to the clinic with symptoms of pacemaker site hurting that started today 8/19/2022.    Patient stated that she just touched the site today and it started to hurt. Patient stated that she has had the pacemaker for 5 years. Patient denies the site looking red but the stated that the site is swollen. Patient would rate her pain a 4/10 and described as a soreness.    Patient denies feeling a flutter in chest. Patient was able to do vital signs over the phone. BP was 118/53 and pulse was 85.    Patient stated that she has been feeling lethargic, short of breath and that her chest has been heavy. Patient stated that she has been lightheaded and dizzy.     Patient stated that the chest heaviness has been constant and getting worse. Patient also stated that her shortness of breath has been getting worse.    Patient also mentioned that she is having left sided facial swelling and tingling. Patient stated that the swelling is getting worse. The facial swelling started about a week ago.    Writer advised patient to be seen in the emergency room for further evaluation of shortness of breath, chest heaviness and pain at pacemaker site.     Patient verbalized understanding and will call 911 to have EMS bring patient in.     NO further questions or concerns at this time.     Seema Espinoza RN, BSN  [facility]

## 2022-09-04 NOTE — LETTER
September 6, 2022    Catherine Ferreira  [address]    [facility] [address]    Dear Catherine,       We recently received a refill request for amlodipine and glipizide.  We have refilled this for a one time 30 day supply only because you are due for a:    Medication check office visit      Please call at your earliest convenience so that there will not be a delay with your future refills.          Thank you,   Your [facility] Team/  [phone]

## 2022-11-02 NOTE — LETTER
November 2, 2022    Catherine Ferreira  [address]    [facility] [address]    Dear Catherine,       We recently received a refill request for ELIQUIS ANTICOAGULANT 5 MG tablet.  We have refilled this for a one time 90 day supply only because you are due for a:    Wellness exam office visit and fasting lab appointment-needed in the next 2-3 months per Dr Dotson.      Please schedule an office visit with your provider and a lab appointment 4-5 days prior to the office visit.     Please call at your earliest convenience so that there will not be a delay with your future refills.          Thank you,   Your [facility] Team/  [phone]

## 2022-12-08 NOTE — TELEPHONE ENCOUNTER
Fax message: We never received the script from our request that you denied and said new script to follow. Thank you.    Requesting Bumetanide 2MG BID.

## 2023-01-01 DIAGNOSIS — J45.40 MODERATE PERSISTENT ASTHMA WITHOUT COMPLICATION: ICD-10-CM

## 2023-01-01 RX ORDER — ALBUTEROL SULFATE 90 UG/1
AEROSOL, METERED RESPIRATORY (INHALATION)
Qty: 25.5 G | Refills: 4 | Status: SHIPPED | OUTPATIENT
Start: 2023-01-01

## 2023-01-18 NOTE — PROGRESS NOTES
Clinic Care Coordination Contact  [facility]    Referral Source: Non-[place] Services  Clinical Data: Care Coordinator Outreach  Outreach attempted x 2.  VM box is full, unable to leave a message for patient.   Plan: Care Coordinator will send care coordination introduction letter with care coordinator contact information and explanation of care coordination services via mail. Care Coordinator will do no further outreaches at this time.    TINO Garcia RN Clinic Care Coordinator   [place], [place], [place]  Phone: [phone]      Yes

## 2025-07-04 NOTE — TELEPHONE ENCOUNTER
Patient is scheduled with Dr Dotson on 8/17/20. This is Dr Dotson's virtual day, she is on the schedule as in person visit. I left a message on her voicemail to see if she was planning on coming to the office, or she was planning on a telephone or video visit.Tamia Butt MA/RAYSHAWN     regular rate and rhythm details…